# Patient Record
Sex: FEMALE | Race: WHITE | NOT HISPANIC OR LATINO
[De-identification: names, ages, dates, MRNs, and addresses within clinical notes are randomized per-mention and may not be internally consistent; named-entity substitution may affect disease eponyms.]

---

## 2017-01-17 ENCOUNTER — RX RENEWAL (OUTPATIENT)
Age: 67
End: 2017-01-17

## 2017-02-02 ENCOUNTER — APPOINTMENT (OUTPATIENT)
Dept: CARDIOLOGY | Facility: CLINIC | Age: 67
End: 2017-02-02

## 2017-02-02 VITALS — SYSTOLIC BLOOD PRESSURE: 120 MMHG | BODY MASS INDEX: 29.23 KG/M2 | DIASTOLIC BLOOD PRESSURE: 70 MMHG | WEIGHT: 165 LBS

## 2017-02-09 ENCOUNTER — APPOINTMENT (OUTPATIENT)
Dept: INTERNAL MEDICINE | Facility: CLINIC | Age: 67
End: 2017-02-09

## 2017-02-17 ENCOUNTER — APPOINTMENT (OUTPATIENT)
Dept: INTERNAL MEDICINE | Facility: CLINIC | Age: 67
End: 2017-02-17

## 2017-02-17 VITALS — WEIGHT: 164 LBS | BODY MASS INDEX: 29.06 KG/M2 | HEIGHT: 63 IN

## 2017-02-17 VITALS — SYSTOLIC BLOOD PRESSURE: 115 MMHG | HEART RATE: 59 BPM | DIASTOLIC BLOOD PRESSURE: 74 MMHG

## 2017-03-20 ENCOUNTER — APPOINTMENT (OUTPATIENT)
Dept: CARDIOLOGY | Facility: CLINIC | Age: 67
End: 2017-03-20

## 2017-03-20 VITALS
SYSTOLIC BLOOD PRESSURE: 119 MMHG | HEART RATE: 64 BPM | DIASTOLIC BLOOD PRESSURE: 82 MMHG | WEIGHT: 161 LBS | HEIGHT: 63 IN | BODY MASS INDEX: 28.53 KG/M2

## 2017-03-27 ENCOUNTER — RECORD ABSTRACTING (OUTPATIENT)
Age: 67
End: 2017-03-27

## 2017-03-27 DIAGNOSIS — I25.2 OLD MYOCARDIAL INFARCTION: ICD-10-CM

## 2017-03-27 DIAGNOSIS — Z12.4 ENCOUNTER FOR SCREENING FOR MALIGNANT NEOPLASM OF CERVIX: ICD-10-CM

## 2017-05-04 ENCOUNTER — APPOINTMENT (OUTPATIENT)
Dept: CARDIOLOGY | Facility: CLINIC | Age: 67
End: 2017-05-04

## 2017-06-19 ENCOUNTER — OUTPATIENT (OUTPATIENT)
Dept: OUTPATIENT SERVICES | Facility: HOSPITAL | Age: 67
LOS: 1 days | Discharge: HOME | End: 2017-06-19

## 2017-06-19 ENCOUNTER — APPOINTMENT (OUTPATIENT)
Dept: CARDIOLOGY | Facility: CLINIC | Age: 67
End: 2017-06-19

## 2017-06-19 VITALS
DIASTOLIC BLOOD PRESSURE: 82 MMHG | HEART RATE: 56 BPM | BODY MASS INDEX: 28.7 KG/M2 | SYSTOLIC BLOOD PRESSURE: 152 MMHG | WEIGHT: 162 LBS

## 2017-06-19 DIAGNOSIS — R07.9 CHEST PAIN, UNSPECIFIED: ICD-10-CM

## 2017-06-19 DIAGNOSIS — E78.5 HYPERLIPIDEMIA, UNSPECIFIED: ICD-10-CM

## 2017-06-19 DIAGNOSIS — I25.10 ATHEROSCLEROTIC HEART DISEASE OF NATIVE CORONARY ARTERY WITHOUT ANGINA PECTORIS: ICD-10-CM

## 2017-06-20 ENCOUNTER — APPOINTMENT (OUTPATIENT)
Dept: INTERNAL MEDICINE | Facility: CLINIC | Age: 67
End: 2017-06-20

## 2017-06-20 ENCOUNTER — OUTPATIENT (OUTPATIENT)
Dept: OUTPATIENT SERVICES | Facility: HOSPITAL | Age: 67
LOS: 1 days | Discharge: HOME | End: 2017-06-20

## 2017-06-20 VITALS
WEIGHT: 160 LBS | HEART RATE: 60 BPM | HEIGHT: 63 IN | DIASTOLIC BLOOD PRESSURE: 82 MMHG | BODY MASS INDEX: 28.35 KG/M2 | SYSTOLIC BLOOD PRESSURE: 129 MMHG

## 2017-06-20 DIAGNOSIS — R07.9 CHEST PAIN, UNSPECIFIED: ICD-10-CM

## 2017-06-20 DIAGNOSIS — I25.10 ATHEROSCLEROTIC HEART DISEASE OF NATIVE CORONARY ARTERY WITHOUT ANGINA PECTORIS: ICD-10-CM

## 2017-06-20 DIAGNOSIS — E78.5 HYPERLIPIDEMIA, UNSPECIFIED: ICD-10-CM

## 2017-06-28 DIAGNOSIS — I50.22 CHRONIC SYSTOLIC (CONGESTIVE) HEART FAILURE: ICD-10-CM

## 2017-06-28 DIAGNOSIS — R73.9 HYPERGLYCEMIA, UNSPECIFIED: ICD-10-CM

## 2017-06-28 DIAGNOSIS — I10 ESSENTIAL (PRIMARY) HYPERTENSION: ICD-10-CM

## 2017-06-28 DIAGNOSIS — I25.810 ATHEROSCLEROSIS OF CORONARY ARTERY BYPASS GRAFT(S) WITHOUT ANGINA PECTORIS: ICD-10-CM

## 2017-07-07 LAB
ALBUMIN SERPL-MCNC: 4.2 G/DL
ALBUMIN/GLOB SERPL: 1.83
ALP SERPL-CCNC: 72 IU/L
ALT SERPL-CCNC: 24 IU/L
ANION GAP SERPL CALC-SCNC: 8 MEQ/L
AST SERPL-CCNC: 20 IU/L
BASOPHILS # BLD: 0.02 TH/MM3
BASOPHILS NFR BLD: 0.4 %
BILIRUB SERPL-MCNC: 0.5 MG/DL
BUN SERPL-MCNC: 7 MG/DL
BUN/CREAT SERPL: 10.4 %
CALCIUM SERPL-MCNC: 9.3 MG/DL
CHLORIDE SERPL-SCNC: 106 MEQ/L
CHOLEST SERPL-MCNC: 140 MG/DL
CO2 SERPL-SCNC: 25 MEQ/L
CREAT SERPL-MCNC: 0.67 MG/DL
DIFFERENTIAL METHOD BLD: NORMAL
EOSINOPHIL # BLD: 0.07 TH/MM3
EOSINOPHIL NFR BLD: 1.3 %
ERYTHROCYTE [DISTWIDTH] IN BLOOD BY AUTOMATED COUNT: 13.6 %
ESTIMATED AVERGAGE GLUCOSE (NORTH): 120 MG/DL
GFR SERPL CREATININE-BSD FRML MDRD: 88
GLUCOSE SERPL-MCNC: 103 MG/DL
GRANULOCYTES # BLD: 2.43 TH/MM3
GRANULOCYTES NFR BLD: 45.4 %
HBA1C MFR BLD: 5.8 %
HCT VFR BLD AUTO: 38.4 %
HDLC SERPL-MCNC: 63 MG/DL
HDLC SERPL: 2.22
HGB BLD-MCNC: 12.7 G/DL
IMM GRANULOCYTES # BLD: 0 TH/MM3
IMM GRANULOCYTES NFR BLD: 0 %
LDLC SERPL DIRECT ASSAY-MCNC: 60 MG/DL
LYMPHOCYTES # BLD: 2.2 TH/MM3
LYMPHOCYTES NFR BLD: 41.1 %
MCH RBC QN AUTO: 32 PG
MCHC RBC AUTO-ENTMCNC: 33.1 G/DL
MCV RBC AUTO: 96.7 FL
MONOCYTES # BLD: 0.63 TH/MM3
MONOCYTES NFR BLD: 11.8 %
PLATELET # BLD: 241 TH/MM3
PMV BLD AUTO: 11.3 FL
POTASSIUM SERPL-SCNC: 4.4 MMOL/L
PROT SERPL-MCNC: 6.5 G/DL
RBC # BLD AUTO: 3.97 MIL/MM3
SODIUM SERPL-SCNC: 139 MEQ/L
TRIGL SERPL-MCNC: 104 MG/DL
VLDLC SERPL-MCNC: 20 MG/DL
WBC # BLD: 5.35 TH/MM3

## 2017-07-18 ENCOUNTER — EMERGENCY (EMERGENCY)
Facility: HOSPITAL | Age: 67
LOS: 0 days | Discharge: HOME | End: 2017-07-18
Admitting: INTERNAL MEDICINE

## 2017-07-18 DIAGNOSIS — Z95.1 PRESENCE OF AORTOCORONARY BYPASS GRAFT: ICD-10-CM

## 2017-07-18 DIAGNOSIS — R42 DIZZINESS AND GIDDINESS: ICD-10-CM

## 2017-07-18 DIAGNOSIS — I25.10 ATHEROSCLEROTIC HEART DISEASE OF NATIVE CORONARY ARTERY WITHOUT ANGINA PECTORIS: ICD-10-CM

## 2017-07-18 DIAGNOSIS — Z79.899 OTHER LONG TERM (CURRENT) DRUG THERAPY: ICD-10-CM

## 2017-07-18 DIAGNOSIS — R07.9 CHEST PAIN, UNSPECIFIED: ICD-10-CM

## 2017-07-18 DIAGNOSIS — Z95.810 PRESENCE OF AUTOMATIC (IMPLANTABLE) CARDIAC DEFIBRILLATOR: ICD-10-CM

## 2017-07-18 DIAGNOSIS — E78.00 PURE HYPERCHOLESTEROLEMIA, UNSPECIFIED: ICD-10-CM

## 2017-07-18 DIAGNOSIS — Z79.82 LONG TERM (CURRENT) USE OF ASPIRIN: ICD-10-CM

## 2017-07-18 DIAGNOSIS — I10 ESSENTIAL (PRIMARY) HYPERTENSION: ICD-10-CM

## 2017-07-18 DIAGNOSIS — K21.9 GASTRO-ESOPHAGEAL REFLUX DISEASE WITHOUT ESOPHAGITIS: ICD-10-CM

## 2017-07-18 DIAGNOSIS — E78.5 HYPERLIPIDEMIA, UNSPECIFIED: ICD-10-CM

## 2017-07-18 DIAGNOSIS — R11.11 VOMITING WITHOUT NAUSEA: ICD-10-CM

## 2017-07-24 ENCOUNTER — APPOINTMENT (OUTPATIENT)
Dept: INTERNAL MEDICINE | Facility: CLINIC | Age: 67
End: 2017-07-24

## 2017-07-24 ENCOUNTER — OUTPATIENT (OUTPATIENT)
Dept: OUTPATIENT SERVICES | Facility: HOSPITAL | Age: 67
LOS: 1 days | Discharge: HOME | End: 2017-07-24

## 2017-07-24 VITALS
HEIGHT: 63 IN | BODY MASS INDEX: 28.17 KG/M2 | DIASTOLIC BLOOD PRESSURE: 80 MMHG | HEART RATE: 64 BPM | WEIGHT: 159 LBS | SYSTOLIC BLOOD PRESSURE: 131 MMHG

## 2017-07-24 DIAGNOSIS — I25.10 ATHEROSCLEROTIC HEART DISEASE OF NATIVE CORONARY ARTERY WITHOUT ANGINA PECTORIS: ICD-10-CM

## 2017-07-24 DIAGNOSIS — R07.9 CHEST PAIN, UNSPECIFIED: ICD-10-CM

## 2017-07-24 DIAGNOSIS — E78.5 HYPERLIPIDEMIA, UNSPECIFIED: ICD-10-CM

## 2017-08-03 ENCOUNTER — APPOINTMENT (OUTPATIENT)
Dept: CARDIOLOGY | Facility: CLINIC | Age: 67
End: 2017-08-03

## 2017-08-03 VITALS — BODY MASS INDEX: 28.17 KG/M2 | WEIGHT: 159 LBS

## 2017-08-03 VITALS — SYSTOLIC BLOOD PRESSURE: 128 MMHG | DIASTOLIC BLOOD PRESSURE: 72 MMHG

## 2017-09-18 ENCOUNTER — APPOINTMENT (OUTPATIENT)
Dept: INTERNAL MEDICINE | Facility: CLINIC | Age: 67
End: 2017-09-18

## 2017-10-23 ENCOUNTER — APPOINTMENT (OUTPATIENT)
Dept: CARDIOLOGY | Facility: CLINIC | Age: 67
End: 2017-10-23

## 2017-10-23 ENCOUNTER — OUTPATIENT (OUTPATIENT)
Dept: OUTPATIENT SERVICES | Facility: HOSPITAL | Age: 67
LOS: 1 days | Discharge: HOME | End: 2017-10-23

## 2017-10-23 VITALS
WEIGHT: 163 LBS | SYSTOLIC BLOOD PRESSURE: 124 MMHG | HEART RATE: 65 BPM | BODY MASS INDEX: 28.88 KG/M2 | DIASTOLIC BLOOD PRESSURE: 70 MMHG | HEIGHT: 63 IN

## 2017-10-23 DIAGNOSIS — I25.10 ATHEROSCLEROTIC HEART DISEASE OF NATIVE CORONARY ARTERY WITHOUT ANGINA PECTORIS: ICD-10-CM

## 2017-10-23 DIAGNOSIS — R07.9 CHEST PAIN, UNSPECIFIED: ICD-10-CM

## 2017-10-23 DIAGNOSIS — E78.5 HYPERLIPIDEMIA, UNSPECIFIED: ICD-10-CM

## 2017-11-01 ENCOUNTER — OUTPATIENT (OUTPATIENT)
Dept: OUTPATIENT SERVICES | Facility: HOSPITAL | Age: 67
LOS: 1 days | Discharge: HOME | End: 2017-11-01

## 2017-11-01 ENCOUNTER — APPOINTMENT (OUTPATIENT)
Dept: INTERNAL MEDICINE | Facility: CLINIC | Age: 67
End: 2017-11-01

## 2017-11-01 VITALS
WEIGHT: 162 LBS | HEART RATE: 59 BPM | DIASTOLIC BLOOD PRESSURE: 79 MMHG | HEIGHT: 63 IN | BODY MASS INDEX: 28.7 KG/M2 | SYSTOLIC BLOOD PRESSURE: 122 MMHG

## 2017-11-01 DIAGNOSIS — Z23 ENCOUNTER FOR IMMUNIZATION: ICD-10-CM

## 2017-11-01 DIAGNOSIS — E78.5 HYPERLIPIDEMIA, UNSPECIFIED: ICD-10-CM

## 2017-11-01 DIAGNOSIS — I25.810 ATHEROSCLEROSIS OF CORONARY ARTERY BYPASS GRAFT(S) WITHOUT ANGINA PECTORIS: ICD-10-CM

## 2017-11-01 DIAGNOSIS — R07.9 CHEST PAIN, UNSPECIFIED: ICD-10-CM

## 2017-11-01 DIAGNOSIS — I10 ESSENTIAL (PRIMARY) HYPERTENSION: ICD-10-CM

## 2017-11-01 DIAGNOSIS — R73.03 PREDIABETES: ICD-10-CM

## 2017-11-01 DIAGNOSIS — I50.22 CHRONIC SYSTOLIC (CONGESTIVE) HEART FAILURE: ICD-10-CM

## 2017-11-01 DIAGNOSIS — I25.10 ATHEROSCLEROTIC HEART DISEASE OF NATIVE CORONARY ARTERY WITHOUT ANGINA PECTORIS: ICD-10-CM

## 2017-11-22 ENCOUNTER — OUTPATIENT (OUTPATIENT)
Dept: OUTPATIENT SERVICES | Facility: HOSPITAL | Age: 67
LOS: 1 days | Discharge: HOME | End: 2017-11-22

## 2017-11-22 DIAGNOSIS — R07.9 CHEST PAIN, UNSPECIFIED: ICD-10-CM

## 2017-11-22 DIAGNOSIS — E78.5 HYPERLIPIDEMIA, UNSPECIFIED: ICD-10-CM

## 2017-11-22 DIAGNOSIS — Z12.31 ENCOUNTER FOR SCREENING MAMMOGRAM FOR MALIGNANT NEOPLASM OF BREAST: ICD-10-CM

## 2017-11-22 DIAGNOSIS — I25.10 ATHEROSCLEROTIC HEART DISEASE OF NATIVE CORONARY ARTERY WITHOUT ANGINA PECTORIS: ICD-10-CM

## 2018-01-24 ENCOUNTER — RESULT REVIEW (OUTPATIENT)
Age: 68
End: 2018-01-24

## 2018-01-29 LAB
ANION GAP SERPL CALC-SCNC: 9 MEQ/L
BASOPHILS # BLD: 0.02 TH/MM3
BASOPHILS NFR BLD: 0.4 %
BUN SERPL-MCNC: 9 MG/DL
BUN/CREAT SERPL: 13.8 %
CALCIUM SERPL-MCNC: 9.9 MG/DL
CHLORIDE SERPL-SCNC: 104 MEQ/L
CHOLEST SERPL-MCNC: 163 MG/DL
CO2 SERPL-SCNC: 28 MEQ/L
CREAT SERPL-MCNC: 0.65 MG/DL
DIFFERENTIAL METHOD BLD: NORMAL
EOSINOPHIL # BLD: 0.07 TH/MM3
EOSINOPHIL NFR BLD: 1.4 %
ERYTHROCYTE [DISTWIDTH] IN BLOOD BY AUTOMATED COUNT: 13.4 %
ESTIMATED AVERGAGE GLUCOSE (NORTH): 117 MG/DL
GFR SERPL CREATININE-BSD FRML MDRD: 91
GLUCOSE SERPL-MCNC: 93 MG/DL
GRANULOCYTES # BLD: 2.49 TH/MM3
GRANULOCYTES NFR BLD: 50.5 %
HBA1C MFR BLD: 5.7 %
HCT VFR BLD AUTO: 38.8 %
HDLC SERPL-MCNC: 74 MG/DL
HDLC SERPL: 2.2
HGB BLD-MCNC: 12.9 G/DL
IMM GRANULOCYTES # BLD: 0 TH/MM3
IMM GRANULOCYTES NFR BLD: 0 %
LDLC SERPL DIRECT ASSAY-MCNC: 65 MG/DL
LYMPHOCYTES # BLD: 1.82 TH/MM3
LYMPHOCYTES NFR BLD: 36.8 %
MCH RBC QN AUTO: 31.5 PG
MCHC RBC AUTO-ENTMCNC: 33.2 G/DL
MCV RBC AUTO: 94.9 FL
MONOCYTES # BLD: 0.54 TH/MM3
MONOCYTES NFR BLD: 10.9 %
PLATELET # BLD: 265 TH/MM3
PMV BLD AUTO: 10.8 FL
POTASSIUM SERPL-SCNC: 5.4 MMOL/L
RBC # BLD AUTO: 4.09 MIL/MM3
SODIUM SERPL-SCNC: 141 MEQ/L
TRIGL SERPL-MCNC: 92 MG/DL
VLDLC SERPL-MCNC: 18 MG/DL
WBC # BLD: 4.94 TH/MM3

## 2018-02-01 ENCOUNTER — APPOINTMENT (OUTPATIENT)
Dept: CARDIOLOGY | Facility: CLINIC | Age: 68
End: 2018-02-01

## 2018-02-01 VITALS — SYSTOLIC BLOOD PRESSURE: 130 MMHG | DIASTOLIC BLOOD PRESSURE: 88 MMHG

## 2018-02-12 ENCOUNTER — OUTPATIENT (OUTPATIENT)
Dept: OUTPATIENT SERVICES | Facility: HOSPITAL | Age: 68
LOS: 1 days | Discharge: HOME | End: 2018-02-12

## 2018-02-12 ENCOUNTER — APPOINTMENT (OUTPATIENT)
Dept: CARDIOLOGY | Facility: CLINIC | Age: 68
End: 2018-02-12

## 2018-02-12 VITALS
WEIGHT: 168 LBS | SYSTOLIC BLOOD PRESSURE: 133 MMHG | BODY MASS INDEX: 29.77 KG/M2 | HEART RATE: 62 BPM | HEIGHT: 63 IN | DIASTOLIC BLOOD PRESSURE: 82 MMHG

## 2018-04-16 ENCOUNTER — LABORATORY RESULT (OUTPATIENT)
Age: 68
End: 2018-04-16

## 2018-04-16 ENCOUNTER — OUTPATIENT (OUTPATIENT)
Dept: OUTPATIENT SERVICES | Facility: HOSPITAL | Age: 68
LOS: 1 days | Discharge: HOME | End: 2018-04-16

## 2018-04-16 DIAGNOSIS — E87.5 HYPERKALEMIA: ICD-10-CM

## 2018-05-30 ENCOUNTER — OUTPATIENT (OUTPATIENT)
Dept: OUTPATIENT SERVICES | Facility: HOSPITAL | Age: 68
LOS: 1 days | Discharge: HOME | End: 2018-05-30

## 2018-05-30 ENCOUNTER — APPOINTMENT (OUTPATIENT)
Dept: INTERNAL MEDICINE | Facility: CLINIC | Age: 68
End: 2018-05-30

## 2018-05-30 VITALS
HEART RATE: 69 BPM | WEIGHT: 166 LBS | DIASTOLIC BLOOD PRESSURE: 91 MMHG | SYSTOLIC BLOOD PRESSURE: 141 MMHG | HEIGHT: 63 IN | BODY MASS INDEX: 29.41 KG/M2

## 2018-05-30 DIAGNOSIS — K64.9 UNSPECIFIED HEMORRHOIDS: ICD-10-CM

## 2018-05-30 DIAGNOSIS — R42 DIZZINESS AND GIDDINESS: ICD-10-CM

## 2018-05-30 DIAGNOSIS — I25.810 ATHEROSCLEROSIS OF CORONARY ARTERY BYPASS GRAFT(S) WITHOUT ANGINA PECTORIS: ICD-10-CM

## 2018-05-30 DIAGNOSIS — I50.22 CHRONIC SYSTOLIC (CONGESTIVE) HEART FAILURE: ICD-10-CM

## 2018-05-30 DIAGNOSIS — Z23 ENCOUNTER FOR IMMUNIZATION: ICD-10-CM

## 2018-05-30 DIAGNOSIS — I10 ESSENTIAL (PRIMARY) HYPERTENSION: ICD-10-CM

## 2018-05-30 NOTE — HISTORY OF PRESENT ILLNESS
[de-identified] : 67 yof, PMH of CAD s/p CABG, systolic CHF with AICD, HTN, HLD, BPPV, GERD, pre-DM presents for follow up and medication refills.  Patient complains of lower extremity edema (all day) and burning (in PM)  x 3-4 months.  Patient does take amlodipine and does not take diuretics.  Other than that, the patient feels well and has no complaints of chest pain, palpitations, or shortness of breath.  She follows regularly with cardiology and EP and is complaint with all her medications.

## 2018-05-30 NOTE — ASSESSMENT
[FreeTextEntry1] : 67 yof, PMH of CAD s/p CABG, systolic CHF with AICD, HTN, HLD, BPPV, GERD, pre-DM presents for follow up and medication refills.  Patient complains of lower extremity edema (all day) and burning (in PM)  x 3-4 months.  Patient does take amlodipine and does not take diuretics.\par 1. B/L LE edema-discontinue amlodipine.  Change to chlorthalidone 25 mg.  Patient should take blood pressure readings at home and return to the office in one month with readings to discuss next steps in plan of care.\par 2. CAD s/p CABG, systolic CHF with AICD-follow up with cardiology and electrophysiology.  Patient is on optimal medical therapy.\par 3. HTN-amlodipine discontinued.  Changed to chlorthalidone, secondary to lower extremity edema.  Continue ACE inhibitor, beta blocker.\par 4. HLD-LDL of 65 on atorvastatin 80 mg.\par 5. GERD-continue famotidine\par 6. Pre-DM-last A1c 5.7.   Continue diet and exercise.\par 7. BPPV-continue meclizine\par 8. HCM-patient received prevnar vaccine in clinic today.  Colonoscopy UTD.  Patient due for mammogram in November 2018.  Patient undecided regarding shingles vaccine.

## 2018-05-30 NOTE — PHYSICAL EXAM
[No Acute Distress] : no acute distress [Well Nourished] : well nourished [Well Developed] : well developed [Well-Appearing] : well-appearing [Normal Sclera/Conjunctiva] : normal sclera/conjunctiva [Normal Outer Ear/Nose] : the outer ears and nose were normal in appearance [No JVD] : no jugular venous distention [Supple] : supple [No Respiratory Distress] : no respiratory distress  [Clear to Auscultation] : lungs were clear to auscultation bilaterally [No Accessory Muscle Use] : no accessory muscle use [Normal Rate] : normal rate  [Regular Rhythm] : with a regular rhythm [Normal S1, S2] : normal S1 and S2 [No Murmur] : no murmur heard [No Rash] : no rash [No Skin Lesions] : no skin lesions [Coordination Grossly Intact] : coordination grossly intact [No Focal Deficits] : no focal deficits [Speech Grossly Normal] : speech grossly normal [Memory Grossly Normal] : memory grossly normal [Normal Affect] : the affect was normal [Alert and Oriented x3] : oriented to person, place, and time [Normal Mood] : the mood was normal [Normal Insight/Judgement] : insight and judgment were intact [de-identified] : 1+ B/L LE pitting edema

## 2018-05-30 NOTE — REVIEW OF SYSTEMS
[Lower Ext Edema] : lower extremity edema [Negative] : Neurological [Chest Pain] : no chest pain [Palpitations] : no palpitations [Leg Claudication] : no leg claudication [Orthopnea] : no orthopnea [Paroysmal Nocturnal Dyspnea] : no paroysmal nocturnal dyspnea

## 2018-06-04 ENCOUNTER — OUTPATIENT (OUTPATIENT)
Dept: OUTPATIENT SERVICES | Facility: HOSPITAL | Age: 68
LOS: 1 days | Discharge: HOME | End: 2018-06-04

## 2018-06-04 ENCOUNTER — APPOINTMENT (OUTPATIENT)
Dept: CARDIOLOGY | Facility: CLINIC | Age: 68
End: 2018-06-04

## 2018-06-04 VITALS
WEIGHT: 166 LBS | SYSTOLIC BLOOD PRESSURE: 133 MMHG | DIASTOLIC BLOOD PRESSURE: 86 MMHG | HEART RATE: 73 BPM | BODY MASS INDEX: 29.41 KG/M2 | HEIGHT: 63 IN

## 2018-06-04 RX ORDER — MECLIZINE HYDROCHLORIDE 25 MG/1
25 TABLET ORAL
Qty: 270 | Refills: 1 | Status: DISCONTINUED | COMMUNITY
Start: 2017-07-24 | End: 2018-06-04

## 2018-06-05 DIAGNOSIS — I50.22 CHRONIC SYSTOLIC (CONGESTIVE) HEART FAILURE: ICD-10-CM

## 2018-06-05 DIAGNOSIS — I25.10 ATHEROSCLEROTIC HEART DISEASE OF NATIVE CORONARY ARTERY WITHOUT ANGINA PECTORIS: ICD-10-CM

## 2018-06-05 DIAGNOSIS — I25.5 ISCHEMIC CARDIOMYOPATHY: ICD-10-CM

## 2018-06-05 DIAGNOSIS — I10 ESSENTIAL (PRIMARY) HYPERTENSION: ICD-10-CM

## 2018-06-28 ENCOUNTER — APPOINTMENT (OUTPATIENT)
Dept: INTERNAL MEDICINE | Facility: CLINIC | Age: 68
End: 2018-06-28

## 2018-06-28 ENCOUNTER — OUTPATIENT (OUTPATIENT)
Dept: OUTPATIENT SERVICES | Facility: HOSPITAL | Age: 68
LOS: 1 days | Discharge: HOME | End: 2018-06-28

## 2018-06-28 VITALS
HEIGHT: 62 IN | HEART RATE: 66 BPM | BODY MASS INDEX: 31.47 KG/M2 | DIASTOLIC BLOOD PRESSURE: 81 MMHG | WEIGHT: 171 LBS | SYSTOLIC BLOOD PRESSURE: 125 MMHG

## 2018-06-28 DIAGNOSIS — R60.0 LOCALIZED EDEMA: ICD-10-CM

## 2018-06-28 DIAGNOSIS — I25.810 ATHEROSCLEROSIS OF CORONARY ARTERY BYPASS GRAFT(S) WITHOUT ANGINA PECTORIS: ICD-10-CM

## 2018-06-28 DIAGNOSIS — B00.1 HERPESVIRAL VESICULAR DERMATITIS: ICD-10-CM

## 2018-06-28 DIAGNOSIS — B00.9 HERPESVIRAL INFECTION, UNSPECIFIED: ICD-10-CM

## 2018-06-28 DIAGNOSIS — I10 ESSENTIAL (PRIMARY) HYPERTENSION: ICD-10-CM

## 2018-06-28 DIAGNOSIS — I50.22 CHRONIC SYSTOLIC (CONGESTIVE) HEART FAILURE: ICD-10-CM

## 2018-06-28 NOTE — PHYSICAL EXAM
[No Acute Distress] : no acute distress [Well Developed] : well developed [Normal Sclera/Conjunctiva] : normal sclera/conjunctiva [Normal Outer Ear/Nose] : the outer ears and nose were normal in appearance [Supple] : supple [No Respiratory Distress] : no respiratory distress  [Clear to Auscultation] : lungs were clear to auscultation bilaterally [Regular Rhythm] : with a regular rhythm [Soft] : abdomen soft [Non Tender] : non-tender [No CVA Tenderness] : no CVA  tenderness [No Joint Swelling] : no joint swelling [No Rash] : no rash [No Focal Deficits] : no focal deficits [Normal Affect] : the affect was normal [de-identified] : herpatic lesion over lateral lip [de-identified] : 1+ pitting edema to RLE

## 2018-06-28 NOTE — ASSESSMENT
[FreeTextEntry1] : 66 yo F with CAD s/p CABG, systolic CHF with AICD, HTN, HLD, BPPV, GERD and pre-DM presents for follow up. \par \par  - herpetic lip lesion\par start acyclovir cream\par \par  - CAD s/p CABG with HFrEF s/p AICD\par c/w ASA, plavix, metoprolol, IMDUR and lipitor\par fu with cardiology and EP as scheduled\par \par  - HTN, well controlled,ankle swelling was resolved after was stopped amlodipine.\par c/w chlorthalidion \par \par  - DLD and pre diabetes\par c/w lipitor \par pt counselled about weight loss and dietary adjustments \par \par  - HCM\par fu in 6 months

## 2018-06-28 NOTE — HISTORY OF PRESENT ILLNESS
[FreeTextEntry1] : regular fu [de-identified] : 68 yo F with CAD s/p CABG, systolic CHF with AICD, HTN, HLD, BPPV, GERD and pre-DM presents for follow up. pt reports improvement in her LE edema since last visit. she thinks chlorthalidone is working well. pt is checking her BP at home prior to taking meds and readings range between 150/90 and 110/70s. pt has herpatic lesion on her lip. she reports some tiredness. pt denies any chest pain, palpitations, or shortness of breath. She follows regularly with cardiology and EP and is complaint with all her medications. \par

## 2018-08-02 ENCOUNTER — APPOINTMENT (OUTPATIENT)
Dept: CARDIOLOGY | Facility: CLINIC | Age: 68
End: 2018-08-02

## 2018-08-02 VITALS — HEART RATE: 57 BPM | DIASTOLIC BLOOD PRESSURE: 68 MMHG | SYSTOLIC BLOOD PRESSURE: 102 MMHG | HEIGHT: 62 IN

## 2018-09-03 PROBLEM — R60.0 BILATERAL EDEMA OF LOWER EXTREMITY: Status: RESOLVED | Noted: 2018-05-30 | Resolved: 2018-09-03

## 2018-09-13 ENCOUNTER — RX RENEWAL (OUTPATIENT)
Age: 68
End: 2018-09-13

## 2018-10-15 ENCOUNTER — APPOINTMENT (OUTPATIENT)
Dept: CARDIOLOGY | Facility: CLINIC | Age: 68
End: 2018-10-15

## 2018-10-15 ENCOUNTER — OUTPATIENT (OUTPATIENT)
Dept: OUTPATIENT SERVICES | Facility: HOSPITAL | Age: 68
LOS: 1 days | Discharge: HOME | End: 2018-10-15

## 2018-10-15 ENCOUNTER — RX RENEWAL (OUTPATIENT)
Age: 68
End: 2018-10-15

## 2018-10-15 VITALS
BODY MASS INDEX: 31.65 KG/M2 | WEIGHT: 172 LBS | HEIGHT: 62 IN | SYSTOLIC BLOOD PRESSURE: 119 MMHG | DIASTOLIC BLOOD PRESSURE: 72 MMHG | HEART RATE: 64 BPM

## 2018-10-15 DIAGNOSIS — I25.10 ATHEROSCLEROTIC HEART DISEASE OF NATIVE CORONARY ARTERY WITHOUT ANGINA PECTORIS: ICD-10-CM

## 2018-10-15 DIAGNOSIS — I10 ESSENTIAL (PRIMARY) HYPERTENSION: ICD-10-CM

## 2018-10-15 DIAGNOSIS — I25.5 ISCHEMIC CARDIOMYOPATHY: ICD-10-CM

## 2018-10-21 LAB
ANION GAP SERPL CALC-SCNC: 14 MMOL/L
BUN SERPL-MCNC: 9 MG/DL
CALCIUM SERPL-MCNC: 9.2 MG/DL
CHLORIDE SERPL-SCNC: 104 MMOL/L
CO2 SERPL-SCNC: 26 MMOL/L
CREAT SERPL-MCNC: 0.6 MG/DL
GLUCOSE SERPL-MCNC: 108 MG/DL
POTASSIUM SERPL-SCNC: 4.2 MMOL/L
SODIUM SERPL-SCNC: 144 MMOL/L

## 2018-10-24 ENCOUNTER — APPOINTMENT (OUTPATIENT)
Dept: INTERNAL MEDICINE | Facility: CLINIC | Age: 68
End: 2018-10-24

## 2018-10-24 ENCOUNTER — OUTPATIENT (OUTPATIENT)
Dept: OUTPATIENT SERVICES | Facility: HOSPITAL | Age: 68
LOS: 1 days | Discharge: HOME | End: 2018-10-24

## 2018-10-24 VITALS
RESPIRATION RATE: 16 BRPM | DIASTOLIC BLOOD PRESSURE: 67 MMHG | WEIGHT: 172.38 LBS | TEMPERATURE: 96.8 F | HEIGHT: 62 IN | SYSTOLIC BLOOD PRESSURE: 137 MMHG | HEART RATE: 61 BPM | BODY MASS INDEX: 31.72 KG/M2

## 2018-10-24 DIAGNOSIS — I25.729 ATHEROSCLEROSIS OF AUTOLOGOUS ARTERY CORONARY ARTERY BYPASS GRAFT(S) WITH UNSPECIFIED ANGINA PECTORIS: ICD-10-CM

## 2018-10-24 DIAGNOSIS — I25.5 ISCHEMIC CARDIOMYOPATHY: ICD-10-CM

## 2018-10-24 DIAGNOSIS — I10 ESSENTIAL (PRIMARY) HYPERTENSION: ICD-10-CM

## 2018-10-24 DIAGNOSIS — R07.9 CHEST PAIN, UNSPECIFIED: ICD-10-CM

## 2018-10-24 DIAGNOSIS — Z86.39 PERSONAL HISTORY OF OTHER ENDOCRINE, NUTRITIONAL AND METABOLIC DISEASE: ICD-10-CM

## 2018-10-24 DIAGNOSIS — Z23 ENCOUNTER FOR IMMUNIZATION: ICD-10-CM

## 2018-10-24 RX ORDER — NITROGLYCERIN 400 UG/1
0.4 SPRAY ORAL
Qty: 2 | Refills: 3 | Status: DISCONTINUED | COMMUNITY
Start: 2018-02-01 | End: 2018-10-24

## 2018-10-24 RX ORDER — HYDROCORTISONE ACETATE PRAMOXINE HCL 1; 1 G/100G; G/100G
1-1 CREAM TOPICAL
Qty: 1 | Refills: 5 | Status: DISCONTINUED | COMMUNITY
Start: 2018-05-30 | End: 2018-10-24

## 2018-10-24 RX ORDER — ACYCLOVIR 50 MG/G
5 OINTMENT TOPICAL 3 TIMES DAILY
Qty: 1 | Refills: 0 | Status: DISCONTINUED | COMMUNITY
Start: 2018-06-28 | End: 2018-10-24

## 2018-10-24 NOTE — PHYSICAL EXAM
[No Acute Distress] : no acute distress [Normal Sclera/Conjunctiva] : normal sclera/conjunctiva [Normal Outer Ear/Nose] : the outer ears and nose were normal in appearance [No JVD] : no jugular venous distention [Supple] : supple [No Respiratory Distress] : no respiratory distress  [Clear to Auscultation] : lungs were clear to auscultation bilaterally [No Accessory Muscle Use] : no accessory muscle use [Normal Rate] : normal rate  [Regular Rhythm] : with a regular rhythm [Normal S1, S2] : normal S1 and S2 [No Carotid Bruits] : no carotid bruits [No Edema] : there was no peripheral edema [Soft] : abdomen soft [Non Tender] : non-tender [Non-distended] : non-distended [No HSM] : no HSM [Normal Bowel Sounds] : normal bowel sounds [No CVA Tenderness] : no CVA  tenderness [Grossly Normal Strength/Tone] : grossly normal strength/tone [Normal Gait] : normal gait [No Focal Deficits] : no focal deficits

## 2018-10-24 NOTE — PLAN
[FreeTextEntry1] : \par \par 67 yo F with CAD s/p CABG, systolic CHF with AICD, HTN, HLD, BPPV, GERD and pre-DM presents for follow up. pt complains of episodes of chest pain not radiating, not related to physical exertion , last episode was 2 days ago,sometimes occur at night. pt denies fever , cough , palpitations , LL edema or any other symptoms.\par \par # CAD s/p CABG with HFrEF s/p AICD: \par - EKG done today: no new acute ischemic changes , consistent with same EKG from from August 2018. \par - c/w ASA, Plavix, metoprolol, IMDUR and Lipitor\par - f/u with Cardiology\par \par # HTN:\par - well controlled,\par - c/w chlorthalidone \par \par # DLD and pre diabetes: \par - c/w Lipitor \par - pt counselled about weight loss and dietary adjustments \par \par # HCM\par - flu shot today\par - mammogram referral\par - f/u in 3 months

## 2018-10-24 NOTE — REVIEW OF SYSTEMS
[Chest Pain] : chest pain [Fever] : no fever [Chills] : no chills [Fatigue] : no fatigue [Night Sweats] : no night sweats [Vision Problems] : no vision problems [Earache] : no earache [Hearing Loss] : no hearing loss [Nasal Discharge] : no nasal discharge [Palpitations] : no palpitations [Leg Claudication] : no leg claudication [Lower Ext Edema] : no lower extremity edema [Orthopnea] : no orthopnea [Paroysmal Nocturnal Dyspnea] : no paroysmal nocturnal dyspnea [Shortness Of Breath] : no shortness of breath [Wheezing] : no wheezing [Cough] : no cough [Dyspnea on Exertion] : no dyspnea on exertion [Abdominal Pain] : no abdominal pain [Nausea] : no nausea [Constipation] : no constipation [Diarrhea] : diarrhea [Vomiting] : no vomiting [Heartburn] : no heartburn [Dysuria] : no dysuria [Hematuria] : no hematuria [Joint Pain] : no joint pain [Muscle Pain] : no muscle pain [Itching] : no itching [Skin Rash] : no skin rash [Headache] : no headache [Anxiety] : no anxiety [Depression] : no depression

## 2018-10-24 NOTE — HISTORY OF PRESENT ILLNESS
[Family Member] : family member [FreeTextEntry1] : 67 yo F is here for routine visit and to fill up forms. [de-identified] : 67 yo F with CAD s/p CABG, systolic CHF with AICD, HTN, HLD, BPPV, GERD and pre-DM presents for follow up. pt complains of episodes of chest pain not radiating, not related to physical exertion , last episode was 2 days ago, usually occur at night. pt denies fever , cough , palpitations , LL edema or any other symptoms\par

## 2018-11-27 ENCOUNTER — OUTPATIENT (OUTPATIENT)
Dept: OUTPATIENT SERVICES | Facility: HOSPITAL | Age: 68
LOS: 1 days | Discharge: HOME | End: 2018-11-27

## 2018-11-27 DIAGNOSIS — Z12.31 ENCOUNTER FOR SCREENING MAMMOGRAM FOR MALIGNANT NEOPLASM OF BREAST: ICD-10-CM

## 2018-12-21 ENCOUNTER — APPOINTMENT (OUTPATIENT)
Dept: INTERNAL MEDICINE | Facility: CLINIC | Age: 68
End: 2018-12-21

## 2018-12-21 ENCOUNTER — OUTPATIENT (OUTPATIENT)
Dept: OUTPATIENT SERVICES | Facility: HOSPITAL | Age: 68
LOS: 1 days | Discharge: HOME | End: 2018-12-21

## 2018-12-21 NOTE — PHYSICAL EXAM
[No Acute Distress] : no acute distress [Well Nourished] : well nourished [Well Developed] : well developed [Well-Appearing] : well-appearing [Normal Sclera/Conjunctiva] : normal sclera/conjunctiva [EOMI] : extraocular movements intact [Normal Outer Ear/Nose] : the outer ears and nose were normal in appearance [Normal Oropharynx] : the oropharynx was normal [No JVD] : no jugular venous distention [Supple] : supple [No Respiratory Distress] : no respiratory distress  [Clear to Auscultation] : lungs were clear to auscultation bilaterally [No Accessory Muscle Use] : no accessory muscle use [Normal Rate] : normal rate  [Regular Rhythm] : with a regular rhythm [Normal S1, S2] : normal S1 and S2 [Pedal Pulses Present] : the pedal pulses are present [No Edema] : there was no peripheral edema [Soft] : abdomen soft [Non Tender] : non-tender [Non-distended] : non-distended [No Masses] : no abdominal mass palpated [No Spinal Tenderness] : no spinal tenderness [No Joint Swelling] : no joint swelling [Grossly Normal Strength/Tone] : grossly normal strength/tone [No Rash] : no rash [Normal Gait] : normal gait [Deep Tendon Reflexes (DTR)] : deep tendon reflexes were 2+ and symmetric [Normal Affect] : the affect was normal

## 2018-12-21 NOTE — HISTORY OF PRESENT ILLNESS
[Spouse] : spouse [FreeTextEntry1] : follow up visit for cad and DM [de-identified] : 69 yo F with CAD s/p CABG, systolic CHF with AICD, HTN, HLD, BPPV, GERD and pre-DM presents for follow up. patient needs refills in meds, flu vaccien and colonoscopy is UTD. her AICD device was remotely interrogated in nov and was normal as per the notes. she is asymptomatic and denies any CP. follows with dr pedro and dr vasquez for chf/cad/aicd. \par

## 2018-12-21 NOTE — ASSESSMENT
[FreeTextEntry1] : 69 yo F with CAD s/p CABG 7 years back in [facility], systolic CHF with AICD, HTN, HLD, BPPV, GERD and pre-DM presents for follow up. patient needs refills in meds, flu vaccien and colonoscopy is UTD. her AICD device was remotely interrogated in nov and was normal as per the notes. she is asymptomatic and denies any CP. follows with dr pedro and dr vasquez for chf/cad/aicd. \par \par 1- CAD s/p CABG 7 years ago \par HFREF s/p AICD\par \par stable\par c/w meds- ASA, lipitor, toprol xl, ACE-, plavix, imdur, ranexa\par \par 2- HTN- controlled on meds\par c/w meds\par \par 3- DLD- c/w statin\par \par 4- GERD- c/w famotidine q24\par \par 5- HCM- UTD\par mammogram normal\par flu vaccine received in last visit\par colonoscopy within last 10 yrs\par no more pap smear

## 2018-12-24 DIAGNOSIS — I25.810 ATHEROSCLEROSIS OF CORONARY ARTERY BYPASS GRAFT(S) WITHOUT ANGINA PECTORIS: ICD-10-CM

## 2018-12-24 DIAGNOSIS — I10 ESSENTIAL (PRIMARY) HYPERTENSION: ICD-10-CM

## 2018-12-24 DIAGNOSIS — I25.5 ISCHEMIC CARDIOMYOPATHY: ICD-10-CM

## 2018-12-24 DIAGNOSIS — I50.22 CHRONIC SYSTOLIC (CONGESTIVE) HEART FAILURE: ICD-10-CM

## 2019-01-31 ENCOUNTER — MEDICATION RENEWAL (OUTPATIENT)
Age: 69
End: 2019-01-31

## 2019-03-18 ENCOUNTER — RX RENEWAL (OUTPATIENT)
Age: 69
End: 2019-03-18

## 2019-04-15 ENCOUNTER — OUTPATIENT (OUTPATIENT)
Dept: OUTPATIENT SERVICES | Facility: HOSPITAL | Age: 69
LOS: 1 days | Discharge: HOME | End: 2019-04-15

## 2019-04-15 ENCOUNTER — APPOINTMENT (OUTPATIENT)
Dept: CARDIOLOGY | Facility: CLINIC | Age: 69
End: 2019-04-15
Payer: MEDICARE

## 2019-04-15 VITALS
HEART RATE: 73 BPM | SYSTOLIC BLOOD PRESSURE: 115 MMHG | DIASTOLIC BLOOD PRESSURE: 70 MMHG | BODY MASS INDEX: 32.39 KG/M2 | HEIGHT: 62 IN | WEIGHT: 176 LBS

## 2019-04-15 DIAGNOSIS — I25.10 ATHEROSCLEROTIC HEART DISEASE OF NATIVE CORONARY ARTERY WITHOUT ANGINA PECTORIS: ICD-10-CM

## 2019-04-15 DIAGNOSIS — I25.5 ISCHEMIC CARDIOMYOPATHY: ICD-10-CM

## 2019-04-15 PROCEDURE — 99213 OFFICE O/P EST LOW 20 MIN: CPT

## 2019-04-15 NOTE — PHYSICAL EXAM
[Normal Appearance] : normal appearance [General Appearance - Well Developed] : well developed [General Appearance - Well Nourished] : well nourished [Normal Jugular Venous V Waves Present] : normal jugular venous V waves present [] : no respiratory distress [Respiration, Rhythm And Depth] : normal respiratory rhythm and effort [Exaggerated Use Of Accessory Muscles For Inspiration] : no accessory muscle use [Heart Sounds] : normal S1 and S2 [Heart Rate And Rhythm] : heart rate and rhythm were normal [Murmurs] : no murmurs present [Nail Clubbing] : no clubbing of the fingernails [Cyanosis, Localized] : no localized cyanosis [Oriented To Time, Place, And Person] : oriented to person, place, and time

## 2019-04-22 ENCOUNTER — RX RENEWAL (OUTPATIENT)
Age: 69
End: 2019-04-22

## 2019-04-25 ENCOUNTER — FORM ENCOUNTER (OUTPATIENT)
Age: 69
End: 2019-04-25

## 2019-04-26 ENCOUNTER — OUTPATIENT (OUTPATIENT)
Dept: OUTPATIENT SERVICES | Facility: HOSPITAL | Age: 69
LOS: 1 days | Discharge: HOME | End: 2019-04-26

## 2019-04-26 DIAGNOSIS — I25.10 ATHEROSCLEROTIC HEART DISEASE OF NATIVE CORONARY ARTERY WITHOUT ANGINA PECTORIS: ICD-10-CM

## 2019-05-04 NOTE — ASSESSMENT
[FreeTextEntry1] : 69 yo F with PMH of HTN, DLD, ischemic cardiomyopathy, HFrEF s/p AICD, CAD s/p CABG + PCI, hyperkalemia here at clinic for routine follow up.\par \par # HFrEF\par -ECHO ( 2016) 40-45% and MUGA- 50%\par -rpt ECHO\par -last EKG 10/18- Sinus with 1 degree AV block\par -Recheck ECHO \par \par # CAD s/p PCI\par - On ASA, plavix, BB,Statin and ACE\par - No GIB\par \par \par # Chronic Stable Angina\par - On ranexa and Nitro \par \par # Hyperkalemia\par -Will recheck labs, no new labs since 2019\par -Check CMP for LFTs and K with PMD\par \par - f/u 4 months \par

## 2019-05-04 NOTE — END OF VISIT
[FreeTextEntry3] : Seen / examined and above reviewed.\par \par CAD s/p CABG, ICM s/p AICD, Chronic Systolic CHF.\par ECHO (2016): EF 40-45%. PN filling. Mild - Mod MR.\par MUGA (2016): EF 50%.\par \par Feels well.\par Relatively active. No interval symptoms.  Gained 4-lbs.\par \par BP controlled.\par Clinically compensated / euvolemic.\par \par - Cont ASA / Plavix.\par - Cont Lipitor.\par - Cont Toprol, Lisinopril, Chlorthalidone.\par - Cont Imdur, Ranexa.\par - ECHO\par - Regular PMD follow-up / labs.\par - Follow-up 4-months.  [] : Resident

## 2019-05-04 NOTE — HISTORY OF PRESENT ILLNESS
[FreeTextEntry1] : 69 yo F with PMH of HTN, DLD, ischemic cardiomyopathy, HFrEF s/p AICD, CAD s/p CABG + PCI, hyperkalemia here at clinic for routine follow up.\par \par As per pt, she has SOB and CP upon ambulation, although she is at her baseline per pt.  Her sxs worsen with climbing stairs. She denied LE swelling, orthopnea, PND. She did obtain her medications. \par \par ECHO (2016)\par EF 40-45%\par Inferolateral and basal mid inferior wall appears akinetic\par \par MUGA scan (2016)\par EF- 50%

## 2019-06-14 ENCOUNTER — OUTPATIENT (OUTPATIENT)
Dept: OUTPATIENT SERVICES | Facility: HOSPITAL | Age: 69
LOS: 1 days | Discharge: HOME | End: 2019-06-14

## 2019-06-14 ENCOUNTER — APPOINTMENT (OUTPATIENT)
Dept: INTERNAL MEDICINE | Facility: CLINIC | Age: 69
End: 2019-06-14

## 2019-06-14 VITALS
TEMPERATURE: 96.5 F | SYSTOLIC BLOOD PRESSURE: 138 MMHG | BODY MASS INDEX: 32.39 KG/M2 | WEIGHT: 176 LBS | HEIGHT: 62 IN | DIASTOLIC BLOOD PRESSURE: 73 MMHG | HEART RATE: 57 BPM

## 2019-06-14 DIAGNOSIS — I10 ESSENTIAL (PRIMARY) HYPERTENSION: ICD-10-CM

## 2019-06-14 DIAGNOSIS — F32.9 MAJOR DEPRESSIVE DISORDER, SINGLE EPISODE, UNSPECIFIED: ICD-10-CM

## 2019-06-18 NOTE — HISTORY OF PRESENT ILLNESS
[FreeTextEntry1] : refills [de-identified] : 68 F seen for refills. Denies SOB, CP or palipitations

## 2019-06-18 NOTE — ASSESSMENT
[FreeTextEntry1] : 67 yo F with CAD s/p CABG 7 years back in [facility], systolic CHF with AICD, HTN, HLD, BPPV, GERD and pre-DM presents for follow up. patient needs refills in meds, flu vaccien and colonoscopy is UTD. her AICD device was remotely interrogated in nov and was normal as per the notes. she is asymptomatic and denies any CP. follows with dr pedro and dr vasquez for chf/cad/aicd. \par \par 1- CAD s/p CABG 7 years ago \par HFREF s/p AICD\par \par stable\par c/w meds- ASA, lipitor, toprol xl, ACE-, plavix, imdur, ranexa\par \par 2- HTN- controlled on meds\par c/w meds\par \par 3- DLD- c/w statin\par \par 4- GERD- c/w famotidine q24\par \par 5- HCM- UTD\par mammogram normal\par flu vaccine received in last visit\par colonoscopy within last 10 yrs\par no more pap smear.

## 2019-06-18 NOTE — PHYSICAL EXAM
[No Acute Distress] : no acute distress [Normal Sclera/Conjunctiva] : normal sclera/conjunctiva [No JVD] : no jugular venous distention [Normal Outer Ear/Nose] : the outer ears and nose were normal in appearance [No Respiratory Distress] : no respiratory distress  [Normal Rate] : normal rate  [Regular Rhythm] : with a regular rhythm [Soft] : abdomen soft [No Carotid Bruits] : no carotid bruits [No HSM] : no HSM [No CVA Tenderness] : no CVA  tenderness [No Joint Swelling] : no joint swelling [Normal Gait] : normal gait [No Rash] : no rash [Speech Grossly Normal] : speech grossly normal

## 2019-06-27 ENCOUNTER — APPOINTMENT (OUTPATIENT)
Dept: CARDIOLOGY | Facility: CLINIC | Age: 69
End: 2019-06-27
Payer: MEDICAID

## 2019-06-27 VITALS — SYSTOLIC BLOOD PRESSURE: 120 MMHG | DIASTOLIC BLOOD PRESSURE: 70 MMHG

## 2019-06-27 PROCEDURE — 99213 OFFICE O/P EST LOW 20 MIN: CPT

## 2019-06-27 PROCEDURE — 93282 PRGRMG EVAL IMPLANTABLE DFB: CPT

## 2019-06-27 NOTE — PHYSICAL EXAM
[General Appearance - Well Developed] : well developed [Normal Appearance] : normal appearance [Well Groomed] : well groomed [General Appearance - Well Nourished] : well nourished [No Deformities] : no deformities [General Appearance - In No Acute Distress] : no acute distress [Heart Rate And Rhythm] : heart rate and rhythm were normal [Murmurs] : no murmurs present [Heart Sounds] : normal S1 and S2 [Respiration, Rhythm And Depth] : normal respiratory rhythm and effort [Exaggerated Use Of Accessory Muscles For Inspiration] : no accessory muscle use [Auscultation Breath Sounds / Voice Sounds] : lungs were clear to auscultation bilaterally [Clean] : clean [Dry] : dry [Well-Healed] : well-healed [Abdomen Soft] : soft [Abdomen Tenderness] : non-tender [Abdomen Mass (___ Cm)] : no abdominal mass palpated [Nail Clubbing] : no clubbing of the fingernails [Petechial Hemorrhages (___cm)] : no petechial hemorrhages [Cyanosis, Localized] : no localized cyanosis [] : no ischemic changes

## 2019-06-27 NOTE — PROCEDURE
[No] : not [NSR] : normal sinus rhythm [ICD] : Implantable cardioverter-defibrillator [VVI] : VVI [Impedance: ___ Ohms] : current cell impedance is [unfilled] Ohms [Charge Time: ___ sec] : charge time was [unfilled] seconds [Longevity: ___ months] : The estimated remaining battery life is [unfilled] months [Threshold Testing Performed] : Threshold testing was performed [Normal] : The battery status is normal. [Lead Imp:  ___ohms] : lead impedance was [unfilled] ohms [Sensing Amplitude ___mv] : sensing amplitude was [unfilled] mv [___V @] : [unfilled] V [___ ms] : [unfilled] ms [Programmed for Longevity] : output reprogrammed for improved battery longevity [Counters Reset] : the counters were reset [Sense ___ %] : Sense [unfilled]% [de-identified] : [facility] [de-identified] : [id number] [de-identified] : [id number] [de-identified] : 09/08/2016 [de-identified] : 40 [de-identified] : No new episodes. CorVue is stable.

## 2019-07-05 ENCOUNTER — APPOINTMENT (OUTPATIENT)
Dept: INTERNAL MEDICINE | Facility: CLINIC | Age: 69
End: 2019-07-05

## 2019-08-12 ENCOUNTER — APPOINTMENT (OUTPATIENT)
Dept: CARDIOLOGY | Facility: CLINIC | Age: 69
End: 2019-08-12

## 2019-11-05 ENCOUNTER — EMERGENCY (EMERGENCY)
Facility: HOSPITAL | Age: 69
LOS: 0 days | Discharge: HOME | End: 2019-11-05
Attending: EMERGENCY MEDICINE | Admitting: EMERGENCY MEDICINE
Payer: MEDICAID

## 2019-11-05 VITALS
HEART RATE: 72 BPM | RESPIRATION RATE: 20 BRPM | TEMPERATURE: 98 F | OXYGEN SATURATION: 98 % | SYSTOLIC BLOOD PRESSURE: 129 MMHG | DIASTOLIC BLOOD PRESSURE: 68 MMHG

## 2019-11-05 VITALS
TEMPERATURE: 100 F | RESPIRATION RATE: 18 BRPM | HEART RATE: 65 BPM | DIASTOLIC BLOOD PRESSURE: 56 MMHG | OXYGEN SATURATION: 95 % | SYSTOLIC BLOOD PRESSURE: 118 MMHG

## 2019-11-05 DIAGNOSIS — R55 SYNCOPE AND COLLAPSE: ICD-10-CM

## 2019-11-05 DIAGNOSIS — J06.9 ACUTE UPPER RESPIRATORY INFECTION, UNSPECIFIED: ICD-10-CM

## 2019-11-05 DIAGNOSIS — R05 COUGH: ICD-10-CM

## 2019-11-05 LAB
ANION GAP SERPL CALC-SCNC: 15 MMOL/L — HIGH (ref 7–14)
APPEARANCE UR: CLEAR — SIGNIFICANT CHANGE UP
BASOPHILS # BLD AUTO: 0.03 K/UL — SIGNIFICANT CHANGE UP (ref 0–0.2)
BASOPHILS NFR BLD AUTO: 0.3 % — SIGNIFICANT CHANGE UP (ref 0–1)
BILIRUB UR-MCNC: NEGATIVE — SIGNIFICANT CHANGE UP
BUN SERPL-MCNC: 9 MG/DL — LOW (ref 10–20)
CALCIUM SERPL-MCNC: 8.9 MG/DL — SIGNIFICANT CHANGE UP (ref 8.5–10.1)
CHLORIDE SERPL-SCNC: 90 MMOL/L — LOW (ref 98–110)
CO2 SERPL-SCNC: 27 MMOL/L — SIGNIFICANT CHANGE UP (ref 17–32)
COLOR SPEC: YELLOW — SIGNIFICANT CHANGE UP
CREAT SERPL-MCNC: 0.6 MG/DL — LOW (ref 0.7–1.5)
DIFF PNL FLD: SIGNIFICANT CHANGE UP
EOSINOPHIL # BLD AUTO: 0.05 K/UL — SIGNIFICANT CHANGE UP (ref 0–0.7)
EOSINOPHIL NFR BLD AUTO: 0.4 % — SIGNIFICANT CHANGE UP (ref 0–8)
FLU A RESULT: NEGATIVE — SIGNIFICANT CHANGE UP
FLU A RESULT: NEGATIVE — SIGNIFICANT CHANGE UP
FLUAV AG NPH QL: NEGATIVE — SIGNIFICANT CHANGE UP
FLUBV AG NPH QL: NEGATIVE — SIGNIFICANT CHANGE UP
GLUCOSE SERPL-MCNC: 132 MG/DL — HIGH (ref 70–99)
GLUCOSE UR QL: NEGATIVE — SIGNIFICANT CHANGE UP
HCT VFR BLD CALC: 35.4 % — LOW (ref 37–47)
HGB BLD-MCNC: 12.6 G/DL — SIGNIFICANT CHANGE UP (ref 12–16)
IMM GRANULOCYTES NFR BLD AUTO: 0.4 % — HIGH (ref 0.1–0.3)
KETONES UR-MCNC: NEGATIVE — SIGNIFICANT CHANGE UP
LEUKOCYTE ESTERASE UR-ACNC: NEGATIVE — SIGNIFICANT CHANGE UP
LYMPHOCYTES # BLD AUTO: 1.3 K/UL — SIGNIFICANT CHANGE UP (ref 1.2–3.4)
LYMPHOCYTES # BLD AUTO: 11.2 % — LOW (ref 20.5–51.1)
MAGNESIUM SERPL-MCNC: 1.7 MG/DL — LOW (ref 1.8–2.4)
MCHC RBC-ENTMCNC: 32.6 PG — HIGH (ref 27–31)
MCHC RBC-ENTMCNC: 35.6 G/DL — SIGNIFICANT CHANGE UP (ref 32–37)
MCV RBC AUTO: 91.5 FL — SIGNIFICANT CHANGE UP (ref 81–99)
MONOCYTES # BLD AUTO: 0.91 K/UL — HIGH (ref 0.1–0.6)
MONOCYTES NFR BLD AUTO: 7.8 % — SIGNIFICANT CHANGE UP (ref 1.7–9.3)
NEUTROPHILS # BLD AUTO: 9.3 K/UL — HIGH (ref 1.4–6.5)
NEUTROPHILS NFR BLD AUTO: 79.9 % — HIGH (ref 42.2–75.2)
NITRITE UR-MCNC: NEGATIVE — SIGNIFICANT CHANGE UP
NRBC # BLD: 0 /100 WBCS — SIGNIFICANT CHANGE UP (ref 0–0)
NT-PROBNP SERPL-SCNC: 979 PG/ML — HIGH (ref 0–300)
PH UR: 7.5 — SIGNIFICANT CHANGE UP (ref 5–8)
PLATELET # BLD AUTO: 266 K/UL — SIGNIFICANT CHANGE UP (ref 130–400)
POTASSIUM SERPL-MCNC: 3.2 MMOL/L — LOW (ref 3.5–5)
POTASSIUM SERPL-SCNC: 3.2 MMOL/L — LOW (ref 3.5–5)
PROT UR-MCNC: SIGNIFICANT CHANGE UP
RBC # BLD: 3.87 M/UL — LOW (ref 4.2–5.4)
RBC # FLD: 12.8 % — SIGNIFICANT CHANGE UP (ref 11.5–14.5)
RSV RESULT: NEGATIVE — SIGNIFICANT CHANGE UP
RSV RNA RESP QL NAA+PROBE: NEGATIVE — SIGNIFICANT CHANGE UP
SODIUM SERPL-SCNC: 132 MMOL/L — LOW (ref 135–146)
SP GR SPEC: 1.02 — SIGNIFICANT CHANGE UP (ref 1.01–1.02)
TROPONIN T SERPL-MCNC: <0.01 NG/ML — SIGNIFICANT CHANGE UP
UROBILINOGEN FLD QL: SIGNIFICANT CHANGE UP
WBC # BLD: 11.64 K/UL — HIGH (ref 4.8–10.8)
WBC # FLD AUTO: 11.64 K/UL — HIGH (ref 4.8–10.8)

## 2019-11-05 PROCEDURE — 93010 ELECTROCARDIOGRAM REPORT: CPT | Mod: 76

## 2019-11-05 PROCEDURE — 99285 EMERGENCY DEPT VISIT HI MDM: CPT

## 2019-11-05 PROCEDURE — 70450 CT HEAD/BRAIN W/O DYE: CPT | Mod: 26

## 2019-11-05 PROCEDURE — 71045 X-RAY EXAM CHEST 1 VIEW: CPT | Mod: 26

## 2019-11-05 RX ORDER — POTASSIUM CHLORIDE 20 MEQ
40 PACKET (EA) ORAL ONCE
Refills: 0 | Status: COMPLETED | OUTPATIENT
Start: 2019-11-05 | End: 2019-11-05

## 2019-11-05 RX ORDER — SODIUM CHLORIDE 9 MG/ML
1000 INJECTION, SOLUTION INTRAVENOUS ONCE
Refills: 0 | Status: COMPLETED | OUTPATIENT
Start: 2019-11-05 | End: 2019-11-05

## 2019-11-05 RX ADMIN — Medication 40 MILLIEQUIVALENT(S): at 15:56

## 2019-11-05 RX ADMIN — SODIUM CHLORIDE 1000 MILLILITER(S): 9 INJECTION, SOLUTION INTRAVENOUS at 12:54

## 2019-11-05 NOTE — ED ADULT NURSE NOTE - CHIEF COMPLAINT QUOTE
syncopal episode today, denies any n/v/d, c/o feeling "weak". on plavix, no LOC. denies any cp, sob or dizziness at present time

## 2019-11-05 NOTE — ED PROVIDER NOTE - NS ED ROS FT
Constitutional:  See HPI  Eyes:  No visual changes  ENMT: No neck pain or stiffness  Cardiac:  No chest pain  Respiratory:  +cough. no respiratory distress.   GI:  No nausea, vomiting, diarrhea or abdominal pain.  :  No dysuria, frequency or burning.  MS:  No back pain.  Neuro:  No headache   Skin:  No skin rash  Except as documented in the HPI,  all other systems are negative

## 2019-11-05 NOTE — ED PROVIDER NOTE - NSFOLLOWUPINSTRUCTIONS_ED_ALL_ED_FT
Viral Respiratory Infection    A viral respiratory infection is an illness that affects parts of the body used for breathing, like the lungs, nose, and throat. It is caused by a germ called a virus. Symptoms can include runny nose, coughing, sneezing, fatigue, body aches, sore throat, fever, or headache. Over the counter medicine can be used to manage the symptoms but the infection typically goes away on its own in 5 to 10 days.     SEEK IMMEDIATE MEDICAL CARE IF YOU HAVE ANY OF THE FOLLOWING SYMPTOMS: shortness of breath, chest pain, fever over 10 days, or lightheadedness/dizziness.    Cough    Coughing is a reflex that clears your throat and your airways. Coughing helps to heal and protect your lungs. It is normal to cough occasionally, but a cough that happens with other symptoms or lasts a long time may be a sign of a condition that needs treatment. Coughing may be caused by infections, asthma or COPD, smoking, postnasal drip, gastroesophageal reflux, as well as other medical conditions. Take medicines only as instructed by your health care provider. Avoid environments or triggers that causes you to cough at work or at home.    SEEK IMMEDIATE MEDICAL CARE IF YOU HAVE ANY OF THE FOLLOWING SYMPTOMS: coughing up blood, shortness of breath, rapid heart rate, chest pain, unexplained weight loss or night sweats.

## 2019-11-05 NOTE — ED PROVIDER NOTE - PROGRESS NOTE DETAILS
ATTENDING NOTE: 68 y/o female S/P CABG, ICD. Sat down on the floor today to get something out of a low drawer. Then felt too weak to get up. Son tried to pick her up but she was too weka. She lost continence ofurine. no seizure activity. No chest pain, no SOB, no palpitations, no syncope. Has had fever to 39 degrees C x 2 days. No H/A, no neck pain, non-productive cough, no chest pain, no SOB, no ABD pain, no N/V/D, no dysuria/frequency/urgency.  O/E: Constitutional: Non-toxic in appearance. Eyes: PERRL. EOMI. No pallor, no jaundice. Neck: Neck supple, no meningeal signs. Respiratory: Lungs CTA and equal b/l. Cardio: S1 S2 regular, no murmur. ABD: ABD soft, no tenderness. Extremities: No pedal edema, no calf tenderness. Skin: No skin rash. Neuro: A&O x 3, CN II-XII intact, strength 5/5 b/l, sensation intact and equal, finger-to-nose intact.  EKG: sinus, 1 mm ST elevation with Q waves in leads III, aVF, V4-V6, with 1 mm ST depressions I and aVL. Not much different than EKG in 2017.  Imp: Febrile illness. ?source. No syncope. No sign of MI.  A/P: IV fluids, check labs, flu swab, CXR, U/A, will check Troponin, interrogate ICD. S/w EP, device interrogated, no events, functioning well s/o Dr. Laurent.

## 2019-11-05 NOTE — ED PROVIDER NOTE - PHYSICAL EXAMINATION
CONSTITUTIONAL: Well-developed; well-nourished; in no acute distress.   SKIN: warm, dry  HEAD: Normocephalic; atraumatic.  EYES: PERRL, EOMI, normal sclera and conjunctiva   ENT: No nasal discharge; airway clear. no tongue biting  NECK: Supple; non tender.  CARD: S1, S2 normal; no murmurs, gallops, or rubs. Regular rate and rhythm.   RESP: No wheezes, rales or rhonchi.  ABD: soft ntnd  EXT: Normal ROM.  No clubbing, cyanosis or edema.   LYMPH: No acute cervical adenopathy.  NEURO: Alert, oriented, grossly unremarkable. no focal deficits, CN 2 - 12 grossly intact, no cerebellar signs, normal gait  PSYCH: Cooperative, appropriate.

## 2019-11-05 NOTE — ED PROVIDER NOTE - PATIENT PORTAL LINK FT
You can access the FollowMyHealth Patient Portal offered by [facility] by registering at the following website: [email or web address]. By joining [facility] FollowMyHealth portal, you will also be able to view your health information using other applications (apps) compatible with our system.

## 2019-11-05 NOTE — ED PROVIDER NOTE - CLINICAL SUMMARY MEDICAL DECISION MAKING FREE TEXT BOX
Labs, CXR, EKG ok. PPM interrogated, no events. Pt c/o cough and nasal congestion which persists, but weakness resolved. No focal deficits. Pt feeling better and would like to go home, will d/c with prompt f/u, pt to return to the ED for worsening sx.

## 2019-11-05 NOTE — ED ADULT TRIAGE NOTE - CHIEF COMPLAINT QUOTE
syncopal episode today, denies any n/v/d, c/o feeling "weak". on plavix, no LOC syncopal episode today, denies any n/v/d, c/o feeling "weak". on plavix, no LOC. denies any cp, sob or dizziness at present time

## 2019-11-05 NOTE — ED PROVIDER NOTE - OBJECTIVE STATEMENT
pt is a 69 yof s/p CABG, ICD here for sudden weakness experienced this morning while bending down to access a drawer. Pt son witnessed event, and saw pt with urinary incontinence, so he brought pt in to ED. pt has been having a cough, chills and subjective fevers for the past few days. denies n/v/d, abd pain, dysuria, seizure activity

## 2019-11-05 NOTE — ED ADULT NURSE NOTE - OBJECTIVE STATEMENT
Pt presented from home. As per son, pt was bending down to get something from a bottom drawer and was too weak to get herself up. Son tried to help pt up, but was unable to due to weakness. While son was trying to lift pt up she had an episode of incontinence, which has not happened before. As per son, pt has had fevers for past week. No fever at this time. Alert and oriented x3. No obvious deformities/trauma noted.

## 2019-11-05 NOTE — ED PROVIDER NOTE - CARE PROVIDER_API CALL
Gregory Stein (DO)  Medicine  [facility]  [address] [place] [address]  Phone: [phone]  Fax: [phone]  Follow Up Time:

## 2019-11-25 ENCOUNTER — LABORATORY RESULT (OUTPATIENT)
Age: 69
End: 2019-11-25

## 2019-11-25 ENCOUNTER — OUTPATIENT (OUTPATIENT)
Dept: OUTPATIENT SERVICES | Facility: HOSPITAL | Age: 69
LOS: 1 days | Discharge: HOME | End: 2019-11-25

## 2019-11-25 ENCOUNTER — APPOINTMENT (OUTPATIENT)
Dept: CARDIOLOGY | Facility: CLINIC | Age: 69
End: 2019-11-25
Payer: MEDICAID

## 2019-11-25 VITALS
DIASTOLIC BLOOD PRESSURE: 64 MMHG | HEIGHT: 62 IN | BODY MASS INDEX: 31.65 KG/M2 | WEIGHT: 172 LBS | TEMPERATURE: 98.5 F | SYSTOLIC BLOOD PRESSURE: 103 MMHG | HEART RATE: 66 BPM

## 2019-11-25 LAB
BASOPHILS # BLD AUTO: 0.03 K/UL
BASOPHILS NFR BLD AUTO: 0.6 %
EOSINOPHIL # BLD AUTO: 0.05 K/UL
EOSINOPHIL NFR BLD AUTO: 0.9 %
ESTIMATED AVERAGE GLUCOSE: 123 MG/DL
HBA1C MFR BLD HPLC: 5.9 %
HCT VFR BLD CALC: 36.5 %
HGB BLD-MCNC: 12.5 G/DL
IMM GRANULOCYTES NFR BLD AUTO: 0.2 %
LYMPHOCYTES # BLD AUTO: 1.9 K/UL
LYMPHOCYTES NFR BLD AUTO: 35.3 %
MAN DIFF?: NORMAL
MCHC RBC-ENTMCNC: 31.9 PG
MCHC RBC-ENTMCNC: 34.2 G/DL
MCV RBC AUTO: 93.1 FL
MONOCYTES # BLD AUTO: 0.59 K/UL
MONOCYTES NFR BLD AUTO: 11 %
NEUTROPHILS # BLD AUTO: 2.8 K/UL
NEUTROPHILS NFR BLD AUTO: 52 %
PLATELET # BLD AUTO: 344 K/UL
RBC # BLD: 3.92 M/UL
RBC # FLD: 12.7 %
WBC # FLD AUTO: 5.38 K/UL

## 2019-11-25 PROCEDURE — 99213 OFFICE O/P EST LOW 20 MIN: CPT

## 2019-11-25 NOTE — HISTORY OF PRESENT ILLNESS
[FreeTextEntry1] : 69 years old female with history of CAD s/p CABG x 4 (LIMA to LAD, SVG to D2, SVG to RCA, SVG to ramous/M2 in 2011), ischemic cardiomyopathy s/p AICD, HFimEF, HTN, DLD, presented for follow up of her heart failure. Patient denies chest pain or shortness of breath at rest, but when the symptoms start when she climbs stairs or walking fast. She also reports orthopnea, so she needs a big pillow to keep her head up. Compared to last visit she has been not feel well with her breathing status. Chest pain is substernal, relieved by rest. Patient has been compliant with her medication. Denies fever/chills, abdominal pain, n/v/d/c. \par \par \par ECHO 4/26/19: EF 40-45%, basal inferolateral akinesis, mid/apical inferior wall and mid inferolateral hypokinesis\par MUGA 8/31/16: 50%\par EKG 11/5/19: normal sinus rhythm with pathologic Q wave at II, III, aVF, 1st degree AV block, LVH\par Device interrogation 6/27/19: normal device function

## 2019-11-25 NOTE — ASSESSMENT
[FreeTextEntry1] : 68 yo F with PMH of HTN, DLD, ischemic cardiomyopathy, HFrEF s/p AICD, CAD s/p CABG + PCI, hyperkalemia here at clinic for routine follow up.\par \par # HFrEF\par - ECHO 4/26/19: EF 40-45%, basal inferolateral akinesis, mid/apical inferior wall and mid inferolateral hypokinesisand\par - MUGA- 50%\par - EKG 11/5/19: normal sinus rhythm with pathologic Q wave at II, III, aVF, 1st degree AV block, LVH\par - LEXISCAN stress test\par \par # CAD s/p PCI\par - Reports intermittent chest pain and shortness of breath, and worsening breathing status\par - Continue ASA, plavix, BB,Statin and ACE\par \par # Chronic Stable Angina\par - On Ranexa and Nitro\par - Increase Ranexa to 1000mg q12hrs\par \par # Hyperkalemia\par -Will recheck labs, no new labs since 2019\par - Pending CMP for LFTs and K with PMD\par \par - Follow up in 6 weeks\par

## 2019-11-25 NOTE — END OF VISIT
[] : Resident [FreeTextEntry3] : Seen / examined and above reviewed.\par \par CAD s/p CABG, ICM s/p AICD, chronic systolic CHF.\par ECHO (2018): EF 40-45%. PN filling. Mild - Mod MR.\par MUGA (2016): EF 50%.\par \par Interval worsening of angina.  CP / dyspnea with moderate exertion, especially steps.  Symptoms seem considerable /  and son notice a difference.\par \par BP controlled.\par Clinically compensated / euvolemic.\par \par - Cont ASA, Plavix, Lipitor.\par - Cont Toprol, Losartan, Chlorthalidone.\par - Cont Imdur.\par - Increase Ranexa 100mg q12.\par - Lexiscan NST.  Will consider cath pending findings (multiple prior angiograms).\par - Follow-up 6-weeks.\par - Regular PMD follow-up.\par - Follow-up 4-months.

## 2019-11-25 NOTE — REVIEW OF SYSTEMS
[Eyeglasses] : currently wearing eyeglasses [Shortness Of Breath] : shortness of breath [Dyspnea on exertion] : dyspnea during exertion [Chest Pain] : chest pain [Headache] : no headache [Fever] : no fever [Chills] : no chills [Blurry Vision] : no blurred vision [Seeing Double (Diplopia)] : no diplopia [Earache] : no earache [Loss Of Hearing] : no hearing loss [Palpitations] : no palpitations [Lower Ext Edema] : no extremity edema [Cough] : no cough [Wheezing] : no wheezing [Nausea] : no nausea [Abdominal Pain] : no abdominal pain [Joint Pain] : no joint pain [Vomiting] : no vomiting [Joint Swelling] : no joint swelling [Skin: A Rash] : no rash: [Dizziness] : no dizziness [Confusion] : no confusion was observed

## 2019-11-25 NOTE — PHYSICAL EXAM
[General Appearance - Well Developed] : well developed [General Appearance - Well Nourished] : well nourished [Normal Appearance] : normal appearance [Normal Conjunctiva] : the conjunctiva exhibited no abnormalities [Normal Oral Mucosa] : normal oral mucosa [] : no respiratory distress [Respiration, Rhythm And Depth] : normal respiratory rhythm and effort [Chest Palpation] : palpation of the chest revealed no abnormalities [Auscultation Breath Sounds / Voice Sounds] : lungs were clear to auscultation bilaterally [Heart Rate And Rhythm] : heart rate and rhythm were normal [Heart Sounds] : normal S1 and S2 [Murmurs] : no murmurs present [Arterial Pulses Normal] : the arterial pulses were normal [Edema] : no peripheral edema present [Bowel Sounds] : normal bowel sounds [Abdomen Soft] : soft [Abdomen Tenderness] : non-tender [Abdomen Mass (___ Cm)] : no abdominal mass palpated [Abnormal Walk] : normal gait [Nail Clubbing] : no clubbing of the fingernails [Cyanosis, Localized] : no localized cyanosis [Skin Turgor] : normal skin turgor [Impaired Insight] : insight and judgment were intact [Oriented To Time, Place, And Person] : oriented to person, place, and time [Memory Recent] : recent memory was not impaired [Affect] : the affect was normal

## 2019-11-26 DIAGNOSIS — I25.10 ATHEROSCLEROTIC HEART DISEASE OF NATIVE CORONARY ARTERY WITHOUT ANGINA PECTORIS: ICD-10-CM

## 2019-11-26 DIAGNOSIS — I10 ESSENTIAL (PRIMARY) HYPERTENSION: ICD-10-CM

## 2019-11-26 DIAGNOSIS — E78.5 HYPERLIPIDEMIA, UNSPECIFIED: ICD-10-CM

## 2019-11-26 DIAGNOSIS — I25.5 ISCHEMIC CARDIOMYOPATHY: ICD-10-CM

## 2019-11-26 DIAGNOSIS — I50.22 CHRONIC SYSTOLIC (CONGESTIVE) HEART FAILURE: ICD-10-CM

## 2019-11-27 LAB
ALBUMIN SERPL ELPH-MCNC: 4.5 G/DL
ALP BLD-CCNC: 51 U/L
ALT SERPL-CCNC: 21 U/L
ANION GAP SERPL CALC-SCNC: 16 MMOL/L
AST SERPL-CCNC: 27 U/L
BILIRUB SERPL-MCNC: 0.6 MG/DL
BUN SERPL-MCNC: 11 MG/DL
CALCIUM SERPL-MCNC: 9.5 MG/DL
CHLORIDE SERPL-SCNC: 96 MMOL/L
CHOLEST SERPL-MCNC: 146 MG/DL
CHOLEST/HDLC SERPL: 2.1 RATIO
CO2 SERPL-SCNC: 25 MMOL/L
CREAT SERPL-MCNC: 0.8 MG/DL
GLUCOSE SERPL-MCNC: 114 MG/DL
HDLC SERPL-MCNC: 71 MG/DL
LDLC SERPL CALC-MCNC: 65 MG/DL
POTASSIUM SERPL-SCNC: 4.1 MMOL/L
PROT SERPL-MCNC: 7.1 G/DL
SODIUM SERPL-SCNC: 137 MMOL/L
TRIGL SERPL-MCNC: 108 MG/DL
TSH SERPL-ACNC: 4.34 UIU/ML

## 2019-12-03 ENCOUNTER — MEDICATION RENEWAL (OUTPATIENT)
Age: 69
End: 2019-12-03

## 2019-12-06 ENCOUNTER — APPOINTMENT (OUTPATIENT)
Dept: INTERNAL MEDICINE | Facility: CLINIC | Age: 69
End: 2019-12-06

## 2019-12-16 ENCOUNTER — FORM ENCOUNTER (OUTPATIENT)
Age: 69
End: 2019-12-16

## 2019-12-17 ENCOUNTER — OUTPATIENT (OUTPATIENT)
Dept: OUTPATIENT SERVICES | Facility: HOSPITAL | Age: 69
LOS: 1 days | Discharge: HOME | End: 2019-12-17
Payer: MEDICAID

## 2019-12-17 DIAGNOSIS — I25.10 ATHEROSCLEROTIC HEART DISEASE OF NATIVE CORONARY ARTERY WITHOUT ANGINA PECTORIS: ICD-10-CM

## 2019-12-17 PROCEDURE — 93016 CV STRESS TEST SUPVJ ONLY: CPT

## 2019-12-17 PROCEDURE — 78452 HT MUSCLE IMAGE SPECT MULT: CPT | Mod: 26

## 2019-12-17 PROCEDURE — 93018 CV STRESS TEST I&R ONLY: CPT

## 2019-12-17 RX ORDER — REGADENOSON 0.08 MG/ML
0.4 INJECTION, SOLUTION INTRAVENOUS ONCE
Refills: 0 | Status: DISCONTINUED | OUTPATIENT
Start: 2019-12-17 | End: 2020-01-03

## 2020-01-02 ENCOUNTER — APPOINTMENT (OUTPATIENT)
Dept: CARDIOLOGY | Facility: CLINIC | Age: 70
End: 2020-01-02
Payer: MEDICAID

## 2020-01-02 VITALS
WEIGHT: 170 LBS | HEART RATE: 66 BPM | HEIGHT: 62 IN | BODY MASS INDEX: 31.28 KG/M2 | DIASTOLIC BLOOD PRESSURE: 71 MMHG | SYSTOLIC BLOOD PRESSURE: 101 MMHG

## 2020-01-02 PROCEDURE — 93282 PRGRMG EVAL IMPLANTABLE DFB: CPT

## 2020-01-02 PROCEDURE — 99213 OFFICE O/P EST LOW 20 MIN: CPT

## 2020-01-02 PROCEDURE — 93290 INTERROG DEV EVAL ICPMS IP: CPT

## 2020-01-02 NOTE — PHYSICAL EXAM
[General Appearance - Well Developed] : well developed [Normal Appearance] : normal appearance [Well Groomed] : well groomed [General Appearance - In No Acute Distress] : no acute distress [No Deformities] : no deformities [General Appearance - Well Nourished] : well nourished [Heart Sounds] : normal S1 and S2 [Heart Rate And Rhythm] : heart rate and rhythm were normal [Respiration, Rhythm And Depth] : normal respiratory rhythm and effort [Murmurs] : no murmurs present [Exaggerated Use Of Accessory Muscles For Inspiration] : no accessory muscle use [Auscultation Breath Sounds / Voice Sounds] : lungs were clear to auscultation bilaterally [Dry] : dry [Well-Healed] : well-healed [Clean] : clean [Abdomen Soft] : soft [Abdomen Tenderness] : non-tender [Abdomen Mass (___ Cm)] : no abdominal mass palpated [Nail Clubbing] : no clubbing of the fingernails [Cyanosis, Localized] : no localized cyanosis [Petechial Hemorrhages (___cm)] : no petechial hemorrhages [] : no ischemic changes

## 2020-01-02 NOTE — PROCEDURE
[No] : not [NSR] : normal sinus rhythm [ICD] : Implantable cardioverter-defibrillator [VVI] : VVI [Charge Time: ___ sec] : charge time was [unfilled] seconds [Impedance: ___ Ohms] : current cell impedance is [unfilled] Ohms [Longevity: ___ months] : The estimated remaining battery life is [unfilled] months [Lead Imp:  ___ohms] : lead impedance was [unfilled] ohms [Threshold Testing Performed] : Threshold testing was performed [Normal] : The battery status is normal. [Sensing Amplitude ___mv] : sensing amplitude was [unfilled] mv [___V @] : [unfilled] V [___ ms] : [unfilled] ms [Programmed for Longevity] : output reprogrammed for improved battery longevity [Counters Reset] : the counters were reset [Sense ___ %] : Sense [unfilled]% [Pace ___ %] : Pace [unfilled]% [de-identified] : [facility] [de-identified] : [id number] [de-identified] : 09/08/2016 [de-identified] : [id number] [de-identified] : No new episodes. CorVue is stable.  [de-identified] : 40

## 2020-01-02 NOTE — HISTORY OF PRESENT ILLNESS
[Palpitations] : no palpitations [None] : The patient complains of no symptoms [SOB] : no dyspnea [Syncope] : no syncope [Dizziness] : no dizziness [Swelling at Site] : no swelling at device site [Chest Pain] : no chest pain or discomfort [Erythema at Site] : no erythema at device site

## 2020-01-06 ENCOUNTER — OUTPATIENT (OUTPATIENT)
Dept: OUTPATIENT SERVICES | Facility: HOSPITAL | Age: 70
LOS: 1 days | Discharge: HOME | End: 2020-01-06

## 2020-01-06 ENCOUNTER — APPOINTMENT (OUTPATIENT)
Dept: CARDIOLOGY | Facility: CLINIC | Age: 70
End: 2020-01-06
Payer: MEDICAID

## 2020-01-06 VITALS
WEIGHT: 172 LBS | BODY MASS INDEX: 31.65 KG/M2 | HEART RATE: 65 BPM | SYSTOLIC BLOOD PRESSURE: 126 MMHG | TEMPERATURE: 97.5 F | HEIGHT: 62 IN | DIASTOLIC BLOOD PRESSURE: 78 MMHG

## 2020-01-06 PROCEDURE — 99213 OFFICE O/P EST LOW 20 MIN: CPT

## 2020-01-06 RX ORDER — SERTRALINE HYDROCHLORIDE 50 MG/1
50 TABLET, FILM COATED ORAL
Qty: 30 | Refills: 11 | Status: DISCONTINUED | COMMUNITY
Start: 2019-06-14 | End: 2020-01-06

## 2020-01-06 NOTE — PHYSICAL EXAM
[General Appearance - Well Developed] : well developed [Normal Appearance] : normal appearance [Normal Conjunctiva] : the conjunctiva exhibited no abnormalities [General Appearance - Well Nourished] : well nourished [Normal Oral Mucosa] : normal oral mucosa [Respiration, Rhythm And Depth] : normal respiratory rhythm and effort [] : no respiratory distress [Auscultation Breath Sounds / Voice Sounds] : lungs were clear to auscultation bilaterally [Chest Palpation] : palpation of the chest revealed no abnormalities [Heart Rate And Rhythm] : heart rate and rhythm were normal [Heart Sounds] : normal S1 and S2 [Murmurs] : no murmurs present [Arterial Pulses Normal] : the arterial pulses were normal [Edema] : no peripheral edema present [Bowel Sounds] : normal bowel sounds [Abdomen Soft] : soft [Abdomen Tenderness] : non-tender [Abdomen Mass (___ Cm)] : no abdominal mass palpated [Abnormal Walk] : normal gait [Nail Clubbing] : no clubbing of the fingernails [Cyanosis, Localized] : no localized cyanosis [Skin Turgor] : normal skin turgor [Affect] : the affect was normal [Oriented To Time, Place, And Person] : oriented to person, place, and time [Impaired Insight] : insight and judgment were intact [Memory Recent] : recent memory was not impaired

## 2020-01-08 ENCOUNTER — FORM ENCOUNTER (OUTPATIENT)
Age: 70
End: 2020-01-08

## 2020-01-09 ENCOUNTER — OUTPATIENT (OUTPATIENT)
Dept: OUTPATIENT SERVICES | Facility: HOSPITAL | Age: 70
LOS: 1 days | Discharge: HOME | End: 2020-01-09
Payer: MEDICAID

## 2020-01-09 DIAGNOSIS — I25.10 ATHEROSCLEROTIC HEART DISEASE OF NATIVE CORONARY ARTERY WITHOUT ANGINA PECTORIS: ICD-10-CM

## 2020-01-09 PROCEDURE — 93306 TTE W/DOPPLER COMPLETE: CPT | Mod: 26

## 2020-01-10 ENCOUNTER — OUTPATIENT (OUTPATIENT)
Dept: OUTPATIENT SERVICES | Facility: HOSPITAL | Age: 70
LOS: 1 days | Discharge: HOME | End: 2020-01-10
Payer: MEDICAID

## 2020-01-10 VITALS
RESPIRATION RATE: 16 BRPM | DIASTOLIC BLOOD PRESSURE: 60 MMHG | TEMPERATURE: 97 F | SYSTOLIC BLOOD PRESSURE: 134 MMHG | HEIGHT: 62 IN | HEART RATE: 62 BPM | WEIGHT: 171.96 LBS | OXYGEN SATURATION: 97 %

## 2020-01-10 DIAGNOSIS — Z95.810 PRESENCE OF AUTOMATIC (IMPLANTABLE) CARDIAC DEFIBRILLATOR: Chronic | ICD-10-CM

## 2020-01-10 DIAGNOSIS — Z01.818 ENCOUNTER FOR OTHER PREPROCEDURAL EXAMINATION: ICD-10-CM

## 2020-01-10 DIAGNOSIS — Z95.1 PRESENCE OF AORTOCORONARY BYPASS GRAFT: Chronic | ICD-10-CM

## 2020-01-10 DIAGNOSIS — I25.10 ATHEROSCLEROTIC HEART DISEASE OF NATIVE CORONARY ARTERY WITHOUT ANGINA PECTORIS: ICD-10-CM

## 2020-01-10 DIAGNOSIS — Z98.890 OTHER SPECIFIED POSTPROCEDURAL STATES: Chronic | ICD-10-CM

## 2020-01-10 LAB
ALBUMIN SERPL ELPH-MCNC: 4.3 G/DL — SIGNIFICANT CHANGE UP (ref 3.5–5.2)
ALP SERPL-CCNC: 63 U/L — SIGNIFICANT CHANGE UP (ref 30–115)
ALT FLD-CCNC: 22 U/L — SIGNIFICANT CHANGE UP (ref 0–41)
ANION GAP SERPL CALC-SCNC: 10 MMOL/L — SIGNIFICANT CHANGE UP (ref 7–14)
APTT BLD: 32.7 SEC — SIGNIFICANT CHANGE UP (ref 27–39.2)
AST SERPL-CCNC: 24 U/L — SIGNIFICANT CHANGE UP (ref 0–41)
BASOPHILS # BLD AUTO: 0.03 K/UL — SIGNIFICANT CHANGE UP (ref 0–0.2)
BASOPHILS NFR BLD AUTO: 0.4 % — SIGNIFICANT CHANGE UP (ref 0–1)
BILIRUB SERPL-MCNC: 0.2 MG/DL — SIGNIFICANT CHANGE UP (ref 0.2–1.2)
BUN SERPL-MCNC: 10 MG/DL — SIGNIFICANT CHANGE UP (ref 10–20)
CALCIUM SERPL-MCNC: 9.6 MG/DL — SIGNIFICANT CHANGE UP (ref 8.5–10.1)
CHLORIDE SERPL-SCNC: 100 MMOL/L — SIGNIFICANT CHANGE UP (ref 98–110)
CO2 SERPL-SCNC: 29 MMOL/L — SIGNIFICANT CHANGE UP (ref 17–32)
CREAT SERPL-MCNC: 0.7 MG/DL — SIGNIFICANT CHANGE UP (ref 0.7–1.5)
EOSINOPHIL # BLD AUTO: 0.06 K/UL — SIGNIFICANT CHANGE UP (ref 0–0.7)
EOSINOPHIL NFR BLD AUTO: 0.9 % — SIGNIFICANT CHANGE UP (ref 0–8)
GLUCOSE SERPL-MCNC: 98 MG/DL — SIGNIFICANT CHANGE UP (ref 70–99)
HCT VFR BLD CALC: 35.8 % — LOW (ref 37–47)
HGB BLD-MCNC: 12.2 G/DL — SIGNIFICANT CHANGE UP (ref 12–16)
IMM GRANULOCYTES NFR BLD AUTO: 0.3 % — SIGNIFICANT CHANGE UP (ref 0.1–0.3)
INR BLD: 1 RATIO — SIGNIFICANT CHANGE UP (ref 0.65–1.3)
LYMPHOCYTES # BLD AUTO: 2.69 K/UL — SIGNIFICANT CHANGE UP (ref 1.2–3.4)
LYMPHOCYTES # BLD AUTO: 39.3 % — SIGNIFICANT CHANGE UP (ref 20.5–51.1)
MCHC RBC-ENTMCNC: 31.7 PG — HIGH (ref 27–31)
MCHC RBC-ENTMCNC: 34.1 G/DL — SIGNIFICANT CHANGE UP (ref 32–37)
MCV RBC AUTO: 93 FL — SIGNIFICANT CHANGE UP (ref 81–99)
MONOCYTES # BLD AUTO: 0.85 K/UL — HIGH (ref 0.1–0.6)
MONOCYTES NFR BLD AUTO: 12.4 % — HIGH (ref 1.7–9.3)
NEUTROPHILS # BLD AUTO: 3.2 K/UL — SIGNIFICANT CHANGE UP (ref 1.4–6.5)
NEUTROPHILS NFR BLD AUTO: 46.7 % — SIGNIFICANT CHANGE UP (ref 42.2–75.2)
NRBC # BLD: 0 /100 WBCS — SIGNIFICANT CHANGE UP (ref 0–0)
PLATELET # BLD AUTO: 262 K/UL — SIGNIFICANT CHANGE UP (ref 130–400)
POTASSIUM SERPL-MCNC: 3.4 MMOL/L — LOW (ref 3.5–5)
POTASSIUM SERPL-SCNC: 3.4 MMOL/L — LOW (ref 3.5–5)
PROT SERPL-MCNC: 6.8 G/DL — SIGNIFICANT CHANGE UP (ref 6–8)
PROTHROM AB SERPL-ACNC: 11.5 SEC — SIGNIFICANT CHANGE UP (ref 9.95–12.87)
RBC # BLD: 3.85 M/UL — LOW (ref 4.2–5.4)
RBC # FLD: 12.8 % — SIGNIFICANT CHANGE UP (ref 11.5–14.5)
SODIUM SERPL-SCNC: 139 MMOL/L — SIGNIFICANT CHANGE UP (ref 135–146)
WBC # BLD: 6.85 K/UL — SIGNIFICANT CHANGE UP (ref 4.8–10.8)
WBC # FLD AUTO: 6.85 K/UL — SIGNIFICANT CHANGE UP (ref 4.8–10.8)

## 2020-01-10 PROCEDURE — 93010 ELECTROCARDIOGRAM REPORT: CPT

## 2020-01-10 NOTE — H&P PST ADULT - BIRTH SEX
Encounter Date: 10/14/2019    SCRIBE #1 NOTE: I, Lance Cobian, am scribing for, and in the presence of,  Dr. Caraballo. I have scribed the following portions of the note - Other sections scribed: HPI, ROS, PE.       History     Chief Complaint   Patient presents with    Emesis     Pt reports nausea and vomiting that began after waking up from a nap today.      This is a 89 y.o. Female with history of cirrhosis, GERD, HTN who presents to the ED complaining of nausea and vomiting that started today in the morning. The patient received her flu shot yesterday, ate, went home, slept, and woke up with her symptoms. She reports malaise and intermittent leg cramps for 1.5 months. Patient denies fever, chills, diaphoresis, HA, ear pain, sore throat, CP, cough, SOB, abdominal pain, and dysuria. She took tramadol 1 hour PTA.       The history is provided by the patient and a relative. No  was used.     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 lj- fosamax    PSVT (paroxysmal supraventricular tachycardia)     dr. marx    PVC (premature ventricular contraction) 11/10/2012    Rectocele     Status post partial lobectomy of lung 2/2010    Thyroid disease     hypothyroidism    Trochanteric bursitis 4/18/2013    Urine incontinence 2/24/2015    Vitamin  D deficiency disease 9/4/2014     Past Surgical History:   Procedure Laterality Date    ADENOIDECTOMY      APPENDECTOMY      CHOLECYSTECTOMY      COLONOSCOPY      CYSTOSCOPY      EYE SURGERY Bilateral     HYSTERECTOMY      LUNG LOBECTOMY Right     OOPHORECTOMY      TONSILLECTOMY      UPPER GASTROINTESTINAL ENDOSCOPY       Family History   Problem Relation Age of Onset    Cancer Mother         COLON    Hypertension Mother     Aneurysm Father     Alcohol abuse Brother     Kidney failure Sister     Cancer Brother         throat-smoker     Alcohol abuse Brother     Pacemaker/defibrilator Brother     Birth defects Paternal Grandfather         prostate     Asthma Neg Hx     Emphysema Neg Hx     Amblyopia Neg Hx     Blindness Neg Hx     Cataracts Neg Hx     Diabetes Neg Hx     Glaucoma Neg Hx     Macular degeneration Neg Hx     Retinal detachment Neg Hx     Strabismus Neg Hx     Stroke Neg Hx     Thyroid disease Neg Hx     Cirrhosis Neg Hx      Social History     Tobacco Use    Smoking status: Never Smoker    Smokeless tobacco: Never Used   Substance Use Topics    Alcohol use: No    Drug use: No     Review of Systems   Constitutional: Negative for chills and fever.        Positive for malaise   HENT: Negative for ear pain and sore throat.    Eyes: Negative for pain.   Respiratory: Negative for cough and shortness of breath.    Cardiovascular: Negative for chest pain.   Gastrointestinal: Positive for nausea and vomiting. Negative for abdominal pain and diarrhea.   Genitourinary: Negative for dysuria.   Musculoskeletal: Positive for myalgias (leg cramps).   Skin: Negative for rash.   Neurological: Negative for headaches.       Physical Exam     Initial Vitals [10/14/19 1600]   BP Pulse Resp Temp SpO2   (!) 141/75 (!) 124 18 99.5 °F (37.5 °C) (!) 94 %      MAP       --         Physical Exam  The patient was examined specifically for the following:   General:No significant distress, Good  color, Warm and dry. Head and neck:Scalp atraumatic, Neck supple. Neurological:Appropriate conversation, Gross motor deficits. Eyes:Conjugate gaze, Clear corneas. ENT: No epistaxis. Cardiac: Regular rate and rhythm, Grossly normal heart tones. Pulmonary: Wheezing, Rales. Gastrointestinal: Abdominal tenderness, Abdominal distention. Musculoskeletal: Extremity deformity, Apparent pain with range of motion of the joints. Skin: Rash.   The findings on examination were normal except for the following:  The patient is having chills. She is tremulous.  The lungs are clear.  The heart tones are normal.  The abdomen is soft.  ED Course   Critical Care  Date/Time: 10/17/2019 7:43 AM  Performed by: Tony Caraballo MD  Authorized by: Cheryl Park MD   Direct patient critical care time: 28 minutes  Additional history critical care time: 11 minutes  Ordering / reviewing critical care time: 11 minutes  Documentation critical care time: 11 minutes  Consulting other physicians critical care time: 5 minutes  Total critical care time (exclusive of procedural time) : 66 minutes  Critical care time was exclusive of separately billable procedures and treating other patients and teaching time.  Critical care was necessary to treat or prevent imminent or life-threatening deterioration of the following conditions: circulatory failure and sepsis.  Critical care was time spent personally by me on the following activities: development of treatment plan with patient or surrogate, discussions with primary provider, evaluation of patient's response to treatment, examination of patient, obtaining history from patient or surrogate, ordering and performing treatments and interventions, ordering and review of laboratory studies, ordering and review of radiographic studies, pulse oximetry, re-evaluation of patient's condition and review of old charts.        Labs Reviewed   CBC W/ AUTO DIFFERENTIAL - Abnormal; Notable for the following components:        Result Value    Hemoglobin 16.3 (*)     Hematocrit 49.6 (*)     Platelets 116 (*)     Lymph # 0.1 (*)     Mono # 0.0 (*)     Gran% 96.0 (*)     Lymph% 2.8 (*)     Mono% 0.5 (*)     All other components within normal limits   COMPREHENSIVE METABOLIC PANEL - Abnormal; Notable for the following components:    Glucose 146 (*)     All other components within normal limits   LACTIC ACID, PLASMA - Abnormal; Notable for the following components:    Lactate (Lactic Acid) 5.3 (*)     All other components within normal limits    Narrative:       Lactic Acid critical result(s) called and verbal readback obtained   from Lalitha Patel., 10/14/2019 17:18   URINALYSIS, REFLEX TO URINE CULTURE - Abnormal; Notable for the following components:    Glucose, UA 1+ (*)     Occult Blood UA 1+ (*)     All other components within normal limits    Narrative:     Preferred Collection Type->Urine, Clean Catch   CULTURE, BLOOD   CULTURE, BLOOD   TROPONIN I   B-TYPE NATRIURETIC PEPTIDE   URINALYSIS MICROSCOPIC    Narrative:     Preferred Collection Type->Urine, Clean Catch   LACTIC ACID, PLASMA     EKG Readings: (Independently Interpreted)   This patient is in a sinus tachycardia with a heart rate of 123.  The p.r. QRS and QT intervals are normal.  There is poor R-wave progression across precordium there is ST segment depression in V2.  There are nonspecific T-wave changes.  There is a borderline intraventricular conduction delay.  There is a first-degree AV block.  There is left axis deviation.  This patient may have left ventricular hypertrophy.       Imaging Results          X-Ray Chest AP Portable (Final result)  Result time 10/14/19 17:26:24    Final result by Deepak Estrella MD (10/14/19 17:26:24)                 Impression:      No detrimental change or radiographic acute intrathoracic process seen on this single view.  Specifically, no focal consolidation.      Electronically signed by: Deepak Estrella  MD  Date:    10/14/2019  Time:    17:26             Narrative:    EXAMINATION:  XR CHEST AP PORTABLE    CLINICAL HISTORY:  Sepsis;    TECHNIQUE:  Single frontal view of the chest was performed.    COMPARISON:  Chest radiograph 11/18/2015    FINDINGS:  Monitoring leads overlie the chest.  Chronic mild nonspecific elevation of the right hemidiaphragm.  Cardiomediastinal silhouette is midline and similar to prior without evidence of failure.  Hilar regions are unchanged noting surgical clips on the right.  Few scattered linear opacities consistent with subsegmental scarring versus atelectasis.  No large consolidation or new focal opacity.  No large pleural effusion or pneumothorax.  No acute osseous process seen.  PA and lateral views can be obtained.                                 Medical Decision Making:   ED Management:  Time: 1639. Nursing staff informed me that the patient is also having CP.    Given the above, this patient presents to the emergency room with a tremor sinus tachycardia she is warm to touch.  She was found to be febrile.  The patient was treated with IV fluids and antibiotics.  Her lactate was found to be 5.3.  I presume that this is sepsis.  Patient was treated with the flu shot today.  I believe this is likely not the cause of the patient's presentation.  I will admit for IV antibiotics fluids and it trending cardiac markers.  The patient had an episode of chest pain. Initial evaluation is not remarkable for urinary tract infection pneumonia myocardial infarction.          Scribe Attestation:   Scribe #1: I performed the above scribed service and the documentation accurately describes the services I performed. I attest to the accuracy of the note.               Clinical Impression:       ICD-10-CM ICD-9-CM   1. Lactic acidosis E87.2 276.2   2. Tachycardia R00.0 785.0   3. Fever, unspecified fever cause R50.9 780.60   4. Sepsis, due to unspecified organism, unspecified whether acute organ  dysfunction present A41.9 038.9     995.91   5. Chest pain, unspecified type R07.9 786.50                           I personally performed the services described in this documentation.  All medical record  entries made by the scribe are at my direction and in my presence.  Signed, Dr. Rashmi Caraballo MD  10/17/19 0744     Female

## 2020-01-10 NOTE — H&P PST ADULT - NSICDXPASTMEDICALHX_GEN_ALL_CORE_FT
PAST MEDICAL HISTORY:  CAD (coronary artery disease)     HTN (hypertension)     Hypercholesterolemia

## 2020-01-10 NOTE — H&P PST ADULT - NSANTHOSAYNRD_GEN_A_CORE
Patient:   KIANA KWAN            MRN: [id number]            FIN: [id number]               Age:   80 years     Sex:  FEMALE     :  39   Associated Diagnoses:   None   Author:   TAYE, DINORA        Discharge Summary - Internal Medicine    Discharge Diagnosis: [Dehydration  Urinary tract infection  Sepsis  Hypertension  Hypokalemia  Hypomagnesemia  Status post fall  Left carotid artery stenosis]     Chief Complaint: [80-year-old white lady who fell approximately 1 month prior to admission has been essentially bedridden since then with very poor oral intake and finally presented to the emergency room complaining of severe weakness.]    Results Review   General Results        Labs between:  26-OCT-2018 12:29 to 27-OCT-2018 12:29    CBC:                 WBC  HgB  Hct  Plt  MCV  RDW   27-OCT-2018 5.8  (L) 10.3  (L) 32.3  231  92.3  13.0     BMP:                 Na  Cl  BUN  Glu   27-OCT-2018 136  105  8  (H) 102                              K  CO2  Cr  Ca                              3.7  22  0.62  (L) 8.2                         Physical Examination   VS/Measurements     Vitals between:   26-OCT-2018 12:29:50   TO   27-OCT-2018 12:29:50                   LAST RESULT MINIMUM MAXIMUM  Temperature 36.7 36.4 37  Heart Rate 67 67 76  Respiratory Rate 18 16 18  NISBP           165 125 169  NIDBP           65 58 66  SpO2                    96 96 98   ,    Dosing Weight:   42.8 kg    10/24/2018 18:21  Most Recent Clinical Weight:   42.8  kg    10/24/2018 18:21        Hospital Course: [Patient was found to be volume depleted hypokalemic and hypomagnesemic septic with a UTI growing Pseudomonas.  She was treated with IV fluids electrolyte replacement and intravenous Zosyn.  Her condition improved.  She will be discharged to [facility] to continue Zosyn and receive physical therapy]    Condition on Discharge: [Stable]    Recommendations: [Follow-up at [facility]]    Diet Recommended: [Cardiac]    Activity: [Up with  assist]    Discharge Medications:      DISCHARGE MEDICATION LIST   Allergies:      There are unreconciled medications on this patient. Discharge medication reconciliation must be completed   before the Discharge medication list will print for this patient. Unreconciled medications listed below:     Order Id  ,  Order Name  ,  Medication Type   [id number]  Sodium Chloride 0.9% 1,000 mL  Inpatient Order   [id number]  Acetaminophen 325 mg tab  Inpatient Order   [id number]  HYDROcodone-acetaminophen 5-325 mg tab  Inpatient Order   [id number]  AmLODIPine 5 mg tab  Inpatient Order   [id number]  Aspirin 81 mg DR tab  Inpatient Order   [id number]  Gabapentin 100 mg cap  Inpatient Order   [id number]  HydrALAZINE 20 mg/1 mL inj SDV  Inpatient Order   [id number]  Lisinopril 10 mg tab  Inpatient Order   [id number]  Ondansetron 4 mg/2 mL inj SDV  Inpatient Order   [id number]  piperacillin-tazobactam  Inpatient Order   [id number]  Zolpidem 5 mg tab  Inpatient Order         Additional Notations: [ ]            Electronically Signed On 10/27/2018 12:33  __________________________________________________   DINORA KOTHARI     No. PATRICIA screening performed.  STOP BANG Legend: 0-2 = LOW Risk; 3-4 = INTERMEDIATE Risk; 5-8 = HIGH Risk

## 2020-01-10 NOTE — H&P PST ADULT - HISTORY OF PRESENT ILLNESS
69YR OLD FEMALE STATES HAD ABNORMAL STRESS TEST -H/O CAD -NOW PRESENTS FOR [facility]. Denies c/o CP, PALP, SOB, URI, FEVER, RASH OR UTI SYMPTOMS. Exercise cassie 1-2 flat blocks LTD BY FATIGUE.

## 2020-01-10 NOTE — H&P PST ADULT - NSICDXPASTSURGICALHX_GEN_ALL_CORE_FT
PAST SURGICAL HISTORY:  AICD (automatic cardioverter/defibrillator) present     History of left heart catheterization (LHC) MULTIPLE    S/P CABG (coronary artery bypass graft) 2011 [facility]

## 2020-01-11 ENCOUNTER — OUTPATIENT (OUTPATIENT)
Dept: OUTPATIENT SERVICES | Facility: HOSPITAL | Age: 70
LOS: 1 days | Discharge: HOME | End: 2020-01-11

## 2020-01-11 ENCOUNTER — APPOINTMENT (OUTPATIENT)
Dept: INTERNAL MEDICINE | Facility: CLINIC | Age: 70
End: 2020-01-11
Payer: MEDICAID

## 2020-01-11 VITALS
HEART RATE: 60 BPM | SYSTOLIC BLOOD PRESSURE: 116 MMHG | WEIGHT: 172 LBS | DIASTOLIC BLOOD PRESSURE: 78 MMHG | BODY MASS INDEX: 31.65 KG/M2 | HEIGHT: 62 IN

## 2020-01-11 DIAGNOSIS — Z95.810 PRESENCE OF AUTOMATIC (IMPLANTABLE) CARDIAC DEFIBRILLATOR: Chronic | ICD-10-CM

## 2020-01-11 DIAGNOSIS — Z95.1 PRESENCE OF AORTOCORONARY BYPASS GRAFT: Chronic | ICD-10-CM

## 2020-01-11 DIAGNOSIS — Z98.890 OTHER SPECIFIED POSTPROCEDURAL STATES: Chronic | ICD-10-CM

## 2020-01-11 PROCEDURE — 99213 OFFICE O/P EST LOW 20 MIN: CPT | Mod: GC

## 2020-01-11 NOTE — HISTORY OF PRESENT ILLNESS
[de-identified] : 69 yo F with CAD s/p CABG, systolic CHF with AICD, HTN, HLD, BPPV, GERD and pre-DM presents for routine follow up appointment. Today she presents for medication refills.

## 2020-01-11 NOTE — PLAN
[FreeTextEntry1] : 1- CAD s/p CABG 7 years ago \par HFREF s/p AICD\par \par stable\par c/w meds- ASA, lipitor, toprol xl, ACE-, plavix, imdur, ranexa\par - sent refills\par \par 2- HTN- controlled on meds\par c/w meds\par \par 3- DLD- c/w statin\par \par 4- GERD- c/w famotidine q24\par \par 5-mild hyperthyroidism\par - TSH is 4.34 - will send for repeat and follow up in 3 months\par \par 6- HCM- UTD\par mammogram normal\par f/u with Dr. Stein in 3 months

## 2020-01-12 NOTE — HISTORY OF PRESENT ILLNESS
[FreeTextEntry1] : 69 years old female with history of CAD s/p CABG x 4 (LIMA to LAD, SVG to D2, SVG to RCA, SVG to ramous/M2 in 2011), ischemic cardiomyopathy s/p AICD, HFrEF, HTN, DLD, presented for follow up of her heart failure after getting Lexiscan. Patient was last seen 6 weeks ago with worsening SOB with exertion and Ranolazine dose was increased and a Lexiscan was ordered. Lexiscan shows the EF to be 25% and small anteroseptal reversible ischemia in addition to non reversible scar changes and global hypokinesia. Patient has been compliant with her medication. Denies fever/chills, abdominal pain, n/v/d/c. \par \par Lexiscan : EF of 25%, Global hypokinesia, small reversible anteroseptal hypokinesia and scar changes from past events\par ECHO 4/26/19: EF 40-45%, basal inferolateral akinesis, mid/apical inferior wall and mid inferolateral hypokinesis\par MUGA 8/31/16: 50%\par EKG 01/02/19: normal sinus rhythm with pathologic Q wave at II, III, aVF, 1st degree AV block, LVH\par Device interrogation 01/02/20: normal device function

## 2020-01-12 NOTE — REVIEW OF SYSTEMS
[Eyeglasses] : currently wearing eyeglasses [Shortness Of Breath] : shortness of breath [Dyspnea on exertion] : dyspnea during exertion [Chest Pain] : chest pain [Fever] : no fever [Headache] : no headache [Chills] : no chills [Blurry Vision] : no blurred vision [Seeing Double (Diplopia)] : no diplopia [Earache] : no earache [Loss Of Hearing] : no hearing loss [Mouth Sores] : no mouth sores [Sinus Pressure] : no sinus pressure [Chest  Pressure] : no chest pressure [Lower Ext Edema] : no extremity edema [Palpitations] : no palpitations [Cough] : no cough [Wheezing] : no wheezing [Abdominal Pain] : no abdominal pain [Nausea] : no nausea [Vomiting] : no vomiting [Joint Pain] : no joint pain [Joint Swelling] : no joint swelling [Skin: A Rash] : no rash: [Dizziness] : no dizziness [Confusion] : no confusion was observed

## 2020-01-12 NOTE — END OF VISIT
[] : Resident [FreeTextEntry3] : Seen / examined and above reviewed.\par \par CAD s/p CABG, ICM s/p AICD, chronic systolic CHF.\par ECHO (2018): EF 40-45%. PN filling. Mild - Mod MR.\par MUGA (2016): EF 50%.\par \par Angina stable.  No improvement on increased Ranexa.\par Dyspnea is more dominate symptom and worse since last visit.  Significant decrease in functional capacity.\par \par NST (12/17/19):  Large inferior / lateral / inferolateral infarct.  Small inferoseptal reversible defect.  EF 25%.\par \par Decreased LVSF.  Possible silent MI.\par \par BP controlled.\par Clinically compensated / euvolemic.\par \par - Cont ASA, Plavix, Lipitor.\par - Cont Toprol, Losartan, Chlorthalidone.\par - Cont Imdur and Ranexa.\par - Cath.\par - Follow-up 2-weeks post cath.

## 2020-01-12 NOTE — ASSESSMENT
[FreeTextEntry1] : 69 years old female with history of CAD s/p CABG x 4 (LIMA to LAD, SVG to D2, SVG to RCA, SVG to ramous/M2 in 2011), ischemic cardiomyopathy s/p AICD, HFrEF, HTN, DLD, presented for follow up of her heart failure after getting Lexiscan.\par \par Patient stats that she has reduced her activity in the past weeks due to chest pains and is unable to do any small household tasks without getting chest pains and SOB. Patient wakes up several days in week SOB at night as well and is unable to walk briskly or climb any stairs at all.\par \par Patient likely had a cardiac event in between April and December between the 2 scans leading to fall in EF. Will repeat Echo to compare to April one and plan for catheterization.\par \par # HFrEF\par - LEXISCAN stress test shows EF of 25%\par - ECHO 4/26/19: EF 40-45%, basal inferolateral akinesis, mid/apical inferior wall and mid inferolateral hypokinesia \par - MUGA- 50% in 2016\par - EKG 11/5/19: normal sinus rhythm with pathologic Q wave at II, III, aVF, 1st degree AV block, LVH\par \par # CAD s/p PCI\par - Reports intermittent chest pain and shortness of breath, and worsening breathing status\par - Continue ASA, plavix, BB,Statin and ACE\par \par # Unstable Angina\par - On Ranexa and Nitro\par - Increased Ranexa to 1000mg q12 last visit\par - Repeat Echo\par - Plan for Catheterization\par \par # Hyperkalemia -resolved\par \par - Follow up post catheterization\par

## 2020-01-13 PROBLEM — I25.10 ATHEROSCLEROTIC HEART DISEASE OF NATIVE CORONARY ARTERY WITHOUT ANGINA PECTORIS: Chronic | Status: ACTIVE | Noted: 2020-01-10

## 2020-01-13 PROBLEM — I10 ESSENTIAL (PRIMARY) HYPERTENSION: Chronic | Status: ACTIVE | Noted: 2020-01-10

## 2020-01-13 PROBLEM — E78.00 PURE HYPERCHOLESTEROLEMIA, UNSPECIFIED: Chronic | Status: ACTIVE | Noted: 2020-01-10

## 2020-01-15 DIAGNOSIS — R79.89 OTHER SPECIFIED ABNORMAL FINDINGS OF BLOOD CHEMISTRY: ICD-10-CM

## 2020-01-15 DIAGNOSIS — I25.10 ATHEROSCLEROTIC HEART DISEASE OF NATIVE CORONARY ARTERY WITHOUT ANGINA PECTORIS: ICD-10-CM

## 2020-01-15 DIAGNOSIS — I50.22 CHRONIC SYSTOLIC (CONGESTIVE) HEART FAILURE: ICD-10-CM

## 2020-01-15 DIAGNOSIS — I10 ESSENTIAL (PRIMARY) HYPERTENSION: ICD-10-CM

## 2020-01-15 DIAGNOSIS — E78.5 HYPERLIPIDEMIA, UNSPECIFIED: ICD-10-CM

## 2020-01-16 ENCOUNTER — OUTPATIENT (OUTPATIENT)
Dept: OUTPATIENT SERVICES | Facility: HOSPITAL | Age: 70
LOS: 1 days | Discharge: HOME | End: 2020-01-16
Payer: MEDICAID

## 2020-01-16 DIAGNOSIS — Z95.810 PRESENCE OF AUTOMATIC (IMPLANTABLE) CARDIAC DEFIBRILLATOR: Chronic | ICD-10-CM

## 2020-01-16 DIAGNOSIS — R94.39 ABNORMAL RESULT OF OTHER CARDIOVASCULAR FUNCTION STUDY: ICD-10-CM

## 2020-01-16 DIAGNOSIS — E78.00 PURE HYPERCHOLESTEROLEMIA, UNSPECIFIED: ICD-10-CM

## 2020-01-16 DIAGNOSIS — I10 ESSENTIAL (PRIMARY) HYPERTENSION: ICD-10-CM

## 2020-01-16 DIAGNOSIS — Z95.1 PRESENCE OF AORTOCORONARY BYPASS GRAFT: Chronic | ICD-10-CM

## 2020-01-16 DIAGNOSIS — Z95.810 PRESENCE OF AUTOMATIC (IMPLANTABLE) CARDIAC DEFIBRILLATOR: ICD-10-CM

## 2020-01-16 DIAGNOSIS — Z98.890 OTHER SPECIFIED POSTPROCEDURAL STATES: Chronic | ICD-10-CM

## 2020-01-16 DIAGNOSIS — I25.10 ATHEROSCLEROTIC HEART DISEASE OF NATIVE CORONARY ARTERY WITHOUT ANGINA PECTORIS: ICD-10-CM

## 2020-01-16 DIAGNOSIS — Z79.02 LONG TERM (CURRENT) USE OF ANTITHROMBOTICS/ANTIPLATELETS: ICD-10-CM

## 2020-01-16 DIAGNOSIS — Z95.1 PRESENCE OF AORTOCORONARY BYPASS GRAFT: ICD-10-CM

## 2020-01-16 DIAGNOSIS — I25.82 CHRONIC TOTAL OCCLUSION OF CORONARY ARTERY: ICD-10-CM

## 2020-01-16 LAB
ANION GAP SERPL CALC-SCNC: 12 MMOL/L — SIGNIFICANT CHANGE UP (ref 7–14)
BUN SERPL-MCNC: 10 MG/DL — SIGNIFICANT CHANGE UP (ref 10–20)
CALCIUM SERPL-MCNC: 10 MG/DL — SIGNIFICANT CHANGE UP (ref 8.5–10.1)
CHLORIDE SERPL-SCNC: 98 MMOL/L — SIGNIFICANT CHANGE UP (ref 98–110)
CO2 SERPL-SCNC: 30 MMOL/L — SIGNIFICANT CHANGE UP (ref 17–32)
CREAT SERPL-MCNC: 0.6 MG/DL — LOW (ref 0.7–1.5)
GLUCOSE SERPL-MCNC: 102 MG/DL — HIGH (ref 70–99)
HCT VFR BLD CALC: 38.1 % — SIGNIFICANT CHANGE UP (ref 37–47)
HGB BLD-MCNC: 13.3 G/DL — SIGNIFICANT CHANGE UP (ref 12–16)
MCHC RBC-ENTMCNC: 32.5 PG — HIGH (ref 27–31)
MCHC RBC-ENTMCNC: 34.9 G/DL — SIGNIFICANT CHANGE UP (ref 32–37)
MCV RBC AUTO: 93.2 FL — SIGNIFICANT CHANGE UP (ref 81–99)
NRBC # BLD: 0 /100 WBCS — SIGNIFICANT CHANGE UP (ref 0–0)
PLATELET # BLD AUTO: 263 K/UL — SIGNIFICANT CHANGE UP (ref 130–400)
POTASSIUM SERPL-MCNC: 4.3 MMOL/L — SIGNIFICANT CHANGE UP (ref 3.5–5)
POTASSIUM SERPL-SCNC: 4.3 MMOL/L — SIGNIFICANT CHANGE UP (ref 3.5–5)
RBC # BLD: 4.09 M/UL — LOW (ref 4.2–5.4)
RBC # FLD: 12.8 % — SIGNIFICANT CHANGE UP (ref 11.5–14.5)
SODIUM SERPL-SCNC: 140 MMOL/L — SIGNIFICANT CHANGE UP (ref 135–146)
WBC # BLD: 7.26 K/UL — SIGNIFICANT CHANGE UP (ref 4.8–10.8)
WBC # FLD AUTO: 7.26 K/UL — SIGNIFICANT CHANGE UP (ref 4.8–10.8)

## 2020-01-16 PROCEDURE — 93455 CORONARY ART/GRFT ANGIO S&I: CPT | Mod: 26

## 2020-01-16 NOTE — ASU PATIENT PROFILE, ADULT - PSH
AICD (automatic cardioverter/defibrillator) present    History of left heart catheterization (LHC)  MULTIPLE  S/P CABG (coronary artery bypass graft)  2011 [facility]

## 2020-01-27 ENCOUNTER — APPOINTMENT (OUTPATIENT)
Dept: CARDIOLOGY | Facility: CLINIC | Age: 70
End: 2020-01-27
Payer: MEDICAID

## 2020-01-27 ENCOUNTER — OUTPATIENT (OUTPATIENT)
Dept: OUTPATIENT SERVICES | Facility: HOSPITAL | Age: 70
LOS: 1 days | Discharge: HOME | End: 2020-01-27

## 2020-01-27 VITALS
HEART RATE: 63 BPM | DIASTOLIC BLOOD PRESSURE: 76 MMHG | WEIGHT: 172 LBS | HEIGHT: 62 IN | TEMPERATURE: 96.8 F | BODY MASS INDEX: 31.65 KG/M2 | SYSTOLIC BLOOD PRESSURE: 120 MMHG

## 2020-01-27 DIAGNOSIS — Z95.1 PRESENCE OF AORTOCORONARY BYPASS GRAFT: Chronic | ICD-10-CM

## 2020-01-27 DIAGNOSIS — Z98.890 OTHER SPECIFIED POSTPROCEDURAL STATES: Chronic | ICD-10-CM

## 2020-01-27 DIAGNOSIS — Z95.810 PRESENCE OF AUTOMATIC (IMPLANTABLE) CARDIAC DEFIBRILLATOR: Chronic | ICD-10-CM

## 2020-01-27 PROCEDURE — 99213 OFFICE O/P EST LOW 20 MIN: CPT

## 2020-01-27 RX ORDER — LOSARTAN POTASSIUM 100 MG/1
100 TABLET, FILM COATED ORAL DAILY
Qty: 90 | Refills: 0 | Status: DISCONTINUED | COMMUNITY
Start: 2019-11-25 | End: 2020-01-27

## 2020-01-27 NOTE — PHYSICAL EXAM
[General Appearance - Well Developed] : well developed [Normal Appearance] : normal appearance [Well Groomed] : well groomed [General Appearance - Well Nourished] : well nourished [No Deformities] : no deformities [General Appearance - In No Acute Distress] : no acute distress [Normal Oral Mucosa] : normal oral mucosa [No Oral Pallor] : no oral pallor [No Oral Cyanosis] : no oral cyanosis [Respiration, Rhythm And Depth] : normal respiratory rhythm and effort [Exaggerated Use Of Accessory Muscles For Inspiration] : no accessory muscle use [Auscultation Breath Sounds / Voice Sounds] : lungs were clear to auscultation bilaterally [Heart Rate And Rhythm] : heart rate and rhythm were normal [Heart Sounds] : normal S1 and S2 [Murmurs] : no murmurs present [Abdomen Soft] : soft [Abdomen Tenderness] : non-tender [Abdomen Mass (___ Cm)] : no abdominal mass palpated [Nail Clubbing] : no clubbing of the fingernails [Cyanosis, Localized] : no localized cyanosis [Petechial Hemorrhages (___cm)] : no petechial hemorrhages [Skin Color & Pigmentation] : normal skin color and pigmentation [] : no rash [No Venous Stasis] : no venous stasis [Skin Lesions] : no skin lesions [No Skin Ulcers] : no skin ulcer [No Xanthoma] : no  xanthoma was observed [Oriented To Time, Place, And Person] : oriented to person, place, and time [Affect] : the affect was normal [Mood] : the mood was normal [No Anxiety] : not feeling anxious

## 2020-01-27 NOTE — ASSESSMENT
[FreeTextEntry1] : Angina, CAD s/p CABG, ICM s/p AICD, chronic systolic CHF.\par -stable, clinically euvolemic\par -no improvement with increased Ranexa.\par -NST (12/17/19): Large inferior / lateral / inferolateral infarct. Small inferoseptal reversible defect. EF 25%.\par -ECHO (2018): EF 40-45%. Mild - Mod MR.\par -MUGA (2016): EF 50%.\par -cath showed patent grafts and diffuse CAD\par - c/w ASA, Plavix, Lipitor.\par - c/w Toprol, Chlorthalidone\par - c/w Imdur and Ranexa\par -stop losartan and start entresto 24/26 and spironolactone 12.5mg qd\par -BMP in 1 week and BNP\par -refer to cardiac rehab\par \par RTC 3 months and PRN

## 2020-01-27 NOTE — END OF VISIT
[] : Resident [FreeTextEntry3] : Seen / examined and above reviewed.\par \par CAD s/p CABG, ICM s/p AICD, chronic systolic CHF.\par \par CATH (1/2020): Diffuse disease.  Patent grafts.  No good revascularization targets.\par ECHO (1/9/20): EF 31%.  Mild MR.\par NST (12/17/19): Large inferior / lateral / inferolateral infarct. Small inferoseptal reversible defect. EF 25%.\par ECHO (2018): EF 40-45%. PN filling. Mild - Mod MR.\par MUGA (2016): EF 50%.\par \par Stable angina / exertional dyspnea.\par Weight stable.  No LE edema.\par \par BP controlled.\par Clinically compensated / euvolemic.\par Right groin without hematoma.\par - Cont ASA, Plavix, Lipitor.\par - Cont Toprol.\par - Cont Chlorthalidone.\par - Cont Imdur and Ranexa.\par - Stop Losartan.\par - Start Entresto and Spironolactone.\par - Labs 1-week.\par - Cardiac rehab.\par - Follow-up 3-months.

## 2020-01-27 NOTE — HISTORY OF PRESENT ILLNESS
[FreeTextEntry1] : 69 years old female with history of CAD s/p CABG x 4 (LIMA to LAD, SVG to D2, SVG to RCA, SVG to ramus/M2 in 2011), ischemic cardiomyopathy s/p AICD, HFrEF, HTN, DLD, presented for follow up after a coronary angiogram 3 weeks ago. Patient was last  seen for worsening SOB with exertion and Ranolazine dose was increased and a Lexiscan showed EF to be 25% and small anteroseptal reversible ischemia in addition to non reversible scar changes and global hypokinesia. Patient has been compliant with her medication. Denies fever/chills, abdominal pain, n/v/d/c. \par \par Cath(1/2020): patent grafts and diffuse CAD\par Lexiscan : EF of 25%, Global hypokinesia, small reversible anteroseptal hypokinesia and scar changes from past events\par ECHO 4/26/19: EF 40-45%, basal inferolateral akinesis, mid/apical inferior wall and mid inferolateral hypokinesis\par MUGA 8/31/16: 50%\par EKG 01/02/19: normal sinus rhythm with pathologic Q wave at II, III, aVF, 1st degree AV block, LVH\par Device interrogation 01/02/20: normal device function \par

## 2020-02-03 DIAGNOSIS — I25.10 ATHEROSCLEROTIC HEART DISEASE OF NATIVE CORONARY ARTERY WITHOUT ANGINA PECTORIS: ICD-10-CM

## 2020-02-03 DIAGNOSIS — I25.5 ISCHEMIC CARDIOMYOPATHY: ICD-10-CM

## 2020-02-03 DIAGNOSIS — I50.22 CHRONIC SYSTOLIC (CONGESTIVE) HEART FAILURE: ICD-10-CM

## 2020-03-01 ENCOUNTER — OUTPATIENT (OUTPATIENT)
Dept: OUTPATIENT SERVICES | Facility: HOSPITAL | Age: 70
LOS: 1 days | End: 2020-03-01
Payer: MEDICAID

## 2020-03-01 DIAGNOSIS — Z95.1 PRESENCE OF AORTOCORONARY BYPASS GRAFT: Chronic | ICD-10-CM

## 2020-03-01 DIAGNOSIS — Z98.890 OTHER SPECIFIED POSTPROCEDURAL STATES: Chronic | ICD-10-CM

## 2020-03-01 DIAGNOSIS — Z95.810 PRESENCE OF AUTOMATIC (IMPLANTABLE) CARDIAC DEFIBRILLATOR: Chronic | ICD-10-CM

## 2020-03-01 PROCEDURE — G9001: CPT

## 2020-03-06 ENCOUNTER — RX RENEWAL (OUTPATIENT)
Age: 70
End: 2020-03-06

## 2020-03-20 ENCOUNTER — EMERGENCY (EMERGENCY)
Facility: HOSPITAL | Age: 70
LOS: 0 days | Discharge: HOME | End: 2020-03-20
Attending: EMERGENCY MEDICINE | Admitting: EMERGENCY MEDICINE
Payer: MEDICAID

## 2020-03-20 VITALS
OXYGEN SATURATION: 100 % | TEMPERATURE: 99 F | DIASTOLIC BLOOD PRESSURE: 87 MMHG | HEART RATE: 65 BPM | RESPIRATION RATE: 22 BRPM | SYSTOLIC BLOOD PRESSURE: 138 MMHG

## 2020-03-20 DIAGNOSIS — Z98.890 OTHER SPECIFIED POSTPROCEDURAL STATES: Chronic | ICD-10-CM

## 2020-03-20 DIAGNOSIS — I10 ESSENTIAL (PRIMARY) HYPERTENSION: ICD-10-CM

## 2020-03-20 DIAGNOSIS — R61 GENERALIZED HYPERHIDROSIS: ICD-10-CM

## 2020-03-20 DIAGNOSIS — E78.5 HYPERLIPIDEMIA, UNSPECIFIED: ICD-10-CM

## 2020-03-20 DIAGNOSIS — I25.119 ATHEROSCLEROTIC HEART DISEASE OF NATIVE CORONARY ARTERY WITH UNSPECIFIED ANGINA PECTORIS: ICD-10-CM

## 2020-03-20 DIAGNOSIS — Z95.1 PRESENCE OF AORTOCORONARY BYPASS GRAFT: Chronic | ICD-10-CM

## 2020-03-20 DIAGNOSIS — Z95.1 PRESENCE OF AORTOCORONARY BYPASS GRAFT: ICD-10-CM

## 2020-03-20 DIAGNOSIS — Z95.810 PRESENCE OF AUTOMATIC (IMPLANTABLE) CARDIAC DEFIBRILLATOR: Chronic | ICD-10-CM

## 2020-03-20 DIAGNOSIS — R07.89 OTHER CHEST PAIN: ICD-10-CM

## 2020-03-20 LAB
ALBUMIN SERPL ELPH-MCNC: 4.7 G/DL — SIGNIFICANT CHANGE UP (ref 3.5–5.2)
ALP SERPL-CCNC: 67 U/L — SIGNIFICANT CHANGE UP (ref 30–115)
ALT FLD-CCNC: 19 U/L — SIGNIFICANT CHANGE UP (ref 0–41)
ANION GAP SERPL CALC-SCNC: 13 MMOL/L — SIGNIFICANT CHANGE UP (ref 7–14)
AST SERPL-CCNC: 21 U/L — SIGNIFICANT CHANGE UP (ref 0–41)
BILIRUB SERPL-MCNC: 0.3 MG/DL — SIGNIFICANT CHANGE UP (ref 0.2–1.2)
BUN SERPL-MCNC: 14 MG/DL — SIGNIFICANT CHANGE UP (ref 10–20)
CALCIUM SERPL-MCNC: 9.8 MG/DL — SIGNIFICANT CHANGE UP (ref 8.5–10.1)
CHLORIDE SERPL-SCNC: 95 MMOL/L — LOW (ref 98–110)
CO2 SERPL-SCNC: 26 MMOL/L — SIGNIFICANT CHANGE UP (ref 17–32)
CREAT SERPL-MCNC: 0.8 MG/DL — SIGNIFICANT CHANGE UP (ref 0.7–1.5)
GLUCOSE SERPL-MCNC: 113 MG/DL — HIGH (ref 70–99)
HCT VFR BLD CALC: 38.3 % — SIGNIFICANT CHANGE UP (ref 37–47)
HGB BLD-MCNC: 13.5 G/DL — SIGNIFICANT CHANGE UP (ref 12–16)
MCHC RBC-ENTMCNC: 32.3 PG — HIGH (ref 27–31)
MCHC RBC-ENTMCNC: 35.2 G/DL — SIGNIFICANT CHANGE UP (ref 32–37)
MCV RBC AUTO: 91.6 FL — SIGNIFICANT CHANGE UP (ref 81–99)
NRBC # BLD: 0 /100 WBCS — SIGNIFICANT CHANGE UP (ref 0–0)
PLATELET # BLD AUTO: 285 K/UL — SIGNIFICANT CHANGE UP (ref 130–400)
POTASSIUM SERPL-MCNC: 4.4 MMOL/L — SIGNIFICANT CHANGE UP (ref 3.5–5)
POTASSIUM SERPL-SCNC: 4.4 MMOL/L — SIGNIFICANT CHANGE UP (ref 3.5–5)
PROT SERPL-MCNC: 7.2 G/DL — SIGNIFICANT CHANGE UP (ref 6–8)
RBC # BLD: 4.18 M/UL — LOW (ref 4.2–5.4)
RBC # FLD: 12.9 % — SIGNIFICANT CHANGE UP (ref 11.5–14.5)
SODIUM SERPL-SCNC: 134 MMOL/L — LOW (ref 135–146)
TROPONIN T SERPL-MCNC: <0.01 NG/ML — SIGNIFICANT CHANGE UP
TROPONIN T SERPL-MCNC: <0.01 NG/ML — SIGNIFICANT CHANGE UP
WBC # BLD: 7.38 K/UL — SIGNIFICANT CHANGE UP (ref 4.8–10.8)
WBC # FLD AUTO: 7.38 K/UL — SIGNIFICANT CHANGE UP (ref 4.8–10.8)

## 2020-03-20 PROCEDURE — 93010 ELECTROCARDIOGRAM REPORT: CPT

## 2020-03-20 PROCEDURE — 71045 X-RAY EXAM CHEST 1 VIEW: CPT | Mod: 26

## 2020-03-20 PROCEDURE — 99218: CPT

## 2020-03-20 PROCEDURE — 99222 1ST HOSP IP/OBS MODERATE 55: CPT

## 2020-03-20 RX ORDER — ASPIRIN/CALCIUM CARB/MAGNESIUM 324 MG
162 TABLET ORAL ONCE
Refills: 0 | Status: COMPLETED | OUTPATIENT
Start: 2020-03-20 | End: 2020-03-20

## 2020-03-20 RX ORDER — ISOSORBIDE MONONITRATE 60 MG/1
3 TABLET, EXTENDED RELEASE ORAL
Qty: 90 | Refills: 0
Start: 2020-03-20 | End: 2020-04-18

## 2020-03-20 RX ORDER — ISOSORBIDE MONONITRATE 60 MG/1
90 TABLET, EXTENDED RELEASE ORAL DAILY
Refills: 0 | Status: DISCONTINUED | OUTPATIENT
Start: 2020-03-20 | End: 2020-03-20

## 2020-03-20 RX ORDER — CLOPIDOGREL BISULFATE 75 MG/1
75 TABLET, FILM COATED ORAL DAILY
Refills: 0 | Status: DISCONTINUED | OUTPATIENT
Start: 2020-03-20 | End: 2020-03-20

## 2020-03-20 RX ADMIN — Medication 162 MILLIGRAM(S): at 12:41

## 2020-03-20 NOTE — ED CDU PROVIDER DISPOSITION NOTE - PATIENT PORTAL LINK FT
You can access the FollowMyHealth Patient Portal offered by [facility] by registering at the following website: [email or web address]. By joining [employer]’s FollowMyHealth portal, you will also be able to view your health information using other applications (apps) compatible with our system.

## 2020-03-20 NOTE — ED CDU PROVIDER DISPOSITION NOTE - CARE PROVIDER_API CALL
Villa Bolton)  Cardiovascular Disease; Internal Medicine  [address] [place] [address]  Phone: [phone]  Fax: [phone]  Follow Up Time:

## 2020-03-20 NOTE — ED CDU PROVIDER INITIAL DAY NOTE - ATTENDING CONTRIBUTION TO CARE
68yo woman h/o HLD, HTN, CAD s/p CABG in 1/2020 was placed in CDU for chest pain after an initial EKG and trop were unremarkable. Pt was seen by the cardiology fellow and by Dr Bolton; they adjusted her medications (increased Imdur), plan is for serial EKG and enzymes and likely discharge.

## 2020-03-20 NOTE — ED CDU PROVIDER INITIAL DAY NOTE - PROGRESS NOTE DETAILS
pt seen bedside, NAD, no chest pain. Negative cardiac enzymes x2 and nl ekg. spoke with patients cardiologist Dr. Bolton pt was ssen bedside by cardio team and agree to dispo patient home and follow up out patient with Dr. Bolton and increase imudur to 90 daily. will dispo pt home.

## 2020-03-20 NOTE — ED PROVIDER NOTE - PHYSICAL EXAMINATION
VITAL SIGNS: I have reviewed nursing notes and confirm.  CONSTITUTIONAL: Pt non-toxic, well appearing.  SKIN: No rash.  EYES: Pink conjunctiva, anicteric.   ENT: MMM, no exudates.  NECK: Supple, no crepitus.  CARD: RRR, no murmur.  RESP: CTAB.  ABD: Soft, NTND.  EXT: No calf tenderness or edema. 2+ pulses b/l.   NEURO: No focal neuro deficits.

## 2020-03-20 NOTE — ED CDU PROVIDER INITIAL DAY NOTE - OBJECTIVE STATEMENT
pt is a 69y female with pmhx of HTN, HLD< CAD s/p CABG done by Dr. Bolton on 1/16/20  comes in for CP that started this AM left sided radiating to her back. pt states she was also diaphoretic and it fel similar in nature to her previous heart problems. pt took nitroglycerin and had relief of FP. pt placed in obs to obtain another set of cardiac enzymes. pt denies any nausea, vomiting, cough, sob, calf pain, abdominal pain, head ache, visual changes, loc.

## 2020-03-20 NOTE — ED PROVIDER NOTE - PROGRESS NOTE DETAILS
BK: Discussed with cardiac fellow, will eval; pending labs. Authored by Dr. Arias: seen by card fellow. agree with mgt. obs for 2nd trop and f/u card recs

## 2020-03-20 NOTE — ED CDU PROVIDER DISPOSITION NOTE - NSFOLLOWUPINSTRUCTIONS_ED_ALL_ED_FT
follow up pmd and Dr. Bolton out patient       increase imdur to (90  )     Chest Pain    Chest pain can be caused by many different conditions which may or may not be dangerous. Causes include heartburn, lung infections, heart attack, blood clot in lungs, skin infections, strain or damage to muscle, cartilage, or bones, etc. In addition to a history and physical examination, an electrocardiogram (ECG) or other lab tests may have been performed to determine the cause of your chest pain. Follow up with your primary care provider or with a cardiologist as instructed.     SEEK IMMEDIATE MEDICAL CARE IF YOU HAVE ANY OF THE FOLLOWING SYMPTOMS: worsening chest pain, coughing up blood, unexplained back/neck/jaw pain, severe abdominal pain, dizziness or lightheadedness, fainting, shortness of breath, sweaty or clammy skin, vomiting, or racing heart beat. These symptoms may represent a serious problem that is an emergency. Do not wait to see if the symptoms will go away. Get medical help right away. Call 911 and do not drive yourself to the hospital.

## 2020-03-20 NOTE — CONSULT NOTE ADULT - ASSESSMENT
Assessment:  Anginal chest pain r/o acute ACS  CAD s/p CABG, PCI+stent to RCA   HFrEF s/p AICD  HTN, stable    Plan:  maximize medical therapy, increase imdur to 90 mg daily  c/w aspirin, plavix, toprol xl, statin  c/w medical therapy for heart failure  trend cardiac enzymes  monitor for 24 hours in observation unit Assessment:  Anginal chest pain r/o acute ACS  CAD s/p CABG, PCI+stent to RCA   HFrEF s/p AICD  HTN, stable    Plan:  maximize medical therapy, increase imdur to 90 mg daily  c/w aspirin, plavix, toprol xl, statin  c/w medical therapy for heart failure  trend 2nd set of cardiac enzyme  if negative 2nd set of cardiac enzymes, ambulate patient  if no recurrence of symptoms with ambulation, discharge home for outpatient follow up with increase in imdur medical therapy

## 2020-03-20 NOTE — ED CDU PROVIDER DISPOSITION NOTE - CLINICAL COURSE
70yo woman h/o HLD, HTN, CAD s/p CABG in 1/2020 was placed in CDU for chest pain after an initial EKG and trop were unremarkable. Pt was seen by the cardiology fellow and by Dr Bolton; they adjusted her medications (increased Imdur) and cleared her for d/c after second set of enzymes. Pt was monitored without incident, repeat EKG and enzymes unchanged; pt d/c home to f/u with Dr Bolton.

## 2020-03-20 NOTE — ED PROVIDER NOTE - OBJECTIVE STATEMENT
70 y/o F with PMH of HTN, HLD, and CAD s/p CABG, last cath done by Dr. Bolton on 1/16/20 with significant disease now presents for evaluation of CP since this AM. CP had gradual onset lasting for 10 minutes, radiating to back, and relieved with Nitroglycerin. No f/c, cough, SOB, n/v, diaphoresis or recent travel.

## 2020-03-20 NOTE — CONSULT NOTE ADULT - ATTENDING COMMENTS
Seen / examined prior to discharge.  Above reviewed.    CAD s/p CABG s/p PCI.  ICM s/p AICD  Chronic Systolic CHF    Recent cath.  No good targets for revascularization.    Presented with anginal episode.  No recurrent symptoms.  Hemodynamics stable.  Euvolemic.  EKG: NSR, LVH  Initial troponin negative.    RECOMMEND:  - Cont home Rx.  - Increase Imdur 90mg q24.  - Follow troponin.  - If remains stable, rules-out MI, ambulates without angina, plan for discharge with outpatient follow-up.    Discussed with Dr. Laurent.

## 2020-03-20 NOTE — ED PROVIDER NOTE - NS ED ROS FT
Constitutional: See HPI. No fever/chills.  Eyes: No visual changes, eye pain or discharge.  ENT: No hearing changes, pain, discharge or infections.  Neck: No neck pain or stiffness.  Cardiac: (+) CP. No SOB or edema. No chest pain with exertion.  Respiratory: No cough or respiratory distress. No hemoptysis.   GI: No nausea, vomiting, diarrhea or abdominal pain.  : No dysuria, frequency or burning.   MS: No myalgia, muscle weakness, joint pain or back pain.  Neuro: No headache or weakness. No LOC.  Skin: No rash.  Endocrine: No history of thyroid disease or diabetes.  Except as documented in the HPI, all other systems are negative.

## 2020-03-20 NOTE — CONSULT NOTE ADULT - SUBJECTIVE AND OBJECTIVE BOX
Date of Admission: 03-20-20    CHIEF COMPLAINT: Patient is a 69y old  Female who presents with a chief complaint of chest pain    HPI: 70 y/o F with PMH of CAD s/p CABG 2011 in [facility], PCI+stentx1 to RCA in 2012 in [facility], HFrEF s/p AICD [facility] in 2016, recently has worsening symptoms and EF on echo found to have positive NM stress test, recent cath in Jan showed 2VD, patent LIMA to LAD, patent SVG to D1 with known occluded SVG to RCA and sequential SVG to Ramus/OM1, opted for medical therapy presented with episode of chest pain. Pt states that she did her morning exercise and went to sit on couch to have coffee. Afterwards, pt started developing substernal chest pressure 9/10 radiating to left shoulder then right shoulder lasted 15 mins associated with diaphoresis until EMS arrival. Pt took nitroglycerin which relieved the symptoms. Pt has similar episode past week and has been taking more nitroglycerin when symptoms arise.      PAST MEDICAL & SURGICAL HISTORY:  HTN (hypertension)  Hypercholesterolemia  CAD (coronary artery disease)  AICD (automatic cardioverter/defibrillator) present  History of left heart catheterization (LHC): MULTIPLE  S/P CABG (coronary artery bypass graft): 2011 [facility]      FAMILY HISTORY:  [x] MI and CVA in father     SOCIAL HISTORY:    [x] Non-smoker  [x] No alcohol use  [x] No illicit drug use    Allergies: No Known Allergies      REVIEW OF SYSTEMS:  CONSTITUTIONAL: No fever, weight loss, or fatigue  CARDIOLOGY: (+) Chest pain, dyspnea of exertion. No palpitations or syncopal episodes.   RESPIRATORY: No shortness of breath. (+) Chronic non-productive cough. No wheezing.   NEUROLOGICAL: No weakness, no focal deficits to report.  GI: No BRBPR, no N,V,diarrhea.    PSYCHIATRY: Normal mood and affect.  HEENT: No nasal discharge, no ecchymosis  SKIN: No ecchymosis, no breakdown  MUSCULOSKELETAL: Full range of motion x4.   EXTREM: No leg swelling or erythema.    PHYSICAL EXAM:  T(C): 37 (03-20-20 @ 10:29), Max: 37 (03-20-20 @ 10:29)  HR: 65 (03-20-20 @ 10:29) (65 - 65)  BP: 138/87 (03-20-20 @ 10:29) (138/87 - 138/87)  RR: 22 (03-20-20 @ 10:29) (22 - 22)  SpO2: 100% (03-20-20 @ 10:29) (100% - 100%)  Wt(kg): --  I&O's Summary      General Appearance: Well appearing, obese, NAD, normal for age and gender.  Neck: Normal JVP, no bruit.   Eyes: No xanthomalasia, Extra Ocular muscles intact.   Cardiovascular: Regular rate and rhythm S1 S2, No JVD, No murmurs.  Respiratory: Lungs clear to auscultation. No wheezes, rales or rhonchi.  Psychiatry: Alert and oriented x 3, Mood & affect appropriate  Gastrointestinal:  Soft, Non-tender  Skin/Integumen: No rashes, No ecchymoses, No cyanosis	  Neurologic: Non-focal deficits.  Musculoskeletal/ extremities: Normal range of motion, No clubbing, cyanosis or edema  Vascular: Peripheral pulses palpable 2+ bilaterally    LABS:	 	                        13.5   7.38  )-----------( 285      ( 20 Mar 2020 11:20 )             38.3     03-20    134<L>  |  95<L>  |  14  ----------------------------<  113<H>  4.4   |  26  |  0.8    Ca    9.8      20 Mar 2020 11:20    TPro  7.2  /  Alb  4.7  /  TBili  0.3  /  DBili  x   /  AST  21  /  ALT  19  /  AlkPhos  67  03-20    CARDIAC MARKERS ( 20 Mar 2020 11:20 )  x     / <0.01 ng/mL / x     / x     / x        TELEMETRY EVENTS: 	    ECG:  < from: 12 Lead ECG (01.10.20 @ 17:33) >  Sinus rhythm with 1st degree A-V block  Voltage criteria for left ventricular hypertrophy  Inferior infarct , age undetermined  Abnormal ECG  	  RADIOLOGY:  OTHER: 	    PREVIOUS DIAGNOSTIC TESTING:    [x] Echocardiogram: < from: Transthoracic Echocardiogram (01.09.20 @ 08:59) >   1. LV Ejection Fraction by Kong's Method with a biplane EF of 31 %.   2. Spectral Doppler shows impaired relaxation pattern of left ventricular myocardial filling (Grade I diastolic dysfunction).   3. Mild mitral valve regurgitation.    PHYSICIAN INTERPRETATION:  Left Ventricle: Spectral Doppler shows impaired relaxation pattern of left ventricular myocardial filling (Grade I diastolic dysfunction).  Right Ventricle: Normal right ventricular size and function.  Left Atrium: Mildly enlarged left atrium.  Right Atrium: Normal right atrial size.  Pericardium: There is no evidence of pericardial effusion.  Mitral Valve: Structurally normal mitral valve, with normal leaflet excursion. Mild mitral valve regurgitation is seen.  Tricuspid Valve: Structurally normal tricuspid valve, with normal leaflet excursion. Mild tricuspid regurgitation is visualized.  Aortic Valve: The aortic valve is trileaflet. No evidence of aortic stenosis. Trivial aortic valve regurgitation is seen.  Pulmonic Valve: Structurally normal pulmonic valve, with normal leaflet excursion.  Aorta: The aortic root is normal in size and structure.  Venous: The inferior vena cava was normal sized, with respiratory size variation less than 50%.  Additional Comments: A pacer wire is visualized in the right ventricle.    [x] Catheterization: < from: Cardiac Cath Lab - Adult (01.16.20 @ 17:17) >  CORONARY CIRCULATION: The coronary circulation is right dominant.   Left main: Mild disease.     LAD: 100% proximal to mid occlusion. Sequential 95% proximal and mid 1st Diagonal stenoses.     Patent LIMA-to-LAD with flow proximal and distal to anastomosis.     Patent SVG-to-2nd Diagonal with retrograde filling of LAD.     Severe 2nd Diagonal disease distal to anastomosis with faint retrograde filling via LAD collaterals.     Circumflex: Small circumflex vessel. 100% proximal high OM1 occlusion.   Ramus intermedius: Severe mid disease.   RCA: Patent mid stent. Diffuse mild to moderate disease. Tapering distal vessel.    IMPRESSIONS:  1. 3-vessel coronary artery disease.  2. Proximal LAD occlusion with patent LIMA-to-LAD and SVG-to-2nd Diagonal.  3. Subtotal 2nd Diagonal occlusion distal to SVG anastomosis with faint collateral filling.  4. High OM1 occlusion.  5. Known SVG-to-RCA and SVG Cilyvnsjyc-ej-Zugbg / OM2 occlusions.  6. Patent RCA stent.    < end of copied text >    [x] Stress Test:< from: NM Nuclear Stress Pharmacologic Multiple (12.17.19 @ 11:53) >  1. SMALL REVERSIBLE DEFECT IN THE ANTEROSEPTAL WALL OF THE LEFT VENTRICLE   CONSISTENT WITH ISCHEMIA.    2. LARGE FIXED DEFECT IN THE LATERAL, INFEROLATERAL AND INFERIOR WALL OF   THE LEFT VENTRICLE WHICH DOES NOT COMPLETELY THICKEN ON WALL MOTION STUDY   CONSISTENT WITH PRIOR INJURY (SCAR).    3. DILATED LEFT VENTRICLE WITH SEVERE GLOBAL HYPOKINESIS.    4. LEFT VENTRICULAR EJECTION FRACTION OF  25 % WHICH IS LOW.    ECHOCARDIOGRAPHIC ESTIMATED EJECTION FRACTION WAS 40-45% ON 04/26/2019.    < end of copied text >    	  	  Home Medications:  Aspir 81 oral delayed release tablet: 1 tab(s) orally once a day (16 Jan 2020 14:29)  atorvastatin 80 mg oral tablet: 1 tab(s) orally once a day (16 Jan 2020 14:29)  chlorthalidone 25 mg oral tablet: 1 tab(s) orally once a day (16 Jan 2020 14:29)  clopidogrel 75 mg oral tablet: 1 tab(s) orally once a day (16 Jan 2020 14:29)  famotidine 20 mg oral tablet: orally once a day (16 Jan 2020 14:29)  isosorbide mononitrate 60 mg oral tablet, extended release: 1 tab(s) orally once a day (in the morning) (16 Jan 2020 14:29)  entresto 24/26 mg po BID  spironolactone 12.5 mg po q24h  metoprolol succinate 100 mg oral tablet, extended release: 1 tab(s) orally once a day (16 Jan 2020 14:29)  Ranexa 500 mg oral tablet, extended release: 1 tab(s) orally 2 times a day (16 Jan 2020 14:29)    MEDICATIONS  (STANDING):    MEDICATIONS  (PRN): Date of Admission: 03-20-20    CHIEF COMPLAINT: Patient is a 69y old  Female who presents with a chief complaint of chest pain    HPI: 68 y/o F with PMH of CAD s/p CABG 2011 in [facility], PCI+stentx1 to RCA in 2012 in [facility], HFrEF s/p AICD [facility] in 2016, recently has worsening symptoms and EF on echo found to have positive NM stress test, recent cath in Jan showed 2VD, patent LIMA to LAD, patent SVG to D1 with known occluded SVG to RCA and sequential SVG to Ramus/OM1, opted for medical therapy presented with episode of chest pain. Pt states that she did her morning activity and went to sit on couch to have coffee. Afterwards, pt started developing substernal chest pressure 9/10 radiating to left shoulder then right shoulder lasted 15 mins associated with diaphoresis until EMS arrival. Pt took nitroglycerin which relieved the symptoms. Pt has similar episode past week and has been taking more nitroglycerin when symptoms arise.      PAST MEDICAL & SURGICAL HISTORY:  HTN (hypertension)  Hypercholesterolemia  CAD (coronary artery disease)  AICD (automatic cardioverter/defibrillator) present  History of left heart catheterization (LHC): MULTIPLE  S/P CABG (coronary artery bypass graft): 2011 [facility]      FAMILY HISTORY:  [x] MI and CVA in father     SOCIAL HISTORY:    [x] Non-smoker  [x] No alcohol use  [x] No illicit drug use    Allergies: No Known Allergies      REVIEW OF SYSTEMS:  CONSTITUTIONAL: No fever, weight loss, or fatigue  CARDIOLOGY: (+) Chest pain, dyspnea of exertion. No palpitations or syncopal episodes.   RESPIRATORY: No shortness of breath. (+) Chronic non-productive cough. No wheezing.   NEUROLOGICAL: No weakness, no focal deficits to report.  GI: No BRBPR, no N,V,diarrhea.    PSYCHIATRY: Normal mood and affect.  HEENT: No nasal discharge, no ecchymosis  SKIN: No ecchymosis, no breakdown  MUSCULOSKELETAL: Full range of motion x4.   EXTREM: No leg swelling or erythema.    PHYSICAL EXAM:  T(C): 37 (03-20-20 @ 10:29), Max: 37 (03-20-20 @ 10:29)  HR: 65 (03-20-20 @ 10:29) (65 - 65)  BP: 138/87 (03-20-20 @ 10:29) (138/87 - 138/87)  RR: 22 (03-20-20 @ 10:29) (22 - 22)  SpO2: 100% (03-20-20 @ 10:29) (100% - 100%)  Wt(kg): --  I&O's Summary      General Appearance: Well appearing, obese, NAD, normal for age and gender.  Neck: Normal JVP, no bruit.   Eyes: No xanthomalasia, Extra Ocular muscles intact.   Cardiovascular: Regular rate and rhythm S1 S2, No JVD, No murmurs.  Respiratory: Lungs clear to auscultation. No wheezes, rales or rhonchi.  Psychiatry: Alert and oriented x 3, Mood & affect appropriate  Gastrointestinal:  Soft, Non-tender  Skin/Integumen: No rashes, No ecchymoses, No cyanosis	  Neurologic: Non-focal deficits.  Musculoskeletal/ extremities: Normal range of motion, No clubbing, cyanosis or edema  Vascular: Peripheral pulses palpable 2+ bilaterally    LABS:	 	                        13.5   7.38  )-----------( 285      ( 20 Mar 2020 11:20 )             38.3     03-20    134<L>  |  95<L>  |  14  ----------------------------<  113<H>  4.4   |  26  |  0.8    Ca    9.8      20 Mar 2020 11:20    TPro  7.2  /  Alb  4.7  /  TBili  0.3  /  DBili  x   /  AST  21  /  ALT  19  /  AlkPhos  67  03-20    CARDIAC MARKERS ( 20 Mar 2020 11:20 )  x     / <0.01 ng/mL / x     / x     / x        TELEMETRY EVENTS: 	    ECG:  < from: 12 Lead ECG (01.10.20 @ 17:33) >  Sinus rhythm with 1st degree A-V block  Voltage criteria for left ventricular hypertrophy  Inferior infarct , age undetermined  Abnormal ECG  	  RADIOLOGY:  OTHER: 	    PREVIOUS DIAGNOSTIC TESTING:    [x] Echocardiogram: < from: Transthoracic Echocardiogram (01.09.20 @ 08:59) >   1. LV Ejection Fraction by Kong's Method with a biplane EF of 31 %.   2. Spectral Doppler shows impaired relaxation pattern of left ventricular myocardial filling (Grade I diastolic dysfunction).   3. Mild mitral valve regurgitation.    PHYSICIAN INTERPRETATION:  Left Ventricle: Spectral Doppler shows impaired relaxation pattern of left ventricular myocardial filling (Grade I diastolic dysfunction).  Right Ventricle: Normal right ventricular size and function.  Left Atrium: Mildly enlarged left atrium.  Right Atrium: Normal right atrial size.  Pericardium: There is no evidence of pericardial effusion.  Mitral Valve: Structurally normal mitral valve, with normal leaflet excursion. Mild mitral valve regurgitation is seen.  Tricuspid Valve: Structurally normal tricuspid valve, with normal leaflet excursion. Mild tricuspid regurgitation is visualized.  Aortic Valve: The aortic valve is trileaflet. No evidence of aortic stenosis. Trivial aortic valve regurgitation is seen.  Pulmonic Valve: Structurally normal pulmonic valve, with normal leaflet excursion.  Aorta: The aortic root is normal in size and structure.  Venous: The inferior vena cava was normal sized, with respiratory size variation less than 50%.  Additional Comments: A pacer wire is visualized in the right ventricle.    [x] Catheterization: < from: Cardiac Cath Lab - Adult (01.16.20 @ 17:17) >  CORONARY CIRCULATION: The coronary circulation is right dominant.   Left main: Mild disease.     LAD: 100% proximal to mid occlusion. Sequential 95% proximal and mid 1st Diagonal stenoses.     Patent LIMA-to-LAD with flow proximal and distal to anastomosis.     Patent SVG-to-2nd Diagonal with retrograde filling of LAD.     Severe 2nd Diagonal disease distal to anastomosis with faint retrograde filling via LAD collaterals.     Circumflex: Small circumflex vessel. 100% proximal high OM1 occlusion.   Ramus intermedius: Severe mid disease.   RCA: Patent mid stent. Diffuse mild to moderate disease. Tapering distal vessel.    IMPRESSIONS:  1. 3-vessel coronary artery disease.  2. Proximal LAD occlusion with patent LIMA-to-LAD and SVG-to-2nd Diagonal.  3. Subtotal 2nd Diagonal occlusion distal to SVG anastomosis with faint collateral filling.  4. High OM1 occlusion.  5. Known SVG-to-RCA and SVG Bklavkwtre-fk-Tdjxo / OM2 occlusions.  6. Patent RCA stent.    < end of copied text >    [x] Stress Test:< from: NM Nuclear Stress Pharmacologic Multiple (12.17.19 @ 11:53) >  1. SMALL REVERSIBLE DEFECT IN THE ANTEROSEPTAL WALL OF THE LEFT VENTRICLE   CONSISTENT WITH ISCHEMIA.    2. LARGE FIXED DEFECT IN THE LATERAL, INFEROLATERAL AND INFERIOR WALL OF   THE LEFT VENTRICLE WHICH DOES NOT COMPLETELY THICKEN ON WALL MOTION STUDY   CONSISTENT WITH PRIOR INJURY (SCAR).    3. DILATED LEFT VENTRICLE WITH SEVERE GLOBAL HYPOKINESIS.    4. LEFT VENTRICULAR EJECTION FRACTION OF  25 % WHICH IS LOW.    ECHOCARDIOGRAPHIC ESTIMATED EJECTION FRACTION WAS 40-45% ON 04/26/2019.    < end of copied text >    	  	  Home Medications:  Aspir 81 oral delayed release tablet: 1 tab(s) orally once a day (16 Jan 2020 14:29)  atorvastatin 80 mg oral tablet: 1 tab(s) orally once a day (16 Jan 2020 14:29)  chlorthalidone 25 mg oral tablet: 1 tab(s) orally once a day (16 Jan 2020 14:29)  clopidogrel 75 mg oral tablet: 1 tab(s) orally once a day (16 Jan 2020 14:29)  famotidine 20 mg oral tablet: orally once a day (16 Jan 2020 14:29)  isosorbide mononitrate 60 mg oral tablet, extended release: 1 tab(s) orally once a day (in the morning) (16 Jan 2020 14:29)  entresto 24/26 mg po BID  spironolactone 12.5 mg po q24h  metoprolol succinate 100 mg oral tablet, extended release: 1 tab(s) orally once a day (16 Jan 2020 14:29)  Ranexa 500 mg oral tablet, extended release: 1 tab(s) orally 2 times a day (16 Jan 2020 14:29)    MEDICATIONS  (STANDING):    MEDICATIONS  (PRN):

## 2020-03-24 DIAGNOSIS — Z71.89 OTHER SPECIFIED COUNSELING: ICD-10-CM

## 2020-03-31 ENCOUNTER — RX RENEWAL (OUTPATIENT)
Age: 70
End: 2020-03-31

## 2020-04-01 ENCOUNTER — RX RENEWAL (OUTPATIENT)
Age: 70
End: 2020-04-01

## 2020-04-02 ENCOUNTER — APPOINTMENT (OUTPATIENT)
Dept: CARDIOLOGY | Facility: CLINIC | Age: 70
End: 2020-04-02
Payer: MEDICAID

## 2020-04-02 PROCEDURE — 93296 REM INTERROG EVL PM/IDS: CPT

## 2020-04-02 PROCEDURE — 93295 DEV INTERROG REMOTE 1/2/MLT: CPT

## 2020-06-26 ENCOUNTER — OUTPATIENT (OUTPATIENT)
Dept: OUTPATIENT SERVICES | Facility: HOSPITAL | Age: 70
LOS: 1 days | Discharge: HOME | End: 2020-06-26

## 2020-06-26 ENCOUNTER — LABORATORY RESULT (OUTPATIENT)
Age: 70
End: 2020-06-26

## 2020-06-26 ENCOUNTER — APPOINTMENT (OUTPATIENT)
Dept: INTERNAL MEDICINE | Facility: CLINIC | Age: 70
End: 2020-06-26
Payer: MEDICAID

## 2020-06-26 VITALS
WEIGHT: 177 LBS | HEART RATE: 69 BPM | DIASTOLIC BLOOD PRESSURE: 75 MMHG | BODY MASS INDEX: 32.57 KG/M2 | HEIGHT: 62 IN | TEMPERATURE: 96.9 F | SYSTOLIC BLOOD PRESSURE: 132 MMHG

## 2020-06-26 DIAGNOSIS — Z23 ENCOUNTER FOR IMMUNIZATION: ICD-10-CM

## 2020-06-26 DIAGNOSIS — Z95.1 PRESENCE OF AORTOCORONARY BYPASS GRAFT: Chronic | ICD-10-CM

## 2020-06-26 DIAGNOSIS — Z95.810 PRESENCE OF AUTOMATIC (IMPLANTABLE) CARDIAC DEFIBRILLATOR: Chronic | ICD-10-CM

## 2020-06-26 DIAGNOSIS — Z98.890 OTHER SPECIFIED POSTPROCEDURAL STATES: Chronic | ICD-10-CM

## 2020-06-26 PROCEDURE — 99214 OFFICE O/P EST MOD 30 MIN: CPT | Mod: GC

## 2020-06-26 NOTE — PHYSICAL EXAM
[No Acute Distress] : no acute distress [Well Nourished] : well nourished [Well Developed] : well developed [Well-Appearing] : well-appearing [Normal Sclera/Conjunctiva] : normal sclera/conjunctiva [PERRL] : pupils equal round and reactive to light [EOMI] : extraocular movements intact [Normal Outer Ear/Nose] : the outer ears and nose were normal in appearance [Normal Oropharynx] : the oropharynx was normal [No JVD] : no jugular venous distention [No Lymphadenopathy] : no lymphadenopathy [Supple] : supple [Thyroid Normal, No Nodules] : the thyroid was normal and there were no nodules present [No Respiratory Distress] : no respiratory distress  [No Accessory Muscle Use] : no accessory muscle use [Normal Rate] : normal rate  [Clear to Auscultation] : lungs were clear to auscultation bilaterally [Regular Rhythm] : with a regular rhythm [Normal S1, S2] : normal S1 and S2 [No Murmur] : no murmur heard [No Carotid Bruits] : no carotid bruits [No Abdominal Bruit] : a ~M bruit was not heard ~T in the abdomen [No Varicosities] : no varicosities [Pedal Pulses Present] : the pedal pulses are present [No Edema] : there was no peripheral edema [No Palpable Aorta] : no palpable aorta [No Extremity Clubbing/Cyanosis] : no extremity clubbing/cyanosis [Soft] : abdomen soft [Non Tender] : non-tender [Non-distended] : non-distended [No Masses] : no abdominal mass palpated [No HSM] : no HSM [Normal Bowel Sounds] : normal bowel sounds [Normal Posterior Cervical Nodes] : no posterior cervical lymphadenopathy [Normal Anterior Cervical Nodes] : no anterior cervical lymphadenopathy [No CVA Tenderness] : no CVA  tenderness [No Spinal Tenderness] : no spinal tenderness [No Joint Swelling] : no joint swelling [Grossly Normal Strength/Tone] : grossly normal strength/tone [No Rash] : no rash [Coordination Grossly Intact] : coordination grossly intact [No Focal Deficits] : no focal deficits [Normal Gait] : normal gait [Deep Tendon Reflexes (DTR)] : deep tendon reflexes were 2+ and symmetric [Normal Affect] : the affect was normal [Normal Insight/Judgement] : insight and judgment were intact

## 2020-06-29 ENCOUNTER — OUTPATIENT (OUTPATIENT)
Dept: OUTPATIENT SERVICES | Facility: HOSPITAL | Age: 70
LOS: 1 days | Discharge: HOME | End: 2020-06-29
Payer: MEDICAID

## 2020-06-29 ENCOUNTER — APPOINTMENT (OUTPATIENT)
Dept: CARDIOLOGY | Facility: CLINIC | Age: 70
End: 2020-06-29
Payer: MEDICAID

## 2020-06-29 VITALS
BODY MASS INDEX: 32.39 KG/M2 | DIASTOLIC BLOOD PRESSURE: 78 MMHG | WEIGHT: 176 LBS | HEIGHT: 62 IN | SYSTOLIC BLOOD PRESSURE: 114 MMHG | HEART RATE: 72 BPM

## 2020-06-29 DIAGNOSIS — Z95.1 PRESENCE OF AORTOCORONARY BYPASS GRAFT: Chronic | ICD-10-CM

## 2020-06-29 DIAGNOSIS — Z95.810 PRESENCE OF AUTOMATIC (IMPLANTABLE) CARDIAC DEFIBRILLATOR: Chronic | ICD-10-CM

## 2020-06-29 DIAGNOSIS — Z98.890 OTHER SPECIFIED POSTPROCEDURAL STATES: Chronic | ICD-10-CM

## 2020-06-29 LAB
ANION GAP SERPL CALC-SCNC: 11 MMOL/L
BUN SERPL-MCNC: 15 MG/DL
CALCIUM SERPL-MCNC: 9.1 MG/DL
CHLORIDE SERPL-SCNC: 94 MMOL/L
CO2 SERPL-SCNC: 31 MMOL/L
CREAT SERPL-MCNC: 0.8 MG/DL
GLUCOSE SERPL-MCNC: 116 MG/DL
NT-PROBNP SERPL-MCNC: 402 PG/ML
POTASSIUM SERPL-SCNC: 3.8 MMOL/L
SODIUM SERPL-SCNC: 136 MMOL/L

## 2020-06-29 PROCEDURE — 93010 ELECTROCARDIOGRAM REPORT: CPT

## 2020-06-29 PROCEDURE — 99214 OFFICE O/P EST MOD 30 MIN: CPT

## 2020-06-29 NOTE — PHYSICAL EXAM
[Normal Appearance] : normal appearance [General Appearance - In No Acute Distress] : no acute distress [Normal Oral Mucosa] : normal oral mucosa [Normal Conjunctiva] : the conjunctiva exhibited no abnormalities [Exaggerated Use Of Accessory Muscles For Inspiration] : no accessory muscle use [Respiration, Rhythm And Depth] : normal respiratory rhythm and effort [Chest Palpation] : palpation of the chest revealed no abnormalities [Heart Rate And Rhythm] : heart rate and rhythm were normal [Edema] : no peripheral edema present [Murmurs] : no murmurs present [Abdomen Tenderness] : non-tender [Abdomen Soft] : soft [Bowel Sounds] : normal bowel sounds [Abnormal Walk] : normal gait [] : no hepato-splenomegaly [Cyanosis, Localized] : no localized cyanosis

## 2020-06-29 NOTE — REASON FOR VISIT
[Follow-Up - Clinic] : a clinic follow-up of [Coronary Artery Disease] : coronary artery disease [FreeTextEntry2] : post medical optimization

## 2020-06-29 NOTE — END OF VISIT
[] : Resident [FreeTextEntry3] : Seen / examined and above reviewed.\par \par CAD s/p CABG, ICM s/p AICD, chronic systolic CHF.\par \par CATH (1/2020): Diffuse disease. Patent grafts. No good revascularization targets.\par ECHO (1/9/20): EF 31%. Mild MR.\par NST (12/17/19): Large inferior / lateral / inferolateral infarct. Small inferoseptal reversible defect. EF 25%.\par ECHO (2018): EF 40-45%. PN filling. Mild - Mod MR.\par MUGA (2016): EF 50%.\par \par Feels well.  Symptomatic improvement with Rx changes.\par Stable exertional dyspnea.  No chest pain.\par Weight stable. No LE edema.\par No cardiac rehab secondary to Pandemic.\par \par BP controlled.\par Clinically compensated / euvolemic.\par \par EKG (6/29/20): SB 59, LVH, Inf Infarct, PRWP\par \par Labs reviewed (PMD - 6/26/20):\par Cr 0.9  K 4.6\par \par - Cont ASA, Plavix, Lipitor.\par - Cont Toprol.\par - Cont Chlorthalidone.\par - Cont Imdur and Ranexa.\par - Increase Entresto.\par - Cont Spironolactone.\par - Labs 2-weeks.\par - Cardiac rehab.\par - Follow-up 3-months.

## 2020-06-29 NOTE — REVIEW OF SYSTEMS
[Negative] : ENT [Chills] : no chills [Fever] : no fever [Shortness Of Breath] : no shortness of breath [Chest  Pressure] : no chest pressure [Dyspnea on exertion] : not dyspnea during exertion [Chest Pain] : no chest pain [Lower Ext Edema] : no extremity edema [Palpitations] : no palpitations [Cough] : no cough [Wheezing] : no wheezing [Abdominal Pain] : no abdominal pain [Nausea] : no nausea [Heartburn] : no heartburn

## 2020-06-29 NOTE — HISTORY OF PRESENT ILLNESS
[FreeTextEntry1] : 69 years old female with history of CAD s/p CABG x 4 (LIMA to LAD, SVG to D2, SVG to RCA, SVG to ramus/M2 in 2011), ischemic cardiomyopathy s/p AICD, HFrEF, HTN, DLD, presented for routine follow up.\par Pt last visit was in Jan 2020- when she was started on entresto and spironolactone. \par \par Pt had NST done in dec 2019 that showed EF 25%, small anteroseptal reversible ischemia in addition to non reversible scar changes and global hypokinesia.subsequently, pt underwent cardiac cath in Jan 2020 revealed patent grafts, diffuse CAD. \par Pt states she has been compliant with her meds. Denies any chest pain, SOB on exertion. \par Requests refill for entresto and spironolactone. \par \par \par \par Cath(1/2020): patent grafts and diffuse CAD\par Lexiscan : EF of 25%, Global hypokinesia, small reversible anteroseptal hypokinesia and scar changes from past events\par ECHO 4/26/19: EF 40-45%, basal inferolateral akinesis, mid/apical inferior wall and mid inferolateral hypokinesis\par MUGA 8/31/16: 50%\par EKG 01/02/19: normal sinus rhythm with pathologic Q wave at II, III, aVF, 1st degree AV block, LVH\par Device interrogation 01/02/20: normal device function

## 2020-06-29 NOTE — ASSESSMENT
[FreeTextEntry1] : 69 years old female with history of CAD s/p CABG x 4 (LIMA to LAD, SVG to D2, SVG to RCA, SVG to ramus/M2 in 2011), ischemic cardiomyopathy s/p AICD, HFrEF, HTN, DLD, presented for routine follow up.\par Pt last visit was in Jan 2020- when she was started on entresto and spironolactone. \par \par Pt had NST done in dec 2019 that showed EF 25%, small anteroseptal reversible ischemia in addition to non reversible scar changes and global hypokinesia.subsequently, pt underwent cardiac cath in Jan 2020 revealed patent grafts, diffuse CAD. \par Pt states she has been compliant with her meds. Denies any chest pain, SOB on exertion. \par Requests refill for entresto and spironolactone. \par \par Angina, CAD s/p CABG, ICM s/p AICD, chronic systolic CHF.\par -stable, clinically euvolemic.\par -NST (12/17/19): Large inferior / lateral / inferolateral infarct. Small inferoseptal reversible defect. EF 25%.\par -ECHO (2018): EF 40-45%. Mild - Mod MR.\par -MUGA (2016): EF 50%.\par -cath showed patent grafts and diffuse CAD Jan 2020\par - c/w ASA, Plavix, Lipitor.\par - c/w Toprol, Chlorthalidone\par - c/w Imdur and Ranexa\par -c/w entresto- increase dose to 49/51 and c/w spironolactone 12.5mg qd\par -BMP noted for K 4.9, \par - Repeat Blood in 2 weeks\par - f/u in 3 months\par \par

## 2020-06-30 DIAGNOSIS — R79.89 OTHER SPECIFIED ABNORMAL FINDINGS OF BLOOD CHEMISTRY: ICD-10-CM

## 2020-06-30 DIAGNOSIS — I25.10 ATHEROSCLEROTIC HEART DISEASE OF NATIVE CORONARY ARTERY WITHOUT ANGINA PECTORIS: ICD-10-CM

## 2020-06-30 DIAGNOSIS — I10 ESSENTIAL (PRIMARY) HYPERTENSION: ICD-10-CM

## 2020-06-30 DIAGNOSIS — I50.22 CHRONIC SYSTOLIC (CONGESTIVE) HEART FAILURE: ICD-10-CM

## 2020-06-30 DIAGNOSIS — Z23 ENCOUNTER FOR IMMUNIZATION: ICD-10-CM

## 2020-06-30 DIAGNOSIS — Z00.00 ENCOUNTER FOR GENERAL ADULT MEDICAL EXAMINATION WITHOUT ABNORMAL FINDINGS: ICD-10-CM

## 2020-06-30 DIAGNOSIS — E78.5 HYPERLIPIDEMIA, UNSPECIFIED: ICD-10-CM

## 2020-06-30 DIAGNOSIS — I25.5 ISCHEMIC CARDIOMYOPATHY: ICD-10-CM

## 2020-06-30 DIAGNOSIS — R73.9 HYPERGLYCEMIA, UNSPECIFIED: ICD-10-CM

## 2020-07-01 ENCOUNTER — RESULT REVIEW (OUTPATIENT)
Age: 70
End: 2020-07-01

## 2020-07-01 ENCOUNTER — OUTPATIENT (OUTPATIENT)
Dept: OUTPATIENT SERVICES | Facility: HOSPITAL | Age: 70
LOS: 1 days | Discharge: HOME | End: 2020-07-01
Payer: MEDICAID

## 2020-07-01 DIAGNOSIS — Z12.31 ENCOUNTER FOR SCREENING MAMMOGRAM FOR MALIGNANT NEOPLASM OF BREAST: ICD-10-CM

## 2020-07-01 DIAGNOSIS — Z95.1 PRESENCE OF AORTOCORONARY BYPASS GRAFT: Chronic | ICD-10-CM

## 2020-07-01 DIAGNOSIS — Z98.890 OTHER SPECIFIED POSTPROCEDURAL STATES: Chronic | ICD-10-CM

## 2020-07-01 DIAGNOSIS — Z95.810 PRESENCE OF AUTOMATIC (IMPLANTABLE) CARDIAC DEFIBRILLATOR: Chronic | ICD-10-CM

## 2020-07-01 PROCEDURE — 77063 BREAST TOMOSYNTHESIS BI: CPT | Mod: 26

## 2020-07-01 PROCEDURE — 77067 SCR MAMMO BI INCL CAD: CPT | Mod: 26

## 2020-07-02 ENCOUNTER — APPOINTMENT (OUTPATIENT)
Dept: CARDIOLOGY | Facility: CLINIC | Age: 70
End: 2020-07-02
Payer: MEDICAID

## 2020-07-02 DIAGNOSIS — M89.9 DISORDER OF BONE, UNSPECIFIED: ICD-10-CM

## 2020-07-02 DIAGNOSIS — Z78.0 ASYMPTOMATIC MENOPAUSAL STATE: ICD-10-CM

## 2020-07-02 DIAGNOSIS — Z13.820 ENCOUNTER FOR SCREENING FOR OSTEOPOROSIS: ICD-10-CM

## 2020-07-02 PROCEDURE — 93295 DEV INTERROG REMOTE 1/2/MLT: CPT

## 2020-07-02 PROCEDURE — 93296 REM INTERROG EVL PM/IDS: CPT

## 2020-07-06 LAB
ANION GAP SERPL CALC-SCNC: 15 MMOL/L
BUN SERPL-MCNC: 16 MG/DL
CALCIUM SERPL-MCNC: 10 MG/DL
CHLORIDE SERPL-SCNC: 96 MMOL/L
CO2 SERPL-SCNC: 26 MMOL/L
CREAT SERPL-MCNC: 0.9 MG/DL
ESTIMATED AVERAGE GLUCOSE: 117 MG/DL
GLUCOSE SERPL-MCNC: 113 MG/DL
HBA1C MFR BLD HPLC: 5.7 %
POTASSIUM SERPL-SCNC: 4.9 MMOL/L
SODIUM SERPL-SCNC: 137 MMOL/L

## 2020-07-22 NOTE — HISTORY OF PRESENT ILLNESS
[de-identified] : 69 yo F with CAD s/p CABG, systolic CHF with AICD, HTN, HLD, BPPV, GERD and pre-DM presents for routine follow up appointment. Patient has been compliant with her medication. Denies fever/chills, abdominal pain, n/v/d/c. She lives home with family, self ambulates, social negx3

## 2020-07-22 NOTE — ASSESSMENT
[FreeTextEntry1] : 67 yo F with CAD s/p CABG, systolic CHF with AICD, HTN, HLD, BPPV, GERD and pre-DM presents for routine follow up appointment. Today she presents for medication refills. \par \par # CAD s/p CABG 7 years ago / HFREF s/p AICD\par - stable, denies any cp or sob\par - nuclear study in 2019: showed reversible defect --> Cath done in Jan 2020 --> patent vessels---> optimize medical therapy\par - c/w meds- ASA, lipitor, toprol xl, plavix, imdur, ranexa\par - recently started on entresto and spironolactone --> will check bmp\par - f/u with Cardiology (Dr. Bolton)\par \par # HTN\par - controlled\par - c/w meds\par \par # DLD\par - c/w statin\par \par # GERD\par - c/w famotidine q24\par \par # mild hyperthyroidism\par - repeat TSH and T4 next visit\par \par # HCM\par - mammogram ordered\par - colonoscopy: done 3 yrs ago \par - pap: deferred\par - Dexa ordered\par - PNA vaccine 23 given\par \par # f/u in 3 months.

## 2020-09-02 LAB
ALBUMIN SERPL ELPH-MCNC: 4.5 G/DL
ALP BLD-CCNC: 47 U/L
ALT SERPL-CCNC: 15 U/L
ANION GAP SERPL CALC-SCNC: 14 MMOL/L
AST SERPL-CCNC: 18 U/L
BILIRUB SERPL-MCNC: 0.5 MG/DL
BUN SERPL-MCNC: 13 MG/DL
CALCIUM SERPL-MCNC: 9.5 MG/DL
CHLORIDE SERPL-SCNC: 93 MMOL/L
CO2 SERPL-SCNC: 27 MMOL/L
CREAT SERPL-MCNC: 0.7 MG/DL
GLUCOSE SERPL-MCNC: 95 MG/DL
POTASSIUM SERPL-SCNC: 3.9 MMOL/L
PROT SERPL-MCNC: 6.6 G/DL
SODIUM SERPL-SCNC: 134 MMOL/L

## 2020-09-15 ENCOUNTER — OUTPATIENT (OUTPATIENT)
Dept: OUTPATIENT SERVICES | Facility: HOSPITAL | Age: 70
LOS: 1 days | Discharge: HOME | End: 2020-09-15

## 2020-09-15 ENCOUNTER — APPOINTMENT (OUTPATIENT)
Dept: CARDIOLOGY | Facility: CLINIC | Age: 70
End: 2020-09-15
Payer: MEDICAID

## 2020-09-15 VITALS
WEIGHT: 176 LBS | HEART RATE: 58 BPM | HEIGHT: 62 IN | BODY MASS INDEX: 32.39 KG/M2 | TEMPERATURE: 97.8 F | SYSTOLIC BLOOD PRESSURE: 103 MMHG | DIASTOLIC BLOOD PRESSURE: 67 MMHG

## 2020-09-15 DIAGNOSIS — E78.5 HYPERLIPIDEMIA, UNSPECIFIED: ICD-10-CM

## 2020-09-15 DIAGNOSIS — Z95.810 PRESENCE OF AUTOMATIC (IMPLANTABLE) CARDIAC DEFIBRILLATOR: Chronic | ICD-10-CM

## 2020-09-15 DIAGNOSIS — Z95.1 PRESENCE OF AORTOCORONARY BYPASS GRAFT: Chronic | ICD-10-CM

## 2020-09-15 DIAGNOSIS — I25.5 ISCHEMIC CARDIOMYOPATHY: ICD-10-CM

## 2020-09-15 DIAGNOSIS — Z02.9 ENCOUNTER FOR ADMINISTRATIVE EXAMINATIONS, UNSPECIFIED: ICD-10-CM

## 2020-09-15 DIAGNOSIS — Z98.890 OTHER SPECIFIED POSTPROCEDURAL STATES: Chronic | ICD-10-CM

## 2020-09-15 DIAGNOSIS — I50.22 CHRONIC SYSTOLIC (CONGESTIVE) HEART FAILURE: ICD-10-CM

## 2020-09-15 DIAGNOSIS — Z95.810 PRESENCE OF AUTOMATIC (IMPLANTABLE) CARDIAC DEFIBRILLATOR: ICD-10-CM

## 2020-09-15 DIAGNOSIS — I25.10 ATHEROSCLEROTIC HEART DISEASE OF NATIVE CORONARY ARTERY WITHOUT ANGINA PECTORIS: ICD-10-CM

## 2020-09-15 PROCEDURE — 99213 OFFICE O/P EST LOW 20 MIN: CPT

## 2020-09-15 RX ORDER — ASPIRIN 81 MG/1
81 TABLET, COATED ORAL
Qty: 30 | Refills: 2 | Status: COMPLETED | COMMUNITY
Start: 2020-04-01 | End: 2020-09-15

## 2020-09-15 RX ORDER — SACUBITRIL AND VALSARTAN 49; 51 MG/1; MG/1
49-51 TABLET, FILM COATED ORAL TWICE DAILY
Qty: 60 | Refills: 1 | Status: DISCONTINUED | COMMUNITY
Start: 2020-06-29 | End: 2020-09-15

## 2020-09-15 NOTE — PHYSICAL EXAM
[Normal Appearance] : normal appearance [General Appearance - In No Acute Distress] : no acute distress [Normal Conjunctiva] : the conjunctiva exhibited no abnormalities [Normal Oral Mucosa] : normal oral mucosa [Respiration, Rhythm And Depth] : normal respiratory rhythm and effort [Exaggerated Use Of Accessory Muscles For Inspiration] : no accessory muscle use [Heart Rate And Rhythm] : heart rate and rhythm were normal [Chest Palpation] : palpation of the chest revealed no abnormalities [Murmurs] : no murmurs present [Edema] : no peripheral edema present [Bowel Sounds] : normal bowel sounds [Abdomen Soft] : soft [Abdomen Tenderness] : non-tender [] : no hepato-splenomegaly [Cyanosis, Localized] : no localized cyanosis [Abnormal Walk] : normal gait

## 2020-09-15 NOTE — ASSESSMENT
[FreeTextEntry1] : 69 years old female with history of CAD s/p CABG x 4 (LIMA to LAD, SVG to D2, SVG to RCA, SVG to ramus/M2 in 2011), ischemic cardiomyopathy s/p VVI- ICD LRL 40 [facility],  HFrEF, HTN, DLD, presented for routine follow up.\par Pt complaining of calves and thighs pain and weakness \par \par Pt states she has been compliant with her meds. She denies any chest pain, SOB on exertion. \par -stable, clinically euvolemic.\par -Cath (01/2020) diffuse disease, patent grafts and diffuse CAD Jan 2020\par -Echo (1/9/20) EF 31% mild MR\par -NST (12/17/19): Large inferior / lateral / inferolateral infarct. Small inferoseptal reversible defect. EF 25%.\par -MUGA (2016): EF 50%.\par -last AICD interrogation in july no events \par - c/w ASA, Plavix, Lipitor.\par - c/w Toprol, spironolactone Chlorthalidone\par - c/w Imdur and Ranexa\par -c/w entresto 24-26 mg  \par \par will decrease atorvastatin to 40 mg to r/o statin induced myopathy ( recent LDL 59) \par ordered CK will follow up result \par renewed meds; instructed not to take higher dose of entresto sent in last visit \par follow up in clinic with results \par \par \par \par

## 2020-09-15 NOTE — REVIEW OF SYSTEMS
[Negative] : Eyes [Fever] : no fever [Chills] : no chills [Shortness Of Breath] : no shortness of breath [Dyspnea on exertion] : not dyspnea during exertion [Chest  Pressure] : no chest pressure [Chest Pain] : no chest pain [Lower Ext Edema] : no extremity edema [Palpitations] : no palpitations [Cough] : no cough [Wheezing] : no wheezing [Abdominal Pain] : no abdominal pain [Nausea] : no nausea [Heartburn] : no heartburn

## 2020-09-16 ENCOUNTER — RX RENEWAL (OUTPATIENT)
Age: 70
End: 2020-09-16

## 2020-09-22 DIAGNOSIS — E78.5 HYPERLIPIDEMIA, UNSPECIFIED: ICD-10-CM

## 2020-09-22 DIAGNOSIS — I25.5 ISCHEMIC CARDIOMYOPATHY: ICD-10-CM

## 2020-09-22 DIAGNOSIS — I25.10 ATHEROSCLEROTIC HEART DISEASE OF NATIVE CORONARY ARTERY WITHOUT ANGINA PECTORIS: ICD-10-CM

## 2020-09-22 DIAGNOSIS — I50.22 CHRONIC SYSTOLIC (CONGESTIVE) HEART FAILURE: ICD-10-CM

## 2020-09-22 DIAGNOSIS — Z95.810 PRESENCE OF AUTOMATIC (IMPLANTABLE) CARDIAC DEFIBRILLATOR: ICD-10-CM

## 2020-10-01 ENCOUNTER — APPOINTMENT (OUTPATIENT)
Dept: CARDIOLOGY | Facility: CLINIC | Age: 70
End: 2020-10-01
Payer: MEDICARE

## 2020-10-01 ENCOUNTER — APPOINTMENT (OUTPATIENT)
Dept: CARDIOLOGY | Facility: CLINIC | Age: 70
End: 2020-10-01

## 2020-10-01 PROCEDURE — 93294 REM INTERROG EVL PM/LDLS PM: CPT

## 2020-10-01 PROCEDURE — 93296 REM INTERROG EVL PM/IDS: CPT

## 2020-10-29 LAB — CK SERPL-CCNC: 55 U/L

## 2020-11-17 ENCOUNTER — OUTPATIENT (OUTPATIENT)
Dept: OUTPATIENT SERVICES | Facility: HOSPITAL | Age: 70
LOS: 1 days | Discharge: HOME | End: 2020-11-17
Payer: MEDICAID

## 2020-11-17 ENCOUNTER — APPOINTMENT (OUTPATIENT)
Dept: CARDIOLOGY | Facility: CLINIC | Age: 70
End: 2020-11-17
Payer: MEDICAID

## 2020-11-17 VITALS
SYSTOLIC BLOOD PRESSURE: 122 MMHG | HEART RATE: 81 BPM | BODY MASS INDEX: 32.57 KG/M2 | WEIGHT: 177 LBS | HEIGHT: 62 IN | DIASTOLIC BLOOD PRESSURE: 82 MMHG | TEMPERATURE: 98.5 F

## 2020-11-17 DIAGNOSIS — Z95.810 PRESENCE OF AUTOMATIC (IMPLANTABLE) CARDIAC DEFIBRILLATOR: ICD-10-CM

## 2020-11-17 DIAGNOSIS — E78.5 HYPERLIPIDEMIA, UNSPECIFIED: ICD-10-CM

## 2020-11-17 DIAGNOSIS — I25.10 ATHEROSCLEROTIC HEART DISEASE OF NATIVE CORONARY ARTERY WITHOUT ANGINA PECTORIS: ICD-10-CM

## 2020-11-17 DIAGNOSIS — I10 ESSENTIAL (PRIMARY) HYPERTENSION: ICD-10-CM

## 2020-11-17 DIAGNOSIS — I50.22 CHRONIC SYSTOLIC (CONGESTIVE) HEART FAILURE: ICD-10-CM

## 2020-11-17 DIAGNOSIS — Z95.1 PRESENCE OF AORTOCORONARY BYPASS GRAFT: Chronic | ICD-10-CM

## 2020-11-17 DIAGNOSIS — Z98.890 OTHER SPECIFIED POSTPROCEDURAL STATES: Chronic | ICD-10-CM

## 2020-11-17 DIAGNOSIS — Z95.810 PRESENCE OF AUTOMATIC (IMPLANTABLE) CARDIAC DEFIBRILLATOR: Chronic | ICD-10-CM

## 2020-11-17 PROCEDURE — 93010 ELECTROCARDIOGRAM REPORT: CPT

## 2020-11-17 PROCEDURE — 99213 OFFICE O/P EST LOW 20 MIN: CPT

## 2020-11-17 NOTE — PHYSICAL EXAM
[General Appearance - Well Developed] : well developed [Normal Appearance] : normal appearance [Well Groomed] : well groomed [General Appearance - Well Nourished] : well nourished [No Deformities] : no deformities [General Appearance - In No Acute Distress] : no acute distress [Normal Conjunctiva] : the conjunctiva exhibited no abnormalities [Normal Oropharynx] : normal oropharynx [Normal Jugular Venous V Waves Present] : normal jugular venous V waves present [Respiration, Rhythm And Depth] : normal respiratory rhythm and effort [Exaggerated Use Of Accessory Muscles For Inspiration] : no accessory muscle use [Auscultation Breath Sounds / Voice Sounds] : lungs were clear to auscultation bilaterally [Heart Rate And Rhythm] : heart rate and rhythm were normal [Heart Sounds] : normal S1 and S2 [Murmurs] : no murmurs present [Abdomen Soft] : soft [Abdomen Tenderness] : non-tender [Abdomen Mass (___ Cm)] : no abdominal mass palpated [Abnormal Walk] : normal gait [Nail Clubbing] : no clubbing of the fingernails [Cyanosis, Localized] : no localized cyanosis [] : no rash [Skin Lesions] : no skin lesions [Impaired Insight] : insight and judgment were intact [Affect] : the affect was normal [Memory Recent] : recent memory was not impaired

## 2020-11-17 NOTE — HISTORY OF PRESENT ILLNESS
[FreeTextEntry1] : 69y/o female with history of CAD s/p CABG x 4 (LIMA to LAD, SVG to D2, SVG to RCA, SVG to ramus/M2 in 2011), ischemic cardiomyopathy s/p AICD, HFrEF, HTN, DLD, presented for routine follow up.\par \par Last seen in Sep 2020, at that time had some MSK pains so statin dec to 40. \par NST (12/2019): EF 25%, small anteroseptal reversible ischemic area\par Cath (01/2020): diffuse CAD\par Echo (01/2020): EF 31% mild MR\par ICD ([facility], 2016; [facility]) interrogation 10/2020 - no events\par \par No current complaints - no CP, palpitations, red activity, difficulty breathing. Isn't doing much phyiscal activity due to COVID and cold weather but doesn't complaint of dec exercise tolerance at this time. \par Leg pains improved, CK WNL. \par EKG (11/2020): sinus rhythm 1' AV block, PVC, LVH mod\par

## 2020-11-17 NOTE — ASSESSMENT
[FreeTextEntry1] : 69y/o female with history of CAD s/p CABG x 4 (LIMA to LAD, SVG to D2, SVG to RCA, SVG to ramus/M2 in 2011), ischemic cardiomyopathy s/p AICD, HFrEF, HTN, DLD here for follow-up.\par \par #R/o statin myopathy\par #Diffuse CAD s/p CABG\par #CHFrEF s/p AICD

## 2020-11-17 NOTE — DISCUSSION/SUMMARY
[FreeTextEntry1] : last AICD interrogation (01/2020) no events\par renewed ASA, Plavix\par renwed Toprol, spironolactone Chlorthalidone\par renewed Imdur 30mg TID and Ranexa\par renewed entresto 24-26 mg \par renewed lipitor reduced last visit, CK WNL, muscle pains improved\par repeat lipid before next visit\par consider repeating echo and cardiac workup as appropriate next visit\par \par RTC 3-4mo

## 2021-01-04 ENCOUNTER — APPOINTMENT (OUTPATIENT)
Dept: CARDIOLOGY | Facility: CLINIC | Age: 71
End: 2021-01-04
Payer: MEDICAID

## 2021-01-04 PROCEDURE — 93296 REM INTERROG EVL PM/IDS: CPT

## 2021-01-04 PROCEDURE — 93295 DEV INTERROG REMOTE 1/2/MLT: CPT

## 2021-01-10 LAB
ALBUMIN SERPL ELPH-MCNC: 4.7 G/DL
ALP BLD-CCNC: 44 U/L
ALT SERPL-CCNC: 21 U/L
ANION GAP SERPL CALC-SCNC: 12 MMOL/L
AST SERPL-CCNC: 21 U/L
BASOPHILS # BLD AUTO: 0.02 K/UL
BASOPHILS NFR BLD AUTO: 0.4 %
BILIRUB SERPL-MCNC: 0.5 MG/DL
BUN SERPL-MCNC: 17 MG/DL
CALCIUM SERPL-MCNC: 10 MG/DL
CHLORIDE SERPL-SCNC: 96 MMOL/L
CHOLEST SERPL-MCNC: 171 MG/DL
CO2 SERPL-SCNC: 27 MMOL/L
CREAT SERPL-MCNC: 1 MG/DL
EOSINOPHIL # BLD AUTO: 0.04 K/UL
EOSINOPHIL NFR BLD AUTO: 0.7 %
ESTIMATED AVERAGE GLUCOSE: 126 MG/DL
GLUCOSE SERPL-MCNC: 107 MG/DL
HBA1C MFR BLD HPLC: 6 %
HCT VFR BLD CALC: 38.1 %
HDLC SERPL-MCNC: 79 MG/DL
HGB BLD-MCNC: 12.8 G/DL
IMM GRANULOCYTES NFR BLD AUTO: 0.4 %
LDLC SERPL CALC-MCNC: 82 MG/DL
LYMPHOCYTES # BLD AUTO: 2.29 K/UL
LYMPHOCYTES NFR BLD AUTO: 41.8 %
MAN DIFF?: NORMAL
MCHC RBC-ENTMCNC: 32.8 PG
MCHC RBC-ENTMCNC: 33.6 G/DL
MCV RBC AUTO: 97.7 FL
MONOCYTES # BLD AUTO: 0.61 K/UL
MONOCYTES NFR BLD AUTO: 11.1 %
NEUTROPHILS # BLD AUTO: 2.5 K/UL
NEUTROPHILS NFR BLD AUTO: 45.6 %
NONHDLC SERPL-MCNC: 92 MG/DL
PLATELET # BLD AUTO: 290 K/UL
POTASSIUM SERPL-SCNC: 4.7 MMOL/L
PROT SERPL-MCNC: 7.2 G/DL
RBC # BLD: 3.9 M/UL
RBC # FLD: 12.7 %
SODIUM SERPL-SCNC: 135 MMOL/L
TRIGL SERPL-MCNC: 95 MG/DL
TSH SERPL-ACNC: 4.46 UIU/ML
WBC # FLD AUTO: 5.48 K/UL

## 2021-01-20 ENCOUNTER — APPOINTMENT (OUTPATIENT)
Dept: INTERNAL MEDICINE | Facility: CLINIC | Age: 71
End: 2021-01-20
Payer: MEDICAID

## 2021-01-20 ENCOUNTER — OUTPATIENT (OUTPATIENT)
Dept: OUTPATIENT SERVICES | Facility: HOSPITAL | Age: 71
LOS: 1 days | Discharge: HOME | End: 2021-01-20

## 2021-01-20 VITALS
SYSTOLIC BLOOD PRESSURE: 122 MMHG | TEMPERATURE: 98.3 F | DIASTOLIC BLOOD PRESSURE: 82 MMHG | HEART RATE: 80 BPM | OXYGEN SATURATION: 96 % | HEIGHT: 62 IN | WEIGHT: 178 LBS | BODY MASS INDEX: 32.76 KG/M2

## 2021-01-20 DIAGNOSIS — Z95.810 PRESENCE OF AUTOMATIC (IMPLANTABLE) CARDIAC DEFIBRILLATOR: Chronic | ICD-10-CM

## 2021-01-20 DIAGNOSIS — Z00.00 ENCOUNTER FOR GENERAL ADULT MEDICAL EXAMINATION WITHOUT ABNORMAL FINDINGS: ICD-10-CM

## 2021-01-20 DIAGNOSIS — Z98.890 OTHER SPECIFIED POSTPROCEDURAL STATES: Chronic | ICD-10-CM

## 2021-01-20 DIAGNOSIS — Z95.1 PRESENCE OF AORTOCORONARY BYPASS GRAFT: Chronic | ICD-10-CM

## 2021-01-20 PROCEDURE — 99213 OFFICE O/P EST LOW 20 MIN: CPT | Mod: GC

## 2021-01-20 NOTE — PHYSICAL EXAM
[No Acute Distress] : no acute distress [Well Nourished] : well nourished [Well Developed] : well developed [Well-Appearing] : well-appearing [Normal Sclera/Conjunctiva] : normal sclera/conjunctiva [PERRL] : pupils equal round and reactive to light [EOMI] : extraocular movements intact [Normal Outer Ear/Nose] : the outer ears and nose were normal in appearance [Normal Oropharynx] : the oropharynx was normal [No JVD] : no jugular venous distention [No Lymphadenopathy] : no lymphadenopathy [Supple] : supple [Thyroid Normal, No Nodules] : the thyroid was normal and there were no nodules present [No Respiratory Distress] : no respiratory distress  [No Accessory Muscle Use] : no accessory muscle use [Clear to Auscultation] : lungs were clear to auscultation bilaterally [Normal Rate] : normal rate  [Regular Rhythm] : with a regular rhythm [Normal S1, S2] : normal S1 and S2 [No Murmur] : no murmur heard [No Edema] : there was no peripheral edema [Soft] : abdomen soft [Non Tender] : non-tender [Non-distended] : non-distended [No Masses] : no abdominal mass palpated [No HSM] : no HSM [Normal Bowel Sounds] : normal bowel sounds [Normal Posterior Cervical Nodes] : no posterior cervical lymphadenopathy [Normal Anterior Cervical Nodes] : no anterior cervical lymphadenopathy [No Joint Swelling] : no joint swelling [Grossly Normal Strength/Tone] : grossly normal strength/tone [No Rash] : no rash [Coordination Grossly Intact] : coordination grossly intact [No Focal Deficits] : no focal deficits [Normal Gait] : normal gait [Normal Affect] : the affect was normal [Normal Insight/Judgement] : insight and judgment were intact

## 2021-02-05 NOTE — REVIEW OF SYSTEMS
[Fever] : no fever [Chills] : no chills [Fatigue] : no fatigue [Night Sweats] : no night sweats [Discharge] : no discharge [Vision Problems] : no vision problems [Earache] : no earache [Hearing Loss] : no hearing loss [Chest Pain] : no chest pain [Palpitations] : no palpitations [Leg Claudication] : no leg claudication [Lower Ext Edema] : no lower extremity edema [Orthopnea] : no orthopnea [Paroxysmal Nocturnal Dyspnea] : no paroxysmal nocturnal dyspnea [Shortness Of Breath] : no shortness of breath [Wheezing] : no wheezing [Cough] : no cough [Dyspnea on Exertion] : no dyspnea on exertion [Abdominal Pain] : no abdominal pain [Nausea] : no nausea [Constipation] : no constipation [Vomiting] : no vomiting [Heartburn] : no heartburn [Dysuria] : no dysuria [Incontinence] : no incontinence [Nocturia] : no nocturia [Joint Pain] : no joint pain [Joint Stiffness] : no joint stiffness [Joint Swelling] : no joint swelling [Itching] : no itching [Skin Rash] : no skin rash [Headache] : no headache [Dizziness] : no dizziness [Fainting] : no fainting [Memory Loss] : no memory loss

## 2021-02-05 NOTE — HISTORY OF PRESENT ILLNESS
[FreeTextEntry1] : Follow-Up  [de-identified] : 71 yo F with CAD s/p CABG, systolic CHF with AICD, HFrEF 31%, HTN, HLD, BPPV, GERD and pre-DM presents for routine follow up appointment. Patient has been compliant with her medication and follows with cardiology regularly, last visit November 2020. Denies fever/chills, abdominal pain, n/v/d/c. She lives home with family, self ambulates, social hx -.  \par

## 2021-02-05 NOTE — ASSESSMENT
[FreeTextEntry1] : 69 yo F with CAD s/p CABG, systolic CHF with AICD, HTN, HLD, BPPV, GERD and pre-DM presents for routine follow up appointment. Today she presents for medication refills. \par \par # CAD s/p CABG 7 years ago / HFrEF (31%) s/p AICD\par - stable, denies any cp or sob\par - nuclear study in 2019: showed reversible defect --> Cath done in Jan 2020 --> patent vessels---> optimize medical therapy\par - c/w meds per cardiology  (ASA, lipitor, toprol xl, plavix, imdur, ranexa, spironolactone, chlorthalidone)\par - f/u with Cardiology (Dr. Oliva), last seen November 2020, all medications renewed \par \par # HTN -controlled \par - c/w meds per cardiology \par \par # DLD\par - c/w statin \par - dose reduced to 40 mg per cardiology due to muscle pain, normal CPK \par \par # GERD\par - c/w famotidine q24\par \par # mild hypothyroidism\par - repeat TSH and T4 next visit\par - TSH elevated: 4.46 \par \par # Pre-diabetes\par - A1c 6.0 \par - diet & exercise \par \par #osteopenia on DEXA \par - will start on Calcitrate 950 mg QD \par - repeat DEXA in 2-3 years \par \par # HCM\par - mammogram: July 2020, BiRads 1 \par - colonoscopy: done 5 yrs ago per patient, normal results \par - pap: > 5 years ago, deferred\par - Dexa July 2020: repeat in 2-3 years \par - PNA vaccine UTD\par - RTc in 3 months w/ blood work 1 week prior \par - received flu vaccine this year at outside facility \par \par # f/u in 3 months

## 2021-02-23 ENCOUNTER — OUTPATIENT (OUTPATIENT)
Dept: OUTPATIENT SERVICES | Facility: HOSPITAL | Age: 71
LOS: 1 days | Discharge: HOME | End: 2021-02-23

## 2021-02-23 ENCOUNTER — APPOINTMENT (OUTPATIENT)
Dept: CARDIOLOGY | Facility: CLINIC | Age: 71
End: 2021-02-23
Payer: MEDICAID

## 2021-02-23 VITALS
OXYGEN SATURATION: 97 % | WEIGHT: 180 LBS | TEMPERATURE: 98.3 F | HEART RATE: 64 BPM | BODY MASS INDEX: 33.13 KG/M2 | HEIGHT: 62 IN | SYSTOLIC BLOOD PRESSURE: 118 MMHG | DIASTOLIC BLOOD PRESSURE: 72 MMHG

## 2021-02-23 DIAGNOSIS — Z98.890 OTHER SPECIFIED POSTPROCEDURAL STATES: Chronic | ICD-10-CM

## 2021-02-23 DIAGNOSIS — Z95.810 PRESENCE OF AUTOMATIC (IMPLANTABLE) CARDIAC DEFIBRILLATOR: Chronic | ICD-10-CM

## 2021-02-23 DIAGNOSIS — Z95.1 PRESENCE OF AORTOCORONARY BYPASS GRAFT: Chronic | ICD-10-CM

## 2021-02-23 PROCEDURE — 99213 OFFICE O/P EST LOW 20 MIN: CPT

## 2021-02-23 NOTE — ASSESSMENT
[FreeTextEntry1] : 71y/o female with history of CAD s/p CABG x 4 (LIMA to LAD, SVG to D2, SVG to RCA, SVG to ramus/M2 in 2011), ischemic cardiomyopathy s/p AICD, HFrEF, HTN, DLD, presented for routine follow up.\par \par \par \par #Diffuse CAD s/p CABG\par # H/O HTN\par - last cath 1/2020 with diffuse CAD\par - c/w aspirin, Plavix and atorvastatin\par - c/w metoprolol and Ranolazine \par - c/w Isosorbide mononitrate 30 mg QD \par \par #CHFrEF s/p AICD. \par - clinically stable, denies symptoms \par - c/w Aldactone, chlorthalidone \par - c/w Entresto\par - c/w metoprolol\par - F/U with EP, has appointment in April \par \par - Follow up in 6 months or as needed \par \par \par

## 2021-02-23 NOTE — PHYSICAL EXAM
[General Appearance - Well Developed] : well developed [Normal Appearance] : normal appearance [Well Groomed] : well groomed [General Appearance - Well Nourished] : well nourished [No Deformities] : no deformities [General Appearance - In No Acute Distress] : no acute distress [Normal Conjunctiva] : the conjunctiva exhibited no abnormalities [Eyelids - No Xanthelasma] : the eyelids demonstrated no xanthelasmas [Normal Oral Mucosa] : normal oral mucosa [No Oral Pallor] : no oral pallor [No Oral Cyanosis] : no oral cyanosis [Normal Jugular Venous A Waves Present] : normal jugular venous A waves present [Normal Jugular Venous V Waves Present] : normal jugular venous V waves present [No Jugular Venous Carver A Waves] : no jugular venous carver A waves [Respiration, Rhythm And Depth] : normal respiratory rhythm and effort [Exaggerated Use Of Accessory Muscles For Inspiration] : no accessory muscle use [Auscultation Breath Sounds / Voice Sounds] : lungs were clear to auscultation bilaterally [Heart Rate And Rhythm] : heart rate and rhythm were normal [Heart Sounds] : normal S1 and S2 [Murmurs] : no murmurs present [Abdomen Soft] : soft [Abdomen Tenderness] : non-tender [Abdomen Mass (___ Cm)] : no abdominal mass palpated [Abnormal Walk] : normal gait [Gait - Sufficient For Exercise Testing] : the gait was sufficient for exercise testing [Nail Clubbing] : no clubbing of the fingernails [Cyanosis, Localized] : no localized cyanosis [Petechial Hemorrhages (___cm)] : no petechial hemorrhages [Skin Color & Pigmentation] : normal skin color and pigmentation [] : no rash [No Venous Stasis] : no venous stasis [Skin Lesions] : no skin lesions [No Skin Ulcers] : no skin ulcer [No Xanthoma] : no  xanthoma was observed

## 2021-02-23 NOTE — HISTORY OF PRESENT ILLNESS
[FreeTextEntry1] : 71y/o female with history of CAD s/p CABG x 4 (LIMA to LAD, SVG to D2, SVG to RCA, SVG to ramus/M2 in 2011), ischemic cardiomyopathy s/p AICD, HFrEF, HTN, DLD, presented for routine follow up.\par \par she denies any chest pain, SOB, orthopnea, PND, or lower ex edema denies palpitation or CARRERA. no AICD firing \par \par NST (12/2019): EF 25%, small anteroseptal reversible ischemic area\par Cath (01/2020): diffuse CAD\par Echo (01/2020): EF 31% mild MR\par ICD ([facility], 2016; [facility]) interrogation 10/2020 - no events, will see dr vasquez in april \par in Sep 2020, at that time had some MSK pains so statin dec to 40. \par \par Cath 1/2020:\par 1. 3-vessel coronary artery disease.\par 2. Proximal LAD occlusion with patent LIMA-to-LAD and SVG-to-2nd Diagonal.\par 3. Subtotal 2nd Diagonal occlusion distal to SVG anastomosis with faint\par collateral filling.\par 4. High OM1 occlusion.\par 5. Known SVG-to-RCA and SVG [id number] / OM2 occlusions.\par 6. Patent RCA stent.\par EKG (2/2021): sinus rhythm 1' AV block, PVC, LVH mod\par \par patient is on aspirin, plavix, statin, Entresto, chlorthalidone, Aldactone, isosorbid mononitrate, ranolazine and metoprolol \par

## 2021-02-24 DIAGNOSIS — I10 ESSENTIAL (PRIMARY) HYPERTENSION: ICD-10-CM

## 2021-02-24 DIAGNOSIS — I25.5 ISCHEMIC CARDIOMYOPATHY: ICD-10-CM

## 2021-02-24 DIAGNOSIS — I25.10 ATHEROSCLEROTIC HEART DISEASE OF NATIVE CORONARY ARTERY WITHOUT ANGINA PECTORIS: ICD-10-CM

## 2021-02-24 DIAGNOSIS — Z95.810 PRESENCE OF AUTOMATIC (IMPLANTABLE) CARDIAC DEFIBRILLATOR: ICD-10-CM

## 2021-04-08 ENCOUNTER — APPOINTMENT (OUTPATIENT)
Dept: CARDIOLOGY | Facility: CLINIC | Age: 71
End: 2021-04-08
Payer: MEDICARE

## 2021-04-08 VITALS
HEIGHT: 62 IN | WEIGHT: 177 LBS | DIASTOLIC BLOOD PRESSURE: 70 MMHG | TEMPERATURE: 96.7 F | BODY MASS INDEX: 32.57 KG/M2 | SYSTOLIC BLOOD PRESSURE: 110 MMHG

## 2021-04-08 PROCEDURE — 93282 PRGRMG EVAL IMPLANTABLE DFB: CPT

## 2021-04-08 PROCEDURE — 99072 ADDL SUPL MATRL&STAF TM PHE: CPT

## 2021-04-08 NOTE — PROCEDURE
[No] : not [NSR] : normal sinus rhythm [ICD] : Implantable cardioverter-defibrillator [VVI] : VVI [Charge Time: ___ sec] : charge time was [unfilled] seconds [Normal] : The battery status is normal. [Lead Imp:  ___ohms] : lead impedance was [unfilled] ohms [Sensing Amplitude ___mv] : sensing amplitude was [unfilled] mv [___V @] : [unfilled] V [___ ms] : [unfilled] ms [None] : none [Pace ___ %] : Pace [unfilled]% [de-identified] : [employer] [de-identified] : [id number] [de-identified] : [id number] [de-identified] : 9/8/2016 [de-identified] : 40 [de-identified] : 5.6 years [de-identified] : No new episodes

## 2021-05-05 ENCOUNTER — NON-APPOINTMENT (OUTPATIENT)
Age: 71
End: 2021-05-05

## 2021-05-06 ENCOUNTER — RX RENEWAL (OUTPATIENT)
Age: 71
End: 2021-05-06

## 2021-05-11 ENCOUNTER — NON-APPOINTMENT (OUTPATIENT)
Age: 71
End: 2021-05-11

## 2021-05-11 ENCOUNTER — APPOINTMENT (OUTPATIENT)
Dept: CARDIOLOGY | Facility: CLINIC | Age: 71
End: 2021-05-11
Payer: MEDICAID

## 2021-05-11 PROCEDURE — 93295 DEV INTERROG REMOTE 1/2/MLT: CPT

## 2021-05-11 PROCEDURE — 93296 REM INTERROG EVL PM/IDS: CPT

## 2021-06-07 ENCOUNTER — RX RENEWAL (OUTPATIENT)
Age: 71
End: 2021-06-07

## 2021-07-21 ENCOUNTER — APPOINTMENT (OUTPATIENT)
Dept: INTERNAL MEDICINE | Facility: CLINIC | Age: 71
End: 2021-07-21
Payer: MEDICAID

## 2021-07-21 ENCOUNTER — OUTPATIENT (OUTPATIENT)
Dept: OUTPATIENT SERVICES | Facility: HOSPITAL | Age: 71
LOS: 1 days | Discharge: HOME | End: 2021-07-21

## 2021-07-21 VITALS
BODY MASS INDEX: 31.28 KG/M2 | OXYGEN SATURATION: 99 % | DIASTOLIC BLOOD PRESSURE: 60 MMHG | HEART RATE: 60 BPM | TEMPERATURE: 97 F | SYSTOLIC BLOOD PRESSURE: 112 MMHG | WEIGHT: 170 LBS | HEIGHT: 62 IN

## 2021-07-21 DIAGNOSIS — I10 ESSENTIAL (PRIMARY) HYPERTENSION: ICD-10-CM

## 2021-07-21 DIAGNOSIS — Z95.1 PRESENCE OF AORTOCORONARY BYPASS GRAFT: Chronic | ICD-10-CM

## 2021-07-21 DIAGNOSIS — R05 COUGH: ICD-10-CM

## 2021-07-21 DIAGNOSIS — Z98.890 OTHER SPECIFIED POSTPROCEDURAL STATES: Chronic | ICD-10-CM

## 2021-07-21 DIAGNOSIS — E78.5 HYPERLIPIDEMIA, UNSPECIFIED: ICD-10-CM

## 2021-07-21 DIAGNOSIS — R73.9 HYPERGLYCEMIA, UNSPECIFIED: ICD-10-CM

## 2021-07-21 DIAGNOSIS — Z00.00 ENCOUNTER FOR GENERAL ADULT MEDICAL EXAMINATION WITHOUT ABNORMAL FINDINGS: ICD-10-CM

## 2021-07-21 DIAGNOSIS — Z95.810 PRESENCE OF AUTOMATIC (IMPLANTABLE) CARDIAC DEFIBRILLATOR: Chronic | ICD-10-CM

## 2021-07-21 PROCEDURE — 99213 OFFICE O/P EST LOW 20 MIN: CPT | Mod: GC

## 2021-07-21 NOTE — PHYSICAL EXAM
[No Acute Distress] : no acute distress [Well Nourished] : well nourished [Well Developed] : well developed [Normal Sclera/Conjunctiva] : normal sclera/conjunctiva [PERRL] : pupils equal round and reactive to light [Normal Outer Ear/Nose] : the outer ears and nose were normal in appearance [Normal Oropharynx] : the oropharynx was normal [No JVD] : no jugular venous distention [No Lymphadenopathy] : no lymphadenopathy [No Respiratory Distress] : no respiratory distress  [No Accessory Muscle Use] : no accessory muscle use [Normal Rate] : normal rate  [Regular Rhythm] : with a regular rhythm [No Edema] : there was no peripheral edema [Soft] : abdomen soft [No HSM] : no HSM [Normal Anterior Cervical Nodes] : no anterior cervical lymphadenopathy [No CVA Tenderness] : no CVA  tenderness [No Joint Swelling] : no joint swelling [No Rash] : no rash [Coordination Grossly Intact] : coordination grossly intact [Normal Insight/Judgement] : insight and judgment were intact

## 2021-07-26 NOTE — REVIEW OF SYSTEMS
[Cough] : cough [Fever] : no fever [Chills] : no chills [Night Sweats] : no night sweats [Recent Change In Weight] : ~T no recent weight change [Discharge] : no discharge [Pain] : no pain [Vision Problems] : no vision problems [Earache] : no earache [Hearing Loss] : no hearing loss [Nasal Discharge] : no nasal discharge [Chest Pain] : no chest pain [Palpitations] : no palpitations [Orthopnea] : no orthopnea [Paroxysmal Nocturnal Dyspnea] : no paroxysmal nocturnal dyspnea [Shortness Of Breath] : no shortness of breath [Wheezing] : no wheezing [Abdominal Pain] : no abdominal pain [Nausea] : no nausea [Vomiting] : no vomiting [Heartburn] : no heartburn [Melena] : no melena [Dysuria] : no dysuria [Incontinence] : no incontinence [Hematuria] : no hematuria [Frequency] : no frequency [Joint Pain] : no joint pain [Muscle Pain] : no muscle pain [Itching] : no itching [Skin Rash] : no skin rash [Headache] : no headache [Memory Loss] : no memory loss [Suicidal] : not suicidal [Easy Bleeding] : no easy bleeding

## 2021-07-26 NOTE — ASSESSMENT
[FreeTextEntry1] : 72 yo F with CAD s/p CABG, systolic CHF with AICD, HFrEF 31%, HTN, HLD, BPPV, GERD and pre-DM presents for routine follow up appointment. Patient has been compliant with her medication and follows with cardiology and EP, in april 2021. Denies fever/chills, abdominal pain, n/v/d/c. She lives home with family, self ambulates. She has no complaints and feels well. \par Medications are refilled. Patient complained of chronic cough without any seasonal variation or allergens. She also brings up a little phelgm and gets slightly short of breath on exertion. PFT referral will be provided. \par \par #Hyponatremia_ seen on latest blood work\par -will repeat BMP in 1 week with urine osm, urine Na, Serum osm and follow up with deranged studies\par \par \par # CAD s/p CABG 7 years ago / HFrEF (31%) s/p AICD\par - nuclear study in 2019: showed reversible defect --> Cath done in Jan 2020 --> patent vessels---> optimize medical therapy\par - c/w meds per cardiology (ASA, lipitor, toprol xl, plavix, imdur, ranexa, spironolactone, chlorthalidone)\par - f/u with Cardiology (Dr. Oliva) and EP uptodate\par \par # HTN \par -controlled \par - c/w meds per cardiology \par \par # DLD\par - c/w statin \par - dose was reduced to 40 mg per cardiology due to muscle pain, normal CPK \par \par # GERD\par - c/w famotidine q24\par \par # mild hypothyroidism\par - repeat TSH and T4 wnl\par - TSH elevated: 4.46 \par \par # Pre-diabetes\par - A1c 6.0 improve to 5.5 now\par - diet & exercise \par \par #osteopenia on DEXA \par - c/w Calcitrate 950 mg QD \par - repeat DEXA in 2-3 years \par \par # HCM\par - mammogram: July 2020, BiRads 1 \par - colonoscopy: done 6 yrs ago per patient, normal results \par - pap: > 5 years ago, deferred\par - Dexa July 2020: repeat in 2-3 years \par - PNA vaccine UTD\par - RTc in 6 months w/ blood work 1 week prior \par - received flu vaccine last year 2020 at outside facility \par \par # f/u in 6 months. \par \par

## 2021-07-26 NOTE — HISTORY OF PRESENT ILLNESS
[FreeTextEntry1] : 72 Yo F here for routine follow up.  [de-identified] : 70 yo F with CAD s/p CABG, systolic CHF with AICD, HFrEF 31%, HTN, HLD, BPPV, GERD and pre-DM presents for routine follow up appointment. Patient has been compliant with her medication and follows with cardiology and EP, in april 2021. Denies fever/chills, abdominal pain, n/v/d/c. She lives home with family, self ambulates. She has no complaints and feels well.

## 2021-08-10 ENCOUNTER — APPOINTMENT (OUTPATIENT)
Dept: CARDIOLOGY | Facility: CLINIC | Age: 71
End: 2021-08-10
Payer: MEDICAID

## 2021-08-10 ENCOUNTER — NON-APPOINTMENT (OUTPATIENT)
Age: 71
End: 2021-08-10

## 2021-08-10 PROCEDURE — 93295 DEV INTERROG REMOTE 1/2/MLT: CPT

## 2021-08-10 PROCEDURE — 93296 REM INTERROG EVL PM/IDS: CPT

## 2021-08-24 ENCOUNTER — OUTPATIENT (OUTPATIENT)
Dept: OUTPATIENT SERVICES | Facility: HOSPITAL | Age: 71
LOS: 1 days | Discharge: HOME | End: 2021-08-24
Payer: MEDICAID

## 2021-08-24 ENCOUNTER — APPOINTMENT (OUTPATIENT)
Dept: CARDIOLOGY | Facility: CLINIC | Age: 71
End: 2021-08-24
Payer: MEDICAID

## 2021-08-24 VITALS
SYSTOLIC BLOOD PRESSURE: 94 MMHG | TEMPERATURE: 96.8 F | DIASTOLIC BLOOD PRESSURE: 61 MMHG | OXYGEN SATURATION: 98 % | BODY MASS INDEX: 31.83 KG/M2 | HEART RATE: 56 BPM | HEIGHT: 62 IN | WEIGHT: 173 LBS

## 2021-08-24 DIAGNOSIS — I25.5 ISCHEMIC CARDIOMYOPATHY: ICD-10-CM

## 2021-08-24 DIAGNOSIS — I10 ESSENTIAL (PRIMARY) HYPERTENSION: ICD-10-CM

## 2021-08-24 DIAGNOSIS — I25.10 ATHEROSCLEROTIC HEART DISEASE OF NATIVE CORONARY ARTERY WITHOUT ANGINA PECTORIS: ICD-10-CM

## 2021-08-24 DIAGNOSIS — Z95.1 PRESENCE OF AORTOCORONARY BYPASS GRAFT: Chronic | ICD-10-CM

## 2021-08-24 DIAGNOSIS — Z98.890 OTHER SPECIFIED POSTPROCEDURAL STATES: Chronic | ICD-10-CM

## 2021-08-24 DIAGNOSIS — Z95.810 PRESENCE OF AUTOMATIC (IMPLANTABLE) CARDIAC DEFIBRILLATOR: Chronic | ICD-10-CM

## 2021-08-24 DIAGNOSIS — E78.5 HYPERLIPIDEMIA, UNSPECIFIED: ICD-10-CM

## 2021-08-24 DIAGNOSIS — R05 COUGH: ICD-10-CM

## 2021-08-24 DIAGNOSIS — I50.22 CHRONIC SYSTOLIC (CONGESTIVE) HEART FAILURE: ICD-10-CM

## 2021-08-24 DIAGNOSIS — Z95.810 PRESENCE OF AUTOMATIC (IMPLANTABLE) CARDIAC DEFIBRILLATOR: ICD-10-CM

## 2021-08-24 PROCEDURE — 94729 DIFFUSING CAPACITY: CPT | Mod: 26

## 2021-08-24 PROCEDURE — 99214 OFFICE O/P EST MOD 30 MIN: CPT

## 2021-08-24 PROCEDURE — 94060 EVALUATION OF WHEEZING: CPT | Mod: 26

## 2021-08-24 PROCEDURE — 93010 ELECTROCARDIOGRAM REPORT: CPT

## 2021-08-24 PROCEDURE — 94727 GAS DIL/WSHOT DETER LNG VOL: CPT | Mod: 26

## 2021-08-24 NOTE — ASSESSMENT
[FreeTextEntry1] : 69y/o female with history of CAD s/p CABG x 4 (LIMA to LAD, SVG to D2, SVG to RCA, SVG to ramus/M2 in 2011), ischemic cardiomyopathy s/p AICD, HFrEF, HTN, DLD, presented for routine follow up.\par \par \par \par #Diffuse CAD s/p CABG\par # H/O HTN\par - last cath 1/2020 with diffuse CAD\par - c/w aspirin, Plavix and atorvastatin\par - c/w metoprolol and Ranolazine \par - c/w Isosorbide mononitrate 30 mg QD \par \par #CHFrEF s/p AICD. \par - clinically stable, denies symptoms \par - c/w Aldactone, chlorthalidone \par - c/w Entresto\par - c/w metoprolol\par - F/U with EP for device \par \par - Follow up in 6 months or as needed \par \par \par

## 2021-08-24 NOTE — HISTORY OF PRESENT ILLNESS
[FreeTextEntry1] : 71y/o female with history of CAD s/p CABG x 4 (LIMA to LAD, SVG to D2, SVG to RCA, SVG to ramus/M2 in 2011), ischemic cardiomyopathy s/p AICD, HFrEF, HTN, DLD, presented for routine follow up.\par \par she denies any chest pain, SOB, orthopnea, PND, or lower ex edema denies palpitation or CARRERA. no AICD firing \par \par NST (12/2019): EF 25%, small anteroseptal reversible ischemic area\par Cath (01/2020): diffuse CAD\par Echo (01/2020): EF 31% mild MR\par ICD ([facility], 2016; [facility]) interrogation 10/2020 - no events, will see dr vasquez in april \par in Sep 2020, at that time had some MSK pains so statin dec to 40. \par \par Cath 1/2020:\par 1. 3-vessel coronary artery disease.\par 2. Proximal LAD occlusion with patent LIMA-to-LAD and SVG-to-2nd Diagonal.\par 3. Subtotal 2nd Diagonal occlusion distal to SVG anastomosis with faint\par collateral filling.\par 4. High OM1 occlusion.\par 5. Known SVG-to-RCA and SVG Zexijcdopu-vf-[id number] / OM2 occlusions.\par 6. Patent RCA stent.\par EKG (2/2021): sinus rhythm 1' AV block, PVC, LVH mod\par \par patient is on aspirin, plavix, statin, Entresto, chlorthalidone, Aldactone, isosorbid mononitrate, ranolazine and metoprolol \par

## 2021-08-27 DIAGNOSIS — Z02.9 ENCOUNTER FOR ADMINISTRATIVE EXAMINATIONS, UNSPECIFIED: ICD-10-CM

## 2021-08-27 DIAGNOSIS — R05 COUGH: ICD-10-CM

## 2021-10-21 ENCOUNTER — OUTPATIENT (OUTPATIENT)
Dept: OUTPATIENT SERVICES | Facility: HOSPITAL | Age: 71
LOS: 1 days | Discharge: HOME | End: 2021-10-21

## 2021-10-21 ENCOUNTER — APPOINTMENT (OUTPATIENT)
Dept: INTERNAL MEDICINE | Facility: CLINIC | Age: 71
End: 2021-10-21
Payer: MEDICAID

## 2021-10-21 ENCOUNTER — NON-APPOINTMENT (OUTPATIENT)
Age: 71
End: 2021-10-21

## 2021-10-21 VITALS
DIASTOLIC BLOOD PRESSURE: 61 MMHG | OXYGEN SATURATION: 98 % | SYSTOLIC BLOOD PRESSURE: 122 MMHG | TEMPERATURE: 97.8 F | BODY MASS INDEX: 32.94 KG/M2 | HEART RATE: 78 BPM | HEIGHT: 62 IN | WEIGHT: 179 LBS

## 2021-10-21 DIAGNOSIS — Z95.810 PRESENCE OF AUTOMATIC (IMPLANTABLE) CARDIAC DEFIBRILLATOR: Chronic | ICD-10-CM

## 2021-10-21 DIAGNOSIS — Z95.1 PRESENCE OF AORTOCORONARY BYPASS GRAFT: Chronic | ICD-10-CM

## 2021-10-21 DIAGNOSIS — Z98.890 OTHER SPECIFIED POSTPROCEDURAL STATES: Chronic | ICD-10-CM

## 2021-10-21 LAB
ALBUMIN SERPL ELPH-MCNC: 4.6 G/DL
ALP BLD-CCNC: 45 U/L
ALT SERPL-CCNC: 18 U/L
ANION GAP SERPL CALC-SCNC: 12 MMOL/L
AST SERPL-CCNC: 20 U/L
BASOPHILS # BLD AUTO: 0.03 K/UL
BASOPHILS NFR BLD AUTO: 0.5 %
BILIRUB SERPL-MCNC: 0.5 MG/DL
BUN SERPL-MCNC: 16 MG/DL
CALCIUM SERPL-MCNC: 9.5 MG/DL
CHLORIDE SERPL-SCNC: 90 MMOL/L
CO2 SERPL-SCNC: 26 MMOL/L
CREAT SERPL-MCNC: 0.9 MG/DL
EOSINOPHIL # BLD AUTO: 0.03 K/UL
EOSINOPHIL NFR BLD AUTO: 0.5 %
GLUCOSE SERPL-MCNC: 105 MG/DL
HCT VFR BLD CALC: 35.6 %
HGB BLD-MCNC: 12.2 G/DL
IMM GRANULOCYTES NFR BLD AUTO: 0.3 %
LYMPHOCYTES # BLD AUTO: 2.29 K/UL
LYMPHOCYTES NFR BLD AUTO: 35.9 %
MAN DIFF?: NORMAL
MCHC RBC-ENTMCNC: 34.1 PG
MCHC RBC-ENTMCNC: 34.3 G/DL
MCV RBC AUTO: 99.4 FL
MONOCYTES # BLD AUTO: 0.72 K/UL
MONOCYTES NFR BLD AUTO: 11.3 %
NEUTROPHILS # BLD AUTO: 3.28 K/UL
NEUTROPHILS NFR BLD AUTO: 51.5 %
PLATELET # BLD AUTO: 270 K/UL
POTASSIUM SERPL-SCNC: 5.3 MMOL/L
PROT SERPL-MCNC: 7.1 G/DL
RBC # BLD: 3.58 M/UL
RBC # FLD: 12.2 %
SODIUM SERPL-SCNC: 128 MMOL/L
WBC # FLD AUTO: 6.37 K/UL

## 2021-10-21 PROCEDURE — 99214 OFFICE O/P EST MOD 30 MIN: CPT | Mod: GC

## 2021-10-21 RX ORDER — CALCIUM CITRATE 200 MG (950 MG) TABLET 200(950)MG
950 TABLET ORAL DAILY
Qty: 30 | Refills: 5 | Status: COMPLETED | COMMUNITY
Start: 2021-01-20 | End: 2021-10-21

## 2021-10-21 RX ORDER — INFLUENZA A VIRUS A/CALIFORNIA/7/2009 X-179A (H1N1) ANTIGEN (FORMALDEHYDE INACTIVATED), INFLUENZA A VIRUS A/HONG KONG/4801/2014 X-263B (H3N2) ANTIGEN (FORMALDEHYDE INACTIVATED), INFLUENZA B VIRUS B/PHUKET/3073/2013 ANTIGEN (FORMALDEHYDE INACTIVATED), AND INFLUENZA B VIRUS B/BRISBANE/60/2008 ANTIGEN (FORMALDEHYDE INACTIVATED) 15; 15; 15; 15 UG/.5ML; UG/.5ML; UG/.5ML; UG/.5ML
INJECTION, SUSPENSION INTRAMUSCULAR
Qty: 1 | Refills: 0 | Status: DISCONTINUED | COMMUNITY
Start: 2021-10-21 | End: 2021-10-21

## 2021-10-27 DIAGNOSIS — I10 ESSENTIAL (PRIMARY) HYPERTENSION: ICD-10-CM

## 2021-10-27 DIAGNOSIS — E78.5 HYPERLIPIDEMIA, UNSPECIFIED: ICD-10-CM

## 2021-10-27 DIAGNOSIS — I50.22 CHRONIC SYSTOLIC (CONGESTIVE) HEART FAILURE: ICD-10-CM

## 2021-10-27 DIAGNOSIS — Z00.00 ENCOUNTER FOR GENERAL ADULT MEDICAL EXAMINATION WITHOUT ABNORMAL FINDINGS: ICD-10-CM

## 2021-10-27 DIAGNOSIS — Z95.810 PRESENCE OF AUTOMATIC (IMPLANTABLE) CARDIAC DEFIBRILLATOR: ICD-10-CM

## 2021-10-27 DIAGNOSIS — E87.1 HYPO-OSMOLALITY AND HYPONATREMIA: ICD-10-CM

## 2021-10-27 DIAGNOSIS — I25.10 ATHEROSCLEROTIC HEART DISEASE OF NATIVE CORONARY ARTERY WITHOUT ANGINA PECTORIS: ICD-10-CM

## 2021-11-09 ENCOUNTER — APPOINTMENT (OUTPATIENT)
Dept: CARDIOLOGY | Facility: CLINIC | Age: 71
End: 2021-11-09
Payer: MEDICAID

## 2021-11-09 ENCOUNTER — NON-APPOINTMENT (OUTPATIENT)
Age: 71
End: 2021-11-09

## 2021-11-09 PROCEDURE — 93295 DEV INTERROG REMOTE 1/2/MLT: CPT | Mod: NC

## 2021-11-09 PROCEDURE — 93296 REM INTERROG EVL PM/IDS: CPT | Mod: NC

## 2021-11-14 NOTE — HISTORY OF PRESENT ILLNESS
[FreeTextEntry1] : 70 Yo F here for routine follow up.  [de-identified] : 72 yo F with CAD s/p CABG, systolic CHF with AICD, HFrEF 31%, HTN, HLD, BPPV, GERD and pre-DM presents for 3mo f/u. Patient has been compliant with her medication and follows with cardiology and EP.. Denies fever/chills, abdominal pain, n/v/d/c. She lives home with family, self ambulates. She has no complaints and feels well.

## 2021-11-14 NOTE — ASSESSMENT
[FreeTextEntry1] : 70 yo F with CAD s/p CABG, systolic CHF with AICD, HFrEF 31%, HTN, HLD, BPPV, GERD and pre-DM presents for routine follow up appointment. Patient has been compliant with her medication and follows with cardiology and EP, in april 2021. Denies fever/chills, abdominal pain, n/v/d/c. She lives home with family, self ambulates. She has no complaints and feels well. \par \par # hypotonic Hyponatremia \par - UOsm 349 SOsm 273 SNa 128\par - likely 2/2 HF/Diuretic use\par \par # CAD s/p CABG 7 years ago / HFrEF (31%) s/p AICD\par - nuclear study in 2019: showed reversible defect --> Cath done in Jan 2020 --> patent vessels---> optimize medical therapy\par - c/w meds per cardiology (ASA, lipitor, toprol xl, plavix, imdur, ranexa, spironolactone, chlorthalidone)\par - f/u with Cardiology (Dr. Oliva) and EP uptodate\par \par # HTN \par -controlled \par - c/w meds per cardiology \par \par # DLD\par - c/w statin \par \par # GERD\par - c/w famotidine q24\par \par # mild hypothyroidism\par - repeat TSH and T4 wnl\par - TSH elevated: 4.46 \par \par # Pre-diabetes\par - A1c 6.0 improve to 5.5 now\par - diet & exercise \par \par #osteopenia on DEXA \par - c/w Calcitrate 950 mg QD \par - repeat DEXA in 2-3 years \par \par # HCM\par - mammogram: July 2020, BiRads 1 \par - colonoscopy: done 2016, normal results \par - pap: > 5 years ago, deferred\par - Dexa July 2020: repeat in 2-3 years \par - PNA vaccine UTD\par - flu administered\par \par - RTc in 6 months w/ blood work 1 week prior \par \par \par

## 2021-11-14 NOTE — REVIEW OF SYSTEMS
[Cough] : cough [Fever] : no fever [Chills] : no chills [Night Sweats] : no night sweats [Discharge] : no discharge [Recent Change In Weight] : ~T no recent weight change [Pain] : no pain [Vision Problems] : no vision problems [Earache] : no earache [Hearing Loss] : no hearing loss [Nasal Discharge] : no nasal discharge [Chest Pain] : no chest pain [Palpitations] : no palpitations [Orthopnea] : no orthopnea [Paroxysmal Nocturnal Dyspnea] : no paroxysmal nocturnal dyspnea [Shortness Of Breath] : no shortness of breath [Wheezing] : no wheezing [Abdominal Pain] : no abdominal pain [Nausea] : no nausea [Heartburn] : no heartburn [Vomiting] : no vomiting [Melena] : no melena [Dysuria] : no dysuria [Incontinence] : no incontinence [Hematuria] : no hematuria [Frequency] : no frequency [Joint Pain] : no joint pain [Itching] : no itching [Muscle Pain] : no muscle pain [Skin Rash] : no skin rash [Headache] : no headache [Memory Loss] : no memory loss [Suicidal] : not suicidal [Easy Bleeding] : no easy bleeding

## 2022-01-06 ENCOUNTER — APPOINTMENT (OUTPATIENT)
Dept: CARDIOLOGY | Facility: CLINIC | Age: 72
End: 2022-01-06
Payer: MEDICAID

## 2022-01-06 PROCEDURE — 99213 OFFICE O/P EST LOW 20 MIN: CPT | Mod: 95

## 2022-01-06 NOTE — PROCEDURE
[No] : not [NSR] : normal sinus rhythm [See Scanned Paceart Report] : See scanned paceart report [See Device Printout] : See device printout [ICD] : Implantable cardioverter-defibrillator [VVI] : VVI [Impedance: ___ Ohms] : current cell impedance is [unfilled] Ohms [Charge Time: ___ sec] : charge time was [unfilled] seconds [Longevity: ___ months] : The estimated remaining battery life is [unfilled] months [Normal] : The battery status is normal. [Threshold Testing Performed] : Threshold testing was performed [Sensing Amplitude ___mv] : sensing amplitude was [unfilled] mv [Programmed for Longevity] : output reprogrammed for improved battery longevity [Counters Reset] : the counters were reset [Sense ___ %] : Sense [unfilled]% [Pace ___ %] : Pace [unfilled]% [de-identified] : [facility] [de-identified] : [id number] [de-identified] : [id number] [de-identified] : 09/08/2016 [de-identified] : 41 [de-identified] : No new episodes. CorVue is stable.

## 2022-01-06 NOTE — HISTORY OF PRESENT ILLNESS
[None] : The patient complains of no symptoms [Palpitations] : no palpitations [SOB] : no dyspnea [Syncope] : no syncope [Dizziness] : no dizziness [Chest Pain] : no chest pain or discomfort [Erythema at Site] : no erythema at device site [Swelling at Site] : no swelling at device site [de-identified] : Ms. ESTELITA RUFFIN has given me verbal authorization to provide the tele services\par Verbal consent given on 01/06/2022  by the patient.\par \par This visit was provided via telehealth using real-time 2-way audio visual technology. The patient,  Ms. ESTELITA RUFFIN,   was located at home,  [address] [place] [address]\[place] [address], at the time of the visit. \par \par The patient, Ms. ESTELITA RUFFIN  and Provider participated in the telehealth encounter. \par \par I have spent 21 minutes speaking with or face-to-face discussing\par \par \par The patient complains of no symptoms and dyspnea, no palpitations, no erythema at device site and no swelling at device site.\par I have reviewed with patient device interrogation. Interrogation showed normal device function.\par \par

## 2022-02-08 ENCOUNTER — NON-APPOINTMENT (OUTPATIENT)
Age: 72
End: 2022-02-08

## 2022-02-08 ENCOUNTER — APPOINTMENT (OUTPATIENT)
Dept: CARDIOLOGY | Facility: CLINIC | Age: 72
End: 2022-02-08
Payer: MEDICAID

## 2022-02-08 PROCEDURE — 93295 DEV INTERROG REMOTE 1/2/MLT: CPT

## 2022-02-08 PROCEDURE — 93296 REM INTERROG EVL PM/IDS: CPT

## 2022-02-22 ENCOUNTER — APPOINTMENT (OUTPATIENT)
Dept: CARDIOLOGY | Facility: CLINIC | Age: 72
End: 2022-02-22
Payer: MEDICAID

## 2022-02-22 ENCOUNTER — NON-APPOINTMENT (OUTPATIENT)
Age: 72
End: 2022-02-22

## 2022-02-22 ENCOUNTER — OUTPATIENT (OUTPATIENT)
Dept: OUTPATIENT SERVICES | Facility: HOSPITAL | Age: 72
LOS: 1 days | Discharge: HOME | End: 2022-02-22
Payer: MEDICAID

## 2022-02-22 VITALS
DIASTOLIC BLOOD PRESSURE: 80 MMHG | OXYGEN SATURATION: 98 % | BODY MASS INDEX: 32.02 KG/M2 | HEIGHT: 62 IN | TEMPERATURE: 96.8 F | WEIGHT: 174 LBS | HEART RATE: 67 BPM | SYSTOLIC BLOOD PRESSURE: 126 MMHG

## 2022-02-22 DIAGNOSIS — Z95.1 PRESENCE OF AORTOCORONARY BYPASS GRAFT: Chronic | ICD-10-CM

## 2022-02-22 DIAGNOSIS — I10 ESSENTIAL (PRIMARY) HYPERTENSION: ICD-10-CM

## 2022-02-22 DIAGNOSIS — E78.5 HYPERLIPIDEMIA, UNSPECIFIED: ICD-10-CM

## 2022-02-22 DIAGNOSIS — Z95.810 PRESENCE OF AUTOMATIC (IMPLANTABLE) CARDIAC DEFIBRILLATOR: ICD-10-CM

## 2022-02-22 DIAGNOSIS — I25.10 ATHEROSCLEROTIC HEART DISEASE OF NATIVE CORONARY ARTERY W/OUT ANGINA PECTORIS: ICD-10-CM

## 2022-02-22 DIAGNOSIS — I50.22 CHRONIC SYSTOLIC (CONGESTIVE) HEART FAILURE: ICD-10-CM

## 2022-02-22 DIAGNOSIS — I25.5 ISCHEMIC CARDIOMYOPATHY: ICD-10-CM

## 2022-02-22 DIAGNOSIS — Z95.810 PRESENCE OF AUTOMATIC (IMPLANTABLE) CARDIAC DEFIBRILLATOR: Chronic | ICD-10-CM

## 2022-02-22 DIAGNOSIS — I25.10 ATHEROSCLEROTIC HEART DISEASE OF NATIVE CORONARY ARTERY WITHOUT ANGINA PECTORIS: ICD-10-CM

## 2022-02-22 DIAGNOSIS — Z98.61 CORONARY ANGIOPLASTY STATUS: ICD-10-CM

## 2022-02-22 DIAGNOSIS — Z98.890 OTHER SPECIFIED POSTPROCEDURAL STATES: Chronic | ICD-10-CM

## 2022-02-22 PROCEDURE — 93010 ELECTROCARDIOGRAM REPORT: CPT

## 2022-02-22 PROCEDURE — 99214 OFFICE O/P EST MOD 30 MIN: CPT

## 2022-02-22 RX ORDER — FAMOTIDINE 20 MG/1
20 TABLET, FILM COATED ORAL
Qty: 30 | Refills: 3 | Status: DISCONTINUED | COMMUNITY
Start: 2019-11-25 | End: 2022-02-22

## 2022-02-22 NOTE — ASSESSMENT
[FreeTextEntry1] : 70y/o female with history of CAD s/p CABG x 4 (LIMA to LAD, SVG to D2, SVG to RCA, SVG to ramus/M2 in 2011), ischemic cardiomyopathy s/p AICD, HFrEF, HTN, DLD, presented for routine follow up.\par \par #Diffuse CAD s/p CABG\par # H/O HTN\par - last cath 1/2020 with diffuse CAD\par - c/w aspirin, Plavix and atorvastatin\par - c/w metoprolol and Ranolazine \par - c/w Isosorbide mononitrate 30 mg QD \par \par #CHFrEF s/p AICD. \par - clinically stable, denies symptoms \par - c/w Aldactone, chlorthalidone\par - c/w Entresto. Will monitor BMP with next medicine visit\par - c/w metoprolol\par - F/U with EP for device\par \par - Follow up in 6 months or as needed \par \par I was physically present for the key portions of the evaluation and management (E/M) service provided.  I agree with the above history, physical, and plan which I have reviewed and edited where appropriate.  This was reviewed with the cardiology fellow (Dr. Fernandez).\par \par Number/complexity of problems - Mod - 2 or more chronic stable illnesses\par Amount/complexity of data -history, exam, reviewed old note, labs reviewed, ekg reviewed\par Risk of complications - Mod\par \par \par \par \par

## 2022-02-22 NOTE — REVIEW OF SYSTEMS
[Fever] : no fever [Chills] : no chills [Blurry Vision] : no blurred vision [Earache] : no earache [Sore Throat] : no sore throat [SOB] : no shortness of breath [Dyspnea on exertion] : not dyspnea during exertion [Chest Discomfort] : no chest discomfort [Leg Claudication] : no intermittent leg claudication [Palpitations] : no palpitations [Orthopnea] : no orthopnea [Cough] : no cough [Wheezing] : no wheezing [Abdominal Pain] : no abdominal pain [Change in Appetite] : no change in appetite [Dysuria] : no dysuria [Joint Pain] : no joint pain [Myalgia] : no myalgia [Rash] : no rash

## 2022-02-22 NOTE — PHYSICAL EXAM
[General Appearance - Well Developed] : well developed [Normal Appearance] : normal appearance [Well Groomed] : well groomed [General Appearance - Well Nourished] : well nourished [No Deformities] : no deformities [General Appearance - In No Acute Distress] : no acute distress [Normal Conjunctiva] : the conjunctiva exhibited no abnormalities [Eyelids - No Xanthelasma] : the eyelids demonstrated no xanthelasmas [Normal Oral Mucosa] : normal oral mucosa [No Oral Pallor] : no oral pallor [No Oral Cyanosis] : no oral cyanosis [Normal Jugular Venous A Waves Present] : normal jugular venous A waves present [Normal Jugular Venous V Waves Present] : normal jugular venous V waves present [No Jugular Venous Carver A Waves] : no jugular venous carver A waves [Respiration, Rhythm And Depth] : normal respiratory rhythm and effort [Exaggerated Use Of Accessory Muscles For Inspiration] : no accessory muscle use [Auscultation Breath Sounds / Voice Sounds] : lungs were clear to auscultation bilaterally [Heart Rate And Rhythm] : heart rate and rhythm were normal [Heart Sounds] : normal S1 and S2 [Murmurs] : no murmurs present [Abdomen Soft] : soft [Abdomen Tenderness] : non-tender [Abdomen Mass (___ Cm)] : no abdominal mass palpated [Gait - Sufficient For Exercise Testing] : the gait was sufficient for exercise testing [Abnormal Walk] : normal gait [Nail Clubbing] : no clubbing of the fingernails [Cyanosis, Localized] : no localized cyanosis [Petechial Hemorrhages (___cm)] : no petechial hemorrhages [Skin Color & Pigmentation] : normal skin color and pigmentation [] : no rash [No Venous Stasis] : no venous stasis [Skin Lesions] : no skin lesions [No Skin Ulcers] : no skin ulcer [No Xanthoma] : no  xanthoma was observed

## 2022-04-13 NOTE — H&P PST ADULT - NEUROLOGICAL
Addended by: IAM LÓPEZ on: 4/13/2022 04:28 PM     Modules accepted: Orders    
Alert & oriented; no sensory, motor or coordination deficits, normal reflexes

## 2022-04-21 ENCOUNTER — APPOINTMENT (OUTPATIENT)
Dept: INTERNAL MEDICINE | Facility: CLINIC | Age: 72
End: 2022-04-21

## 2022-05-10 ENCOUNTER — APPOINTMENT (OUTPATIENT)
Dept: CARDIOLOGY | Facility: CLINIC | Age: 72
End: 2022-05-10

## 2022-05-10 ENCOUNTER — FORM ENCOUNTER (OUTPATIENT)
Age: 72
End: 2022-05-10

## 2022-06-14 ENCOUNTER — APPOINTMENT (OUTPATIENT)
Dept: CARDIOLOGY | Facility: CLINIC | Age: 72
End: 2022-06-14

## 2022-06-15 ENCOUNTER — FORM ENCOUNTER (OUTPATIENT)
Age: 72
End: 2022-06-15

## 2022-07-13 ENCOUNTER — APPOINTMENT (OUTPATIENT)
Dept: CARDIOLOGY | Facility: CLINIC | Age: 72
End: 2022-07-13

## 2022-07-13 ENCOUNTER — NON-APPOINTMENT (OUTPATIENT)
Age: 72
End: 2022-07-13

## 2022-07-13 PROCEDURE — 93295 DEV INTERROG REMOTE 1/2/MLT: CPT

## 2022-07-13 PROCEDURE — 93296 REM INTERROG EVL PM/IDS: CPT

## 2022-07-31 ENCOUNTER — INPATIENT (INPATIENT)
Facility: HOSPITAL | Age: 72
LOS: 1 days | Discharge: HOME | End: 2022-08-02
Attending: INTERNAL MEDICINE | Admitting: INTERNAL MEDICINE

## 2022-07-31 VITALS
RESPIRATION RATE: 20 BRPM | HEART RATE: 50 BPM | OXYGEN SATURATION: 97 % | HEIGHT: 62 IN | WEIGHT: 164.91 LBS | DIASTOLIC BLOOD PRESSURE: 59 MMHG | SYSTOLIC BLOOD PRESSURE: 121 MMHG | TEMPERATURE: 97 F

## 2022-07-31 DIAGNOSIS — Z95.1 PRESENCE OF AORTOCORONARY BYPASS GRAFT: Chronic | ICD-10-CM

## 2022-07-31 DIAGNOSIS — Z98.890 OTHER SPECIFIED POSTPROCEDURAL STATES: Chronic | ICD-10-CM

## 2022-07-31 DIAGNOSIS — Z95.810 PRESENCE OF AUTOMATIC (IMPLANTABLE) CARDIAC DEFIBRILLATOR: Chronic | ICD-10-CM

## 2022-07-31 LAB
ALBUMIN SERPL ELPH-MCNC: 4.6 G/DL — SIGNIFICANT CHANGE UP (ref 3.5–5.2)
ALP SERPL-CCNC: 73 U/L — SIGNIFICANT CHANGE UP (ref 30–115)
ALT FLD-CCNC: 21 U/L — SIGNIFICANT CHANGE UP (ref 0–41)
ANION GAP SERPL CALC-SCNC: 14 MMOL/L — SIGNIFICANT CHANGE UP (ref 7–14)
AST SERPL-CCNC: 21 U/L — SIGNIFICANT CHANGE UP (ref 0–41)
BASOPHILS # BLD AUTO: 0.02 K/UL — SIGNIFICANT CHANGE UP (ref 0–0.2)
BASOPHILS NFR BLD AUTO: 0.3 % — SIGNIFICANT CHANGE UP (ref 0–1)
BILIRUB SERPL-MCNC: 0.4 MG/DL — SIGNIFICANT CHANGE UP (ref 0.2–1.2)
BUN SERPL-MCNC: 17 MG/DL — SIGNIFICANT CHANGE UP (ref 10–20)
CALCIUM SERPL-MCNC: 9.6 MG/DL — SIGNIFICANT CHANGE UP (ref 8.5–10.1)
CHLORIDE SERPL-SCNC: 91 MMOL/L — LOW (ref 98–110)
CO2 SERPL-SCNC: 22 MMOL/L — SIGNIFICANT CHANGE UP (ref 17–32)
CREAT SERPL-MCNC: 0.8 MG/DL — SIGNIFICANT CHANGE UP (ref 0.7–1.5)
EGFR: 79 ML/MIN/1.73M2 — SIGNIFICANT CHANGE UP
EOSINOPHIL # BLD AUTO: 0.02 K/UL — SIGNIFICANT CHANGE UP (ref 0–0.7)
EOSINOPHIL NFR BLD AUTO: 0.3 % — SIGNIFICANT CHANGE UP (ref 0–8)
GLUCOSE SERPL-MCNC: 121 MG/DL — HIGH (ref 70–99)
HCT VFR BLD CALC: 32.7 % — LOW (ref 37–47)
HGB BLD-MCNC: 12.2 G/DL — SIGNIFICANT CHANGE UP (ref 12–16)
IMM GRANULOCYTES NFR BLD AUTO: 0.7 % — HIGH (ref 0.1–0.3)
LACTATE SERPL-SCNC: 1.9 MMOL/L — SIGNIFICANT CHANGE UP (ref 0.7–2)
LIDOCAIN IGE QN: 27 U/L — SIGNIFICANT CHANGE UP (ref 7–60)
LYMPHOCYTES # BLD AUTO: 1.68 K/UL — SIGNIFICANT CHANGE UP (ref 1.2–3.4)
LYMPHOCYTES # BLD AUTO: 23.3 % — SIGNIFICANT CHANGE UP (ref 20.5–51.1)
MAGNESIUM SERPL-MCNC: 1.7 MG/DL — LOW (ref 1.8–2.4)
MCHC RBC-ENTMCNC: 34.8 PG — HIGH (ref 27–31)
MCHC RBC-ENTMCNC: 37.3 G/DL — HIGH (ref 32–37)
MCV RBC AUTO: 93.2 FL — SIGNIFICANT CHANGE UP (ref 81–99)
MONOCYTES # BLD AUTO: 0.74 K/UL — HIGH (ref 0.1–0.6)
MONOCYTES NFR BLD AUTO: 10.2 % — HIGH (ref 1.7–9.3)
NEUTROPHILS # BLD AUTO: 4.71 K/UL — SIGNIFICANT CHANGE UP (ref 1.4–6.5)
NEUTROPHILS NFR BLD AUTO: 65.2 % — SIGNIFICANT CHANGE UP (ref 42.2–75.2)
NRBC # BLD: 0 /100 WBCS — SIGNIFICANT CHANGE UP (ref 0–0)
NT-PROBNP SERPL-SCNC: 492 PG/ML — HIGH (ref 0–300)
PLATELET # BLD AUTO: 254 K/UL — SIGNIFICANT CHANGE UP (ref 130–400)
POTASSIUM SERPL-MCNC: 5 MMOL/L — SIGNIFICANT CHANGE UP (ref 3.5–5)
POTASSIUM SERPL-SCNC: 5 MMOL/L — SIGNIFICANT CHANGE UP (ref 3.5–5)
PROT SERPL-MCNC: 7 G/DL — SIGNIFICANT CHANGE UP (ref 6–8)
RBC # BLD: 3.51 M/UL — LOW (ref 4.2–5.4)
RBC # FLD: 12.4 % — SIGNIFICANT CHANGE UP (ref 11.5–14.5)
SARS-COV-2 RNA SPEC QL NAA+PROBE: SIGNIFICANT CHANGE UP
SODIUM SERPL-SCNC: 127 MMOL/L — LOW (ref 135–146)
TROPONIN T SERPL-MCNC: <0.01 NG/ML — SIGNIFICANT CHANGE UP
WBC # BLD: 7.22 K/UL — SIGNIFICANT CHANGE UP (ref 4.8–10.8)
WBC # FLD AUTO: 7.22 K/UL — SIGNIFICANT CHANGE UP (ref 4.8–10.8)

## 2022-07-31 PROCEDURE — 99223 1ST HOSP IP/OBS HIGH 75: CPT

## 2022-07-31 PROCEDURE — 70450 CT HEAD/BRAIN W/O DYE: CPT | Mod: 26,MA

## 2022-07-31 PROCEDURE — 93010 ELECTROCARDIOGRAM REPORT: CPT | Mod: 76

## 2022-07-31 PROCEDURE — 74177 CT ABD & PELVIS W/CONTRAST: CPT | Mod: 26,MA

## 2022-07-31 PROCEDURE — 99285 EMERGENCY DEPT VISIT HI MDM: CPT

## 2022-07-31 PROCEDURE — 71045 X-RAY EXAM CHEST 1 VIEW: CPT | Mod: 26

## 2022-07-31 PROCEDURE — 93010 ELECTROCARDIOGRAM REPORT: CPT

## 2022-07-31 RX ORDER — ATORVASTATIN CALCIUM 80 MG/1
1 TABLET, FILM COATED ORAL
Qty: 0 | Refills: 0 | DISCHARGE

## 2022-07-31 RX ORDER — SACUBITRIL AND VALSARTAN 24; 26 MG/1; MG/1
1 TABLET, FILM COATED ORAL
Refills: 0 | Status: DISCONTINUED | OUTPATIENT
Start: 2022-07-31 | End: 2022-08-02

## 2022-07-31 RX ORDER — METOPROLOL TARTRATE 50 MG
100 TABLET ORAL DAILY
Refills: 0 | Status: DISCONTINUED | OUTPATIENT
Start: 2022-07-31 | End: 2022-08-02

## 2022-07-31 RX ORDER — MAGNESIUM SULFATE 500 MG/ML
2 VIAL (ML) INJECTION ONCE
Refills: 0 | Status: COMPLETED | OUTPATIENT
Start: 2022-07-31 | End: 2022-07-31

## 2022-07-31 RX ORDER — RANOLAZINE 500 MG/1
1000 TABLET, FILM COATED, EXTENDED RELEASE ORAL
Refills: 0 | Status: DISCONTINUED | OUTPATIENT
Start: 2022-07-31 | End: 2022-08-02

## 2022-07-31 RX ORDER — ISOSORBIDE MONONITRATE 60 MG/1
90 TABLET, EXTENDED RELEASE ORAL DAILY
Refills: 0 | Status: DISCONTINUED | OUTPATIENT
Start: 2022-07-31 | End: 2022-08-02

## 2022-07-31 RX ORDER — LOSARTAN POTASSIUM 100 MG/1
1 TABLET, FILM COATED ORAL
Qty: 0 | Refills: 0 | DISCHARGE

## 2022-07-31 RX ORDER — CLOPIDOGREL BISULFATE 75 MG/1
75 TABLET, FILM COATED ORAL DAILY
Refills: 0 | Status: DISCONTINUED | OUTPATIENT
Start: 2022-07-31 | End: 2022-08-02

## 2022-07-31 RX ORDER — ATORVASTATIN CALCIUM 80 MG/1
40 TABLET, FILM COATED ORAL DAILY
Refills: 0 | Status: DISCONTINUED | OUTPATIENT
Start: 2022-07-31 | End: 2022-08-02

## 2022-07-31 RX ORDER — FAMOTIDINE 10 MG/ML
20 INJECTION INTRAVENOUS
Refills: 0 | Status: DISCONTINUED | OUTPATIENT
Start: 2022-07-31 | End: 2022-08-02

## 2022-07-31 RX ORDER — ENOXAPARIN SODIUM 100 MG/ML
40 INJECTION SUBCUTANEOUS EVERY 24 HOURS
Refills: 0 | Status: DISCONTINUED | OUTPATIENT
Start: 2022-07-31 | End: 2022-08-02

## 2022-07-31 RX ORDER — ISOSORBIDE MONONITRATE 60 MG/1
1 TABLET, EXTENDED RELEASE ORAL
Qty: 0 | Refills: 0 | DISCHARGE

## 2022-07-31 RX ORDER — SODIUM CHLORIDE 9 MG/ML
1000 INJECTION INTRAMUSCULAR; INTRAVENOUS; SUBCUTANEOUS ONCE
Refills: 0 | Status: COMPLETED | OUTPATIENT
Start: 2022-07-31 | End: 2022-07-31

## 2022-07-31 RX ORDER — ASPIRIN/CALCIUM CARB/MAGNESIUM 324 MG
81 TABLET ORAL DAILY
Refills: 0 | Status: DISCONTINUED | OUTPATIENT
Start: 2022-07-31 | End: 2022-08-02

## 2022-07-31 RX ORDER — RANOLAZINE 500 MG/1
1 TABLET, FILM COATED, EXTENDED RELEASE ORAL
Qty: 0 | Refills: 0 | DISCHARGE

## 2022-07-31 RX ORDER — FAMOTIDINE 10 MG/ML
0 INJECTION INTRAVENOUS
Qty: 0 | Refills: 0 | DISCHARGE

## 2022-07-31 RX ORDER — SPIRONOLACTONE 25 MG/1
25 TABLET, FILM COATED ORAL DAILY
Refills: 0 | Status: DISCONTINUED | OUTPATIENT
Start: 2022-07-31 | End: 2022-08-02

## 2022-07-31 RX ADMIN — Medication 25 GRAM(S): at 22:51

## 2022-07-31 RX ADMIN — SODIUM CHLORIDE 2000 MILLILITER(S): 9 INJECTION INTRAMUSCULAR; INTRAVENOUS; SUBCUTANEOUS at 20:12

## 2022-07-31 RX ADMIN — Medication 25 GRAM(S): at 20:39

## 2022-07-31 NOTE — ED PROVIDER NOTE - NS ED ATTENDING STATEMENT MOD
This was a shared visit with the OLIVER. I reviewed and verified the documentation and independently performed the documented:

## 2022-07-31 NOTE — ED PROVIDER NOTE - OBJECTIVE STATEMENT
72-year-old female with past medical history of hypertension, hyperlipidemia, CAD with CABG and AICD who presents with syncope episode.  Per family, patient was eating food and started having abdominal pain all of a sudden and had a 1 episode of vomiting and patient syncopized.  Per daughter, patient passed out for almost a minute and regain her consciousness.  Reports that when patient passed out, she lost her bowel control and defecated herself.  Family also noticed that when patient passed out she was clenching her jaw.  Denies history of seizure.  Denies recent head injury or trauma or recent sickness.  Patient sees a cardiologist and was last seen several months ago.  Last time patient had stress test was few years ago and patient was unable to tolerate the stress test.  Patient also endorses dysuria a few days ago.  At the time of this interview, patient endorses general weakness, but denies fever, shortness of breath, chest pain, nausea, vomiting, abdominal pain, melena, and hematochezia.

## 2022-07-31 NOTE — H&P ADULT - NSHPPHYSICALEXAM_GEN_ALL_CORE
LOS:     VITALS:   T(C): 36.1 (07-31-22 @ 16:24), Max: 36.1 (07-31-22 @ 16:24)  HR: 80 (07-31-22 @ 21:31) (50 - 80)  BP: 119/58 (07-31-22 @ 21:31) (119/58 - 121/59)  RR: 19 (07-31-22 @ 21:31) (19 - 20)  SpO2: 98% (07-31-22 @ 21:31) (97% - 98%)    GENERAL: NAD, lying in bed comfortably  HEAD:  Atraumatic, Normocephalic  EYES: EOMI, PERRLA, conjunctiva and sclera clear  ENT: Moist mucous membranes  NECK: Supple, No JVD  CHEST/LUNG: Clear to auscultation bilaterally; No rales, rhonchi, wheezing, or rubs. Unlabored respirations  HEART: Regular rate and rhythm; No murmurs, rubs, or gallops  ABDOMEN: BSx4; Soft, nontender, nondistended  EXTREMITIES:  2+ Peripheral Pulses, brisk capillary refill. No clubbing, cyanosis, or edema  NERVOUS SYSTEM:  A&Ox3, no focal deficits   SKIN: No rashes or lesions

## 2022-07-31 NOTE — ED PROVIDER NOTE - CLINICAL SUMMARY MEDICAL DECISION MAKING FREE TEXT BOX
71 y/o F presented to ED with syncopal episode, abdominal pain and vomiting today. Labwork unremarkable including negative troponin. EKG non-ischemic with prolonged QTc, given magnesium. CTH unremarkable. CT A/P imaging showing no evidence of acute/inflammatory process in the abdomen or pelvis. Pt hemodynamically stable. I have fully discussed the medical management and delivery of care with the patient. I have discussed any available labs, imaging and treatment options with the patient.  Pt admitted to telemetry for further care & management.

## 2022-07-31 NOTE — H&P ADULT - ASSESSMENT
72-year-old female with past medical history of hypertension, hyperlipidemia, CAD with CABG and AICD who presents with syncopal episode.  Per family, patient was eating food and started having abdominal pain all of a sudden and had a 1 episode of vomiting and patient syncopized.    #Syncope 2/2 neuro mediated vs orthostatic vs cardiovascular  - WBC 7.22, Mag 1.7, lactate 1.9  - Trops .01   - Pro-  - EKG shows 1st degree AV block   - CT AB/P show No evidence of acute/inflammatory process in the abdomen or pelvis. A 2 mm nodule within the right lower lobe.   - CT H shows Bilateral subcortical and periventricular white matter hypodensities, which likely represent chronic microvascular changes. No acute intracranial hemorrhage, mass-effect or midline shift.  - Given normal lactate and normal WBC count seizure less likely but given incontinence and jaw clenching with postictal state of weakness but will do w/u to r/o possibility of seizure   - Admit to tele   - check monitor for arrhythmic events  - Check orthostatic vs  - F/u ECHO with bubble study   - F/u carotid artery duplex   - F/u EPS eval for device interrogation   - F/u EEG  - F/u Neuro eval  72-year-old female with past medical history of hypertension, hyperlipidemia, CAD s/p CABG and AICD who presents with syncopal episode.  Per family, patient was eating food and started having abdominal pain all of a sudden and had a 1 episode of vomiting and patient syncopized. No head trauma, +LOC, -AC.     #Syncope 2/2 neuro mediated vs orthostatic vs cardiovascular  - No head trauma, +LOC, -AC.   - WBC 7.22, Mag 1.7, lactate 1.9  - Trops .01   - Pro-  - EKG shows 1st degree AV block   - CT AB/P show No evidence of acute/inflammatory process in the abdomen or pelvis. A 2 mm nodule within the right lower lobe.   - CT H shows Bilateral subcortical and periventricular white matter hypodensities, which likely represent chronic microvascular changes. No acute intracranial hemorrhage, mass-effect or midline shift.  - Given normal lactate and normal WBC count seizure less likely but given incontinence and jaw clenching with postictal state of weakness but will do w/u to r/o possibility of seizure   - Admit to tele   - check monitor for arrhythmic events  - Check orthostatic vs  - F/u ECHO with bubble study   - F/u carotid artery duplex   - F/u EPS eval for device interrogation   - F/u EEG  - F/u Neuro eval     #CAD s/p CABG and AICD   - c/w home meds     #HLD   - F/u lipid panel   - c/w statin     #Misc   - DVT prophylaxis: Lovenox   - GI prophylaxis: Famotidine   - Diet: DASH   - Activity: IAT   - Disposition: admit to medicine  72-year-old female with past medical history of hypertension, hyperlipidemia, CAD s/p CABG and AICD who presents with syncopal episode.  Per family, patient was eating food and started having abdominal pain all of a sudden and had a 1 episode of vomiting and patient syncopized. No head trauma, +LOC, -AC.     #Syncope 2/2 neuro mediated vs orthostatic vs cardiovascular  - No head trauma, +LOC, -AC.   - WBC 7.22, Mag 1.7, lactate 1.9  - Trops .01   - Pro-  - EKG shows 1st degree AV block   - CT AB/P show No evidence of acute/inflammatory process in the abdomen or pelvis. A 2 mm nodule within the right lower lobe.   - CT H shows Bilateral subcortical and periventricular white matter hypodensities, which likely represent chronic microvascular changes. No acute intracranial hemorrhage, mass-effect or midline shift.  - Given normal lactate and normal WBC count seizure less likely but given incontinence and jaw clenching with postictal state of weakness but will do w/u to r/o possibility of seizure   - Admit to tele   - check monitor for arrhythmic events  - Check orthostatic vs  - F/u ECHO with bubble study   - F/u carotid artery duplex   - F/u EPS eval for device interrogation   - F/u EEG  - F/u Neuro eval     #CAD s/p CABG   #HFrEF s/p AICD   - c/w home meds  - Fluid restriction   - strict Is/Os   - Daily weight   - F/u ECHO     #HLD   - F/u lipid panel   - c/w statin     #Misc   - DVT prophylaxis: Lovenox   - GI prophylaxis: Famotidine   - Diet: DASH   - Activity: IAT   - Disposition: admit to medicine

## 2022-07-31 NOTE — H&P ADULT - HISTORY OF PRESENT ILLNESS
72-year-old female with past medical history of hypertension, hyperlipidemia, CAD with CABG and AICD who presents with syncopal episode.  Per family, patient was eating food and started having abdominal pain all of a sudden and had a 1 episode of vomiting and patient syncopized.  Per daughter, patient passed out for almost a minute and regain her consciousness.  Reports that when patient passed out, she lost her bowel control and defecated herself.  Family also noticed that when patient passed out she was clenching her jaw.  Denies history of seizure.  Denies recent head injury or trauma or recent sickness.  Patient sees a cardiologist and was last seen several months ago.  Last time patient had stress test was few years ago and patient was unable to tolerate the stress test.  Patient also endorses dysuria a few days ago.  At the time of this interview, patient endorses general weakness, but denies fever, shortness of breath, chest pain, nausea, vomiting, abdominal pain, melena, and hematochezia.    ED vitals T(F): 97, HR: 80, BP: 119/58, RR: 19, SpO2: 98%   Labs show HB 12.2, HCT 32.7, , WBC 7.22, Mag 1.7, lactate 1.9, Trops .01, Pro-, EKG shows 1st degree AV block     127<L>  |  91<L>  |  17  ----------------------------<  121<H>  5.0   |  22  |  0.8    CT AB/P show No evidence of acute/inflammatory process in the abdomen or pelvis. A 2 mm nodule within the right lower lobe.   CT H shows Bilateral subcortical and periventricular white matter hypodensities, which likely represent chronic microvascular changes. No acute intracranial hemorrhage, mass-effect or midline shift.     72-year-old female with past medical history of hypertension, hyperlipidemia, CAD with CABG and AICD who presents with syncopal episode.  Per family, patient was eating food and started having abdominal pain all of a sudden and had a 1 episode of vomiting and patient syncopized.  Per daughter, patient passed out for almost a min and regained her consciousness.  Reports that when patient passed out, she had an episode of bowel incontinence and was clenching her jaw.  Denies history of seizure but did have a previous episode of syncope about 2-3 years ago. Denies recent head injury or trauma or recent sickness.  Last time patient had stress test was few years ago and patient was unable to tolerate the stress test. Patient was endorsing mild generalized weakness after the episode which has now resolved. At the time of this interview, patient denies any headache, dizziness, fever or chills. Patient denies any chest pain, palpitation or shortness of breath. Patient currently denies any nausea, vomiting, abdominal pain, melena, or hematochezia.    ED vitals T(F): 97, HR: 80, BP: 119/58, RR: 19, SpO2: 98%   Labs show HB 12.2, HCT 32.7, , WBC 7.22, Mag 1.7, lactate 1.9, Trops .01, Pro-, EKG shows 1st degree AV block     127<L>  |  91<L>  |  17  ----------------------------<  121<H>  5.0   |  22  |  0.8    CT AB/P show No evidence of acute/inflammatory process in the abdomen or pelvis. A 2 mm nodule within the right lower lobe.   CT H shows Bilateral subcortical and periventricular white matter hypodensities, which likely represent chronic microvascular changes. No acute intracranial hemorrhage, mass-effect or midline shift.

## 2022-07-31 NOTE — ED PROVIDER NOTE - PHYSICAL EXAMINATION
CONSTITUTIONAL: in no apparent distress.   HEAD: Normocephalic; atraumatic.   EYES: Pupils are round and reactive, extra-ocular muscles are intact. Eyelids are normal in appearance without swelling or lesions.   ENT: Hearing is intact with good acuity to spoken voice. Patient is speaking clearly, not muffled and airway is intact.   RESPIRATORY: No signs of respiratory distress. Lung sounds are clear in all lobes bilaterally without rales, rhonchi, or wheezes.  CARDIOVASCULAR: Regular rate and rhythm.   GI: Abdomen is soft, non-tender, and without distention. Bowel sounds are present and normoactive in all four quadrants. No masses are noted.   NEURO: A & O x 3. Normal speech.   PSYCHOLOGICAL: Appropriate mood and affect. Good judgement and insight.

## 2022-07-31 NOTE — ED PROVIDER NOTE - ATTENDING APP SHARED VISIT CONTRIBUTION OF CARE
72yF HTN DLD CAD s/p CABG and AICD p/w syncope - pt vomited during dinner and then passed out.  Family witnessed the event and estimates ~30sec LOC with ?tongue biting and urinary incontinence.  One prior syncopal episode as per family - occurred ~3-4yr ago and pt was discharged from ED w/o incident.

## 2022-07-31 NOTE — ED PROVIDER NOTE - NS ED ROS FT
Constitutional: + general weakness. Negative for fever, chills, and fatigue.  HENT: Negative for headache  Eyes: Negative for eye pain, and vision change.  Cardiovascular: Negative for chest pain, and palpitation.  Respiratory: Negative for SOB, wheezing, cough and sputum production.  Gastrointestinal: + abd pain. Negative for nausea, vomiting, constipation, diarrhea, hematochezia, and melena.  Genitourinary: + dysuria. Negative for flank pain, frequency, and hematuria.  Neurological: + syncope. Negative for dizziness, focal weakness, numbness, and loss of consciousness.  Musculoskeletal: Negative for joint swelling, arthralgias, back pain, neck pain, and calf cramps.  Hematological: Does not bruise/bleed easily.

## 2022-07-31 NOTE — H&P ADULT - NSICDXPASTSURGICALHX_GEN_ALL_CORE_FT
PAST SURGICAL HISTORY:  AICD (automatic cardioverter/defibrillator) present     History of left heart catheterization (LHC) MULTIPLE    S/P CABG (coronary artery bypass graft) 2011 [facility]

## 2022-07-31 NOTE — ED ADULT TRIAGE NOTE - CHIEF COMPLAINT QUOTE
pt biba from home, c/o possible syncopal episode after eating, was given Nitro x 3 by family pta though pt denied chest pain

## 2022-07-31 NOTE — H&P ADULT - ATTENDING COMMENTS
HPI:  72-year-old female with past medical history of hypertension, hyperlipidemia, CAD with CABG and AICD who presents with syncopal episode.  Per family, patient was eating food and started having abdominal pain all of a sudden and had a 1 episode of vomiting and patient syncopized.  Per daughter, patient passed out for almost a minute and regain her consciousness.  Reports that when patient passed out, she lost her bowel control and defecated herself.  Family also noticed that when patient passed out she was clenching her jaw.  Denies history of seizure.  Denies recent head injury or trauma or recent sickness.  Patient sees a cardiologist and was last seen several months ago.  Last time patient had stress test was few years ago and patient was unable to tolerate the stress test.  Patient also endorses dysuria a few days ago.  At the time of this interview, patient endorses general weakness, but denies fever, shortness of breath, chest pain, nausea, vomiting, abdominal pain, melena, and hematochezia.    ED vitals T(F): 97, HR: 80, BP: 119/58, RR: 19, SpO2: 98%   Labs show HB 12.2, HCT 32.7, , WBC 7.22, Mag 1.7, lactate 1.9, Trops .01, Pro-, EKG shows 1st degree AV block     127<L>  |  91<L>  |  17  ----------------------------<  121<H>  5.0   |  22  |  0.8    CT AB/P show No evidence of acute/inflammatory process in the abdomen or pelvis. A 2 mm nodule within the right lower lobe.   CT H shows Bilateral subcortical and periventricular white matter hypodensities, which likely represent chronic microvascular changes. No acute intracranial hemorrhage, mass-effect or midline shift.     (31 Jul 2022 22:36)    REVIEW OF SYSTEMS: see cc/HPI   CONSTITUTIONAL: No weakness, fevers or chills  EYES/ENT: No visual changes;  No vertigo or throat pain   NECK: No pain or stiffness  RESPIRATORY: No cough, wheezing, hemoptysis; No shortness of breath  CARDIOVASCULAR: No chest pain or palpitations  GASTROINTESTINAL: No abdominal or epigastric pain. No nausea, vomiting, or hematemesis; No diarrhea or constipation. No melena or hematochezia.  GENITOURINARY: No dysuria, frequency or hematuria  NEUROLOGICAL: No numbness or weakness  SKIN: No itching, rashes    Physical Exam:  General: WN/WD NAD  Neurology: A&Ox3, nonfocal, follows commands  Eyes: PERRLA/ EOMI  ENT/Neck: Neck supple, trachea midline, No JVD  Respiratory: CTA B/L, No wheezing, rales, rhonchi  CV: Normal rate regular rhythm, S1S2, no murmurs, rubs or gallops  Abdominal: Soft, NT, ND +BS,   Extremities: No edema, + peripheral pulses  Skin: No Rashes, Hematoma, Ecchymosis  Incisions:   Tubes: HPI:  72-year-old female with past medical history of hypertension, hyperlipidemia, CAD with CABG and AICD who presents with syncopal episode.  Per family, patient was eating food and started having abdominal pain all of a sudden and had a 1 episode of vomiting and patient syncopized.  Per daughter, patient passed out for almost a minute and regain her consciousness.  Reports that when patient passed out, she lost her bowel control and defecated herself.  Family also noticed that when patient passed out she was clenching her jaw.  Denies history of seizure.  Denies recent head injury or trauma or recent sickness.  Patient sees a cardiologist and was last seen several months ago.  Last time patient had stress test was few years ago and patient was unable to tolerate the stress test.  Patient also endorses dysuria a few days ago.  At the time of this interview, patient endorses general weakness, but denies fever, shortness of breath, chest pain, nausea, vomiting, abdominal pain, melena, and hematochezia.    ED vitals T(F): 97, HR: 80, BP: 119/58, RR: 19, SpO2: 98%   Labs show HB 12.2, HCT 32.7, , WBC 7.22, Mag 1.7, lactate 1.9, Trops .01, Pro-, EKG shows 1st degree AV block     127<L>  |  91<L>  |  17  ----------------------------<  121<H>  5.0   |  22  |  0.8    CT AB/P show No evidence of acute/inflammatory process in the abdomen or pelvis. A 2 mm nodule within the right lower lobe.   CT H shows Bilateral subcortical and periventricular white matter hypodensities, which likely represent chronic microvascular changes. No acute intracranial hemorrhage, mass-effect or midline shift.     (31 Jul 2022 22:36)    ADDITIONAL HISTORY - DAUGHTER AVAILABLE FOR INTERVIEW via Phone ( she witnessed event)  HER DAUGHTER GAVE A HISTORY OF A PREVIOUS EPISODE OF LOC W/ FECAL AND URINARY INCONTINENCE THAT OCCURRED ABOUT 2 YEARS AGO. THE PATIENT WAS HAVING A CONCURRENT FEBRILE ILLNESS ( NOT COVID AS PER DAUGHTER). NO HISTORY OF AICD FIRING DURING TODAY'S EVENT.      REVIEW OF SYSTEMS: see cc/HPI   CONSTITUTIONAL: No weakness, fevers or chills  EYES/ENT: No visual changes;  No vertigo or throat pain   NECK: No pain or stiffness  RESPIRATORY: No cough, wheezing, hemoptysis; No shortness of breath  CARDIOVASCULAR: No chest pain or palpitations  GASTROINTESTINAL: No abdominal or epigastric pain. No nausea, vomiting, or hematemesis; No diarrhea or constipation. No melena or hematochezia.  GENITOURINARY: No dysuria, frequency or hematuria  NEUROLOGICAL: No numbness or weakness, (+) LOC, (+) fecal and urinary incontinence (+) jaw clenching   SKIN: No itching, rashes    Physical Exam:  General: WN/WD NAD  Neurology: A&Ox3, nonfocal, follows commands  Eyes: PERRLA/ EOMI  ENT/Neck: Neck supple, trachea midline, No JVD  Respiratory: CTA B/L, No wheezing, rales, rhonchi  CV: Normal rate regular rhythm, S1S2, no murmurs, rubs or gallops  Abdominal: Soft, NT, ND +BS,   Extremities: No edema, + peripheral pulses  Skin: No Rashes, Hematoma, Ecchymosis  Incisions:   Tubes:    A/P   Suspected Seizure -(+) LOC, (+) fecal and urinary incontinence (+) jaw clenching r/o arrhythmia /syncope  -admit to tele   -serial EKG / Rama   -2D echo /orthostatic vitals   -EEG   -Neurology eval   -EP for AICD interrogation  -seizure precautions and fall precautions      H/o CAD/CABG  HFrEF s/p AICD   -c/w current outpatient Rx   -keeps Is=Os    Dyslipidemia   -statin    DVT prophylaxis    PATIENT SEEN by ATTENDING 7/31/22 ( note revised 08/01) HPI:  72-year-old female with past medical history of hypertension, hyperlipidemia, CAD with CABG and AICD who presents with syncopal episode.  Per family, patient was eating food and started having abdominal pain all of a sudden and had a 1 episode of vomiting and patient syncopized.  Per daughter, patient passed out for almost a minute and regain her consciousness.  Reports that when patient passed out, she lost her bowel control and defecated herself.  Family also noticed that when patient passed out she was clenching her jaw.  Denies history of seizure.  Denies recent head injury or trauma or recent sickness.  Patient sees a cardiologist and was last seen several months ago.  Last time patient had stress test was few years ago and patient was unable to tolerate the stress test.  Patient also endorses dysuria a few days ago.  At the time of this interview, patient endorses general weakness, but denies fever, shortness of breath, chest pain, nausea, vomiting, abdominal pain, melena, and hematochezia.    ED vitals T(F): 97, HR: 80, BP: 119/58, RR: 19, SpO2: 98%   Labs show HB 12.2, HCT 32.7, , WBC 7.22, Mag 1.7, lactate 1.9, Trops .01, Pro-, EKG shows 1st degree AV block     127<L>  |  91<L>  |  17  ----------------------------<  121<H>  5.0   |  22  |  0.8    CT AB/P show No evidence of acute/inflammatory process in the abdomen or pelvis. A 2 mm nodule within the right lower lobe.   CT H shows Bilateral subcortical and periventricular white matter hypodensities, which likely represent chronic microvascular changes. No acute intracranial hemorrhage, mass-effect or midline shift.     (31 Jul 2022 22:36)    ADDITIONAL HISTORY - DAUGHTER AVAILABLE FOR INTERVIEW via Phone ( she witnessed event)  HER DAUGHTER GAVE A HISTORY OF A PREVIOUS EPISODE OF LOC W/ FECAL AND URINARY INCONTINENCE THAT OCCURRED ABOUT 2 YEARS AGO. THE PATIENT WAS HAVING A CONCURRENT FEBRILE ILLNESS ( NOT COVID AS PER DAUGHTER). NO HISTORY OF AICD FIRING DURING TODAY'S EVENT.      REVIEW OF SYSTEMS: see cc/HPI   CONSTITUTIONAL: No weakness, fevers or chills  EYES/ENT: No visual changes;  No vertigo or throat pain   NECK: No pain or stiffness  RESPIRATORY: No cough, wheezing, hemoptysis; No shortness of breath  CARDIOVASCULAR: No chest pain or palpitations  GASTROINTESTINAL: No abdominal or epigastric pain. No nausea, vomiting, or hematemesis; No diarrhea or constipation. No melena or hematochezia.  GENITOURINARY: No dysuria, frequency or hematuria  NEUROLOGICAL: No numbness or weakness, (+) LOC, (+) fecal and urinary incontinence (+) jaw clenching   SKIN: No itching, rashes    Physical Exam:  General: WN/WD NAD  Neurology: A&Ox3, nonfocal, follows commands  Eyes: PERRLA/ EOMI  ENT/Neck: Neck supple, trachea midline, No JVD  Respiratory: CTA B/L, No wheezing, rales, rhonchi  CV: Normal rate regular rhythm, S1S2, no murmurs, rubs or gallops  Abdominal: Soft, NT, ND +BS,   Extremities: No edema, + peripheral pulses  Skin: No Rashes, Hematoma, Ecchymosis  Incisions:   Tubes:    A/P   Suspected Seizure -(+) LOC, (+) fecal and urinary incontinence (+) jaw clenching r/o arrhythmia /syncope  -admit to tele   -serial EKG / Rama   -2D echo /orthostatic vitals   -EEG   -Neurology eval   -EP for AICD interrogation  -seizure precautions and fall precautions      H/o CAD/CABG  HFrEF s/p AICD   -c/w current outpatient Rx   -keeps Is=Os    Hyponatremia - possible 2/2 thiazide Rx ( chlorthalidone )  -fluid restriction for now - 1.2 L/day   -urine lytes / osm and serum osm   -d/w heart failure team alternative diuretic   -monitor BMP     Dyslipidemia   -statin    DVT prophylaxis    PATIENT SEEN by ATTENDING 7/31/22 ( note revised 08/01)

## 2022-07-31 NOTE — H&P ADULT - NSHPLABSRESULTS_GEN_ALL_CORE
< from: CT Head No Cont (07.31.22 @ 19:12) >    INTERPRETATION:  Clinical History / Reason for exam: syncope    Technique: CT head without contrast. CT of the head was performed without   contrast injection. Coronal and sagittal reformatted images were   submitted for anatomic correlation.    COMPARISON CT: 11/5/2019    FINDINGS:  Ventricles and sulci are compatible with parenchymal volume loss.    There is no acute intracranial hemorrhage, extra-axial fluid collections   or midline shift.    Bilateral subcortical and periventricular white matter hypodensities,   which likely represent chronic microvascular changes.    There is no depressed calvarial fracture. The mastoid air cells are   aerated.The paranasal sinuses are aerated.    Beam hardening artifact is noted overlying the brain stem and posterior   fossa which is inherent to CT in this location.    IMPRESSION:    No acute intracranial hemorrhage, mass-effect or midline shift.        --- End of Report ---    < end of copied text >

## 2022-07-31 NOTE — ED PROVIDER NOTE - PROGRESS NOTE DETAILS
Labs unremarkable. CT head and abd unremarkable. Will admit patient for syncope and further work up. Pt is aware of the result and has been endorsed to medicine team.

## 2022-08-01 ENCOUNTER — NON-APPOINTMENT (OUTPATIENT)
Age: 72
End: 2022-08-01

## 2022-08-01 LAB
24R-OH-CALCIDIOL SERPL-MCNC: 32.7 PG/ML
ALBUMIN SERPL ELPH-MCNC: 4.1 G/DL — SIGNIFICANT CHANGE UP (ref 3.5–5.2)
ALBUMIN SERPL ELPH-MCNC: 4.7 G/DL
ALP BLD-CCNC: 36 U/L
ALP SERPL-CCNC: 45 U/L — SIGNIFICANT CHANGE UP (ref 30–115)
ALT FLD-CCNC: 17 U/L — SIGNIFICANT CHANGE UP (ref 0–41)
ALT SERPL-CCNC: 16 U/L
ANION GAP SERPL CALC-SCNC: 12 MMOL/L — SIGNIFICANT CHANGE UP (ref 7–14)
ANION GAP SERPL CALC-SCNC: 13 MMOL/L
AST SERPL-CCNC: 16 U/L — SIGNIFICANT CHANGE UP (ref 0–41)
AST SERPL-CCNC: 19 U/L
BASOPHILS # BLD AUTO: 0.02 K/UL
BASOPHILS NFR BLD AUTO: 0.4 %
BILIRUB SERPL-MCNC: 0.5 MG/DL — SIGNIFICANT CHANGE UP (ref 0.2–1.2)
BILIRUB SERPL-MCNC: 0.7 MG/DL
BUN SERPL-MCNC: 12 MG/DL — SIGNIFICANT CHANGE UP (ref 10–20)
BUN SERPL-MCNC: 14 MG/DL
CALCIUM SERPL-MCNC: 10 MG/DL
CALCIUM SERPL-MCNC: 9.1 MG/DL — SIGNIFICANT CHANGE UP (ref 8.5–10.1)
CHLORIDE SERPL-SCNC: 87 MMOL/L
CHLORIDE SERPL-SCNC: 93 MMOL/L — LOW (ref 98–110)
CHOLEST SERPL-MCNC: 152 MG/DL — SIGNIFICANT CHANGE UP
CHOLEST SERPL-MCNC: 164 MG/DL
CO2 SERPL-SCNC: 25 MMOL/L
CO2 SERPL-SCNC: 26 MMOL/L — SIGNIFICANT CHANGE UP (ref 17–32)
CREAT SERPL-MCNC: 0.7 MG/DL — SIGNIFICANT CHANGE UP (ref 0.7–1.5)
CREAT SERPL-MCNC: 0.9 MG/DL
EGFR: 92 ML/MIN/1.73M2 — SIGNIFICANT CHANGE UP
EOSINOPHIL # BLD AUTO: 0.03 K/UL
EOSINOPHIL NFR BLD AUTO: 0.6 %
ESTIMATED AVERAGE GLUCOSE: 111 MG/DL
GLUCOSE SERPL-MCNC: 103 MG/DL
GLUCOSE SERPL-MCNC: 104 MG/DL — HIGH (ref 70–99)
HBA1C MFR BLD HPLC: 5.5 %
HCT VFR BLD CALC: 31.3 % — LOW (ref 37–47)
HCT VFR BLD CALC: 34.4 %
HDLC SERPL-MCNC: 76 MG/DL
HDLC SERPL-MCNC: 79 MG/DL — SIGNIFICANT CHANGE UP
HGB BLD-MCNC: 11.4 G/DL — LOW (ref 12–16)
HGB BLD-MCNC: 12.2 G/DL
IMM GRANULOCYTES NFR BLD AUTO: 0.4 %
LDLC SERPL CALC-MCNC: 68 MG/DL
LIPID PNL WITH DIRECT LDL SERPL: 62 MG/DL — SIGNIFICANT CHANGE UP
LYMPHOCYTES # BLD AUTO: 1.76 K/UL
LYMPHOCYTES NFR BLD AUTO: 37.1 %
MAGNESIUM SERPL-MCNC: 2.4 MG/DL — SIGNIFICANT CHANGE UP (ref 1.8–2.4)
MAN DIFF?: NORMAL
MCHC RBC-ENTMCNC: 33.8 PG — HIGH (ref 27–31)
MCHC RBC-ENTMCNC: 35.4 PG
MCHC RBC-ENTMCNC: 35.5 GM/DL
MCHC RBC-ENTMCNC: 36.4 G/DL — SIGNIFICANT CHANGE UP (ref 32–37)
MCV RBC AUTO: 92.9 FL — SIGNIFICANT CHANGE UP (ref 81–99)
MCV RBC AUTO: 99.7 FL
MONOCYTES # BLD AUTO: 0.58 K/UL
MONOCYTES NFR BLD AUTO: 12.2 %
NEUTROPHILS # BLD AUTO: 2.34 K/UL
NEUTROPHILS NFR BLD AUTO: 49.3 %
NON HDL CHOLESTEROL: 73 MG/DL — SIGNIFICANT CHANGE UP
NONHDLC SERPL-MCNC: 89 MG/DL
NRBC # BLD: 0 /100 WBCS — SIGNIFICANT CHANGE UP (ref 0–0)
OSMOLALITY SERPL: 273 MOS/KG
OSMOLALITY UR: 349 MOS/KG
PLATELET # BLD AUTO: 244 K/UL — SIGNIFICANT CHANGE UP (ref 130–400)
PLATELET # BLD AUTO: 256 K/UL
POTASSIUM SERPL-MCNC: 4.8 MMOL/L — SIGNIFICANT CHANGE UP (ref 3.5–5)
POTASSIUM SERPL-SCNC: 4.8 MMOL/L
POTASSIUM SERPL-SCNC: 4.8 MMOL/L — SIGNIFICANT CHANGE UP (ref 3.5–5)
PROT SERPL-MCNC: 6.1 G/DL — SIGNIFICANT CHANGE UP (ref 6–8)
PROT SERPL-MCNC: 6.8 G/DL
RBC # BLD: 3.37 M/UL — LOW (ref 4.2–5.4)
RBC # BLD: 3.45 M/UL
RBC # FLD: 12.5 %
RBC # FLD: 12.6 % — SIGNIFICANT CHANGE UP (ref 11.5–14.5)
SODIUM SERPL-SCNC: 125 MMOL/L
SODIUM SERPL-SCNC: 131 MMOL/L — LOW (ref 135–146)
T3FREE SERPL-MCNC: 2.34 PG/ML
T4 FREE SERPL-MCNC: 1.2 NG/DL
TRIGL SERPL-MCNC: 107 MG/DL
TRIGL SERPL-MCNC: 57 MG/DL — SIGNIFICANT CHANGE UP
TSH SERPL-MCNC: 2.08 UIU/ML — SIGNIFICANT CHANGE UP (ref 0.27–4.2)
WBC # BLD: 5.44 K/UL — SIGNIFICANT CHANGE UP (ref 4.8–10.8)
WBC # FLD AUTO: 4.75 K/UL
WBC # FLD AUTO: 5.44 K/UL — SIGNIFICANT CHANGE UP (ref 4.8–10.8)

## 2022-08-01 PROCEDURE — 93283 PRGRMG EVAL IMPLANTABLE DFB: CPT | Mod: 26

## 2022-08-01 PROCEDURE — 93306 TTE W/DOPPLER COMPLETE: CPT | Mod: 26

## 2022-08-01 PROCEDURE — 99222 1ST HOSP IP/OBS MODERATE 55: CPT

## 2022-08-01 PROCEDURE — 99233 SBSQ HOSP IP/OBS HIGH 50: CPT

## 2022-08-01 RX ORDER — LEVETIRACETAM 250 MG/1
250 TABLET, FILM COATED ORAL EVERY 12 HOURS
Refills: 0 | Status: DISCONTINUED | OUTPATIENT
Start: 2022-08-01 | End: 2022-08-02

## 2022-08-01 RX ADMIN — FAMOTIDINE 20 MILLIGRAM(S): 10 INJECTION INTRAVENOUS at 05:44

## 2022-08-01 RX ADMIN — CLOPIDOGREL BISULFATE 75 MILLIGRAM(S): 75 TABLET, FILM COATED ORAL at 12:00

## 2022-08-01 RX ADMIN — ISOSORBIDE MONONITRATE 90 MILLIGRAM(S): 60 TABLET, EXTENDED RELEASE ORAL at 12:01

## 2022-08-01 RX ADMIN — FAMOTIDINE 20 MILLIGRAM(S): 10 INJECTION INTRAVENOUS at 18:10

## 2022-08-01 RX ADMIN — ATORVASTATIN CALCIUM 40 MILLIGRAM(S): 80 TABLET, FILM COATED ORAL at 21:53

## 2022-08-01 RX ADMIN — Medication 81 MILLIGRAM(S): at 12:00

## 2022-08-01 RX ADMIN — SACUBITRIL AND VALSARTAN 1 TABLET(S): 24; 26 TABLET, FILM COATED ORAL at 18:10

## 2022-08-01 RX ADMIN — ENOXAPARIN SODIUM 40 MILLIGRAM(S): 100 INJECTION SUBCUTANEOUS at 05:44

## 2022-08-01 RX ADMIN — RANOLAZINE 1000 MILLIGRAM(S): 500 TABLET, FILM COATED, EXTENDED RELEASE ORAL at 18:10

## 2022-08-01 NOTE — CONSULT NOTE ADULT - ASSESSMENT
72-yO F with PMHx of hypertension, hyperlipidemia, CAD with CABG and AICD p/w syncopal episode.  Per family, patient was sitting and eating food and started having abdominal pain acutely and had a 1 episode of vomiting and patient synopsized.  Per daughter, patient passed out for almost a min and regained her consciousness gradually.  During the syncopal episode patient was noted to bite her tongue , clench her jaws, a/w b/b incontinence. Denies history of seizure, but reports a  previous similar episode of syncope about 2-3 years ago for which she did not seek medical attention. Denies h/o head trauma, recent falls or  sick contacts.      #Syncope; history highly c/w seizure      Plan  N/C MRI BRAIN  REEG  Start Keppra 250mg bid will adjust dose as per eeg results  Correct electrolytes, trend cmp  Administer magnesium rider 1 gram x1 now keep mag level >2<2.5  Seizure / fall precautions  Check ua urine culture  Call for seizures  Neuro attending note will follow

## 2022-08-01 NOTE — CONSULT NOTE ADULT - SUBJECTIVE AND OBJECTIVE BOX
CC: Syncope        HPI:  72-yO F with PMHx of hypertension, hyperlipidemia, CAD with CABG and AICD p/w syncopal episode.  Per family, patient was sitting and eating food and started having abdominal pain acutely and had a 1 episode of vomiting and patient synopsized.  Per daughter, patient passed out for almost a min and regained her consciousness gradually.  During the syncopal episode patient was noted to bite her tongue , clench her jaws, a/w b/b incontinence. Denies history of seizure, but reports a  previous similar episode of syncope about 2-3 years ago for which she did not seek medical attention. Denies h/o head trauma, recent falls or  sick contacts.      ED vitals T(F): 97, HR: 80, BP: 119/58, RR: 19, SpO2: 98%   Labs show HB 12.2, HCT 32.7, , WBC 7.22, Mag 1.7, lactate 1.9, Trops .01, Pro-, EKG shows 1st degree AV block     127<L>  |  91<L>  |  17  ----------------------------<  121<H>  5.0   |  22  |  0.8      Home Medications:  Aspir 81 oral delayed release tablet: 1 tab(s) orally once a day (16 Jan 2020 14:29)  Atorvastatin 40 mg oral tablet: 1 tab(s) orally once a day (31 Jul 2022 23:35)  Chlorthalidone 25 mg oral tablet: 1 tab(s) orally once a day (16 Jan 2020 14:29)  Clopidogrel 75 mg oral tablet: 1 tab(s) orally once a day (16 Jan 2020 14:29)  Entresto 24 mg-26 mg oral tablet: 1 tab(s) orally 2 times a day (31 Jul 2022 23:37)  Famotidine 20 mg oral tablet: orally 2 times a day (31 Jul 2022 23:36)  Metoprolol succinate 100 mg oral tablet, extended release: 1 tab(s) orally once a day (16 Jan 2020 14:29)  Ranexa 1000 mg oral tablet, extended release: 1 tab(s) orally 2 times a day (31 Jul 2022 23:34)  Spironolactone 25 mg oral tablet: 1 tab(s) orally once a day (31 Jul 2022 23:38)      Social History  Denies smoking alcohol or drug abuse    Neuro Exam:  Orientation: oriented to person, place time and situation. Follows commands  Attention: normal concentrating ability.   Language: no difficulty naming common objects, no difficulty repeating a phrase, fluency intact, comprehension intact.  Fund of knowledge: Bengali speaking but understands some english, was able to give history   Cranial Nerves: EOMI, PERRLA, VFF, Face is symmetric, facial sensation is intact, speech and language is intact, normal shoulder shrug.  Motor: 5/5 throughout/ no drift             No tremors or other abnormal mvmts  Sensory exam:  intact and symmetric  Coordination:. no dysmetria or limb ataxia  Gait: steady        Allergies    No Known Allergies    Intolerances      MEDICATIONS  (STANDING):  aspirin enteric coated 81 milliGRAM(s) Oral daily  atorvastatin Oral Tab/Cap - Peds 40 milliGRAM(s) Oral daily  clopidogrel Tablet 75 milliGRAM(s) Oral daily  enoxaparin Injectable 40 milliGRAM(s) SubCutaneous every 24 hours  famotidine  Oral Tab/Cap - Peds 20 milliGRAM(s) Oral two times a day  isosorbide   mononitrate ER Tablet (IMDUR) 90 milliGRAM(s) Oral daily  metoprolol succinate  milliGRAM(s) Oral daily  ranolazine 1000 milliGRAM(s) Oral two times a day  sacubitril 24 mG/valsartan 26 mG 1 Tablet(s) Oral two times a day  spironolactone 25 milliGRAM(s) Oral daily        MEDICATIONS  (PRN):      LABS:                        12.2   7.22  )-----------( 254      ( 31 Jul 2022 18:04 )             32.7     07-31    127<L>  |  91<L>  |  17  ----------------------------<  121<H>  5.0   |  22  |  0.8    Ca    9.6      31 Jul 2022 18:04  Mg     1.7     07-31    TPro  7.0  /  Alb  4.6  /  TBili  0.4  /  DBili  x   /  AST  21  /  ALT  21  /  AlkPhos  73  07-31      COVID-19 PCR . (07.31.22 @ 18:04)   COVID-19 PCR: NotDetec:      Neuro Imaging:  < from: CT Head No Cont (07.31.22 @ 19:12) >  FINDINGS:  Ventricles and sulci are compatible with parenchymal volume loss.    There is no acute intracranial hemorrhage, extra-axial fluid collections   or midline shift.    Bilateral subcortical and periventricular white matter hypodensities,   which likely represent chronic microvascular changes.    There is no depressed calvarial fracture. The mastoid air cells are   aerated.The paranasal sinuses are aerated.    Beam hardening artifact is noted overlying the brain stem and posterior   fossa which is inherent to CT in this location.    IMPRESSION:    No acute intracranial hemorrhage, mass-effect or midline shift.        --- End of Report ---      DAVIDSON PILLAI MD; Attending Radiologist  This document has been electronically signed. Jul 31 2022  9:45PM    < end of copied text >    EEG:     Echo:   Carotid Doppler: N/A  Telemetry:

## 2022-08-01 NOTE — PATIENT PROFILE ADULT - FALL HARM RISK - HARM RISK INTERVENTIONS

## 2022-08-01 NOTE — PROGRESS NOTE ADULT - SUBJECTIVE AND OBJECTIVE BOX
Pt seen and examined at bedside. No CP or SOB. Spoke to pt with [facility] speaking nursing staff.     PAST MEDICAL & SURGICAL HISTORY:  CAD (coronary artery disease)    Hypercholesterolemia    HTN (hypertension)    S/P CABG (coronary artery bypass graft)  [facility]    History of left heart catheterization (LHC)  MULTIPLE    AICD (automatic cardioverter/defibrillator) present        VITAL SIGNS (Last 24 hrs):  T(C): 36.4 (08-01-22 @ 07:28), Max: 36.7 (08-01-22 @ 00:29)  HR: 52 (08-01-22 @ 07:28) (50 - 80)  BP: 106/52 (08-01-22 @ 07:28) (92/50 - 121/59)  RR: 16 (08-01-22 @ 07:28) (16 - 20)  SpO2: 97% (08-01-22 @ 07:28) (96% - 98%)  Wt(kg): --  Daily Height in cm: 157.48 (31 Jul 2022 16:24)    Daily     I&O's Summary      PHYSICAL EXAM:  GENERAL: NAD, well-developed  HEAD:  Atraumatic, Normocephalic  EYES: EOMI, PERRLA, conjunctiva and sclera clear  NECK: Supple, No JVD  CHEST/LUNG: Clear to auscultation bilaterally; No wheeze  HEART: Regular rate and rhythm; No murmurs, rubs, or gallops  ABDOMEN: Soft, Nontender, Nondistended; Bowel sounds present  EXTREMITIES:  2+ Peripheral Pulses, No clubbing, cyanosis, or edema  PSYCH: AAOx3  NEUROLOGY: non-focal  SKIN: No rashes or lesions    Labs Reviewed  Spoke to patient in regards to abnormal labs.    CBC Full  -  ( 01 Aug 2022 07:46 )  WBC Count : 5.44 K/uL  Hemoglobin : 11.4 g/dL  Hematocrit : 31.3 %  Platelet Count - Automated : 244 K/uL  Mean Cell Volume : 92.9 fL  Mean Cell Hemoglobin : 33.8 pg  Mean Cell Hemoglobin Concentration : 36.4 g/dL  Auto Neutrophil # : x  Auto Lymphocyte # : x  Auto Monocyte # : x  Auto Eosinophil # : x  Auto Basophil # : x  Auto Neutrophil % : x  Auto Lymphocyte % : x  Auto Monocyte % : x  Auto Eosinophil % : x  Auto Basophil % : x    BMP:    08-01 @ 07:46    Blood Urea Nitrogen - 12  Calcium - 9.1  Carbond Dioxide - 26  Chloride - 93  Creatinine - 0.7  Glucose - 104  Potassium - 4.8  Sodium - 131      Hemoglobin A1c -     Urine Culture:        COVID Labs  CRP:      D-Dimer:    Imaging reviewed independently and reviewed read    < from: CT Abdomen and Pelvis w/ IV Cont (07.31.22 @ 19:28) >  IMPRESSION:  No evidence of acute/inflammatory process in the abdomen or pelvis.    2 mm nodule within the right lower lobe. In a high-risk patient,   noncontrast CT chest is recommended in 12 months to assess for stability.    < end of copied text >    MEDICATIONS  (STANDING):  aspirin enteric coated 81 milliGRAM(s) Oral daily  atorvastatin Oral Tab/Cap - Peds 40 milliGRAM(s) Oral daily  clopidogrel Tablet 75 milliGRAM(s) Oral daily  enoxaparin Injectable 40 milliGRAM(s) SubCutaneous every 24 hours  famotidine  Oral Tab/Cap - Peds 20 milliGRAM(s) Oral two times a day  isosorbide   mononitrate ER Tablet (IMDUR) 90 milliGRAM(s) Oral daily  levETIRAcetam  IVPB 250 milliGRAM(s) IV Intermittent every 12 hours  metoprolol succinate  milliGRAM(s) Oral daily  ranolazine 1000 milliGRAM(s) Oral two times a day  sacubitril 24 mG/valsartan 26 mG 1 Tablet(s) Oral two times a day  spironolactone 25 milliGRAM(s) Oral daily    MEDICATIONS  (PRN):

## 2022-08-01 NOTE — CONSULT NOTE ADULT - NS ATTEND AMEND GEN_ALL_CORE FT
Patient seen and examined and agree with above except as noted.  Patients history, notes, labs, imaging, vitals and meds reviewed personally.  Patients daughter called on phone confirming history above.  Patient had severe abdominal pain then had episode of LOC with bowel, bladder incontinence and vomiting.  She took 30-60 minutes to slowly improve and was showered by family before ems arrived.  Exam currently non-focal    Plan as above  Etiology possibly cardiac  If REEG normal and MRI brain does not show any obvious structural lesions then would dc keppra

## 2022-08-01 NOTE — PROGRESS NOTE ADULT - ASSESSMENT
72-year-old female with past medical history of hypertension, hyperlipidemia, CAD s/p CABG and AICD who presents with syncopal episode.  Per family, patient was eating food and started having abdominal pain all of a sudden and had a 1 episode of vomiting and patient syncopized. No head trauma, +LOC, -AC.     #Syncope 2/2 neuro mediated vs orthostatic vs cardiovascular  - No head trauma, +LOC, -AC.   - WBC 7.22, Mag 1.7, lactate 1.9  - Trops .01   - Pro-  - EKG shows 1st degree AV block   - CT AB/P show No evidence of acute/inflammatory process in the abdomen or pelvis. A 2 mm nodule within the right lower lobe.   - CT H shows Bilateral subcortical and periventricular white matter hypodensities, which likely represent chronic microvascular changes. No acute intracranial hemorrhage, mass-effect or midline shift.  - Given normal lactate and normal WBC count seizure less likely but given incontinence and jaw clenching with postictal state of weakness but will do w/u to r/o possibility of seizure   - Admit to tele 24 hrs   - Check orthostatic  - F/u ECHO with bubble study   - F/u carotid artery duplex   - F/u EPS eval for device interrogation - neg no events   - F/u EEG  - F/u Neuro eval appreciated, pt unable to have MRI as device not compatible, will follow up with neuro    #2 mm nodule within the right lower lobe- outpt pulm follow up     #CAD s/p CABG   #HFrEF s/p AICD   - c/w home meds  - Fluid restriction   - strict Is/Os   - Daily weight   - F/u ECHO     #HLD   - F/u lipid panel   - c/w statin     #Progress Note Handoff  Pending (specify):  follow up neuro, cardiology, cardiac telemonitoring 24 hrs, eeg   discussion: house staff updated pt family  Disposition: home   Anticipated date: 8/2-8/3, anticipated

## 2022-08-01 NOTE — ED ADULT NURSE REASSESSMENT NOTE - NS ED NURSE REASSESS COMMENT FT1
Pt received from outgoing shift at 0730. Pt is alert and oriented x4. No s/s of respiratory distress. Pt is able to make all needs known. No further complaints at this time. Pt is currently admitted to Tele. Pending bed status at this time. Safety and comfort measures in place. Fall precautions in place as per hospital policy. Will continue to monitor and assess for changes.

## 2022-08-02 ENCOUNTER — TRANSCRIPTION ENCOUNTER (OUTPATIENT)
Age: 72
End: 2022-08-02

## 2022-08-02 VITALS
SYSTOLIC BLOOD PRESSURE: 121 MMHG | HEART RATE: 51 BPM | DIASTOLIC BLOOD PRESSURE: 56 MMHG | TEMPERATURE: 96 F | OXYGEN SATURATION: 99 % | RESPIRATION RATE: 18 BRPM

## 2022-08-02 LAB
ALBUMIN SERPL ELPH-MCNC: 4.1 G/DL — SIGNIFICANT CHANGE UP (ref 3.5–5.2)
ALP SERPL-CCNC: 42 U/L — SIGNIFICANT CHANGE UP (ref 30–115)
ALT FLD-CCNC: 18 U/L — SIGNIFICANT CHANGE UP (ref 0–41)
ANION GAP SERPL CALC-SCNC: 10 MMOL/L — SIGNIFICANT CHANGE UP (ref 7–14)
AST SERPL-CCNC: 17 U/L — SIGNIFICANT CHANGE UP (ref 0–41)
BILIRUB SERPL-MCNC: 0.5 MG/DL — SIGNIFICANT CHANGE UP (ref 0.2–1.2)
BUN SERPL-MCNC: 9 MG/DL — LOW (ref 10–20)
CALCIUM SERPL-MCNC: 9.3 MG/DL — SIGNIFICANT CHANGE UP (ref 8.5–10.1)
CHLORIDE SERPL-SCNC: 93 MMOL/L — LOW (ref 98–110)
CO2 SERPL-SCNC: 28 MMOL/L — SIGNIFICANT CHANGE UP (ref 17–32)
CREAT SERPL-MCNC: 0.7 MG/DL — SIGNIFICANT CHANGE UP (ref 0.7–1.5)
EGFR: 92 ML/MIN/1.73M2 — SIGNIFICANT CHANGE UP
FOLATE SERPL-MCNC: 10.7 NG/ML — SIGNIFICANT CHANGE UP
GLUCOSE SERPL-MCNC: 106 MG/DL — HIGH (ref 70–99)
HCT VFR BLD CALC: 33.1 % — LOW (ref 37–47)
HGB BLD-MCNC: 11.7 G/DL — LOW (ref 12–16)
MAGNESIUM SERPL-MCNC: 2 MG/DL — SIGNIFICANT CHANGE UP (ref 1.8–2.4)
MCHC RBC-ENTMCNC: 33.4 PG — HIGH (ref 27–31)
MCHC RBC-ENTMCNC: 35.3 G/DL — SIGNIFICANT CHANGE UP (ref 32–37)
MCV RBC AUTO: 94.6 FL — SIGNIFICANT CHANGE UP (ref 81–99)
NRBC # BLD: 0 /100 WBCS — SIGNIFICANT CHANGE UP (ref 0–0)
OSMOLALITY SERPL: 274 MOS/KG — LOW (ref 280–301)
PLATELET # BLD AUTO: 267 K/UL — SIGNIFICANT CHANGE UP (ref 130–400)
POTASSIUM SERPL-MCNC: 3.9 MMOL/L — SIGNIFICANT CHANGE UP (ref 3.5–5)
POTASSIUM SERPL-SCNC: 3.9 MMOL/L — SIGNIFICANT CHANGE UP (ref 3.5–5)
PROT SERPL-MCNC: 6.5 G/DL — SIGNIFICANT CHANGE UP (ref 6–8)
RBC # BLD: 3.5 M/UL — LOW (ref 4.2–5.4)
RBC # FLD: 12.7 % — SIGNIFICANT CHANGE UP (ref 11.5–14.5)
SODIUM SERPL-SCNC: 131 MMOL/L — LOW (ref 135–146)
TSH SERPL-MCNC: 3.66 UIU/ML — SIGNIFICANT CHANGE UP (ref 0.27–4.2)
VIT B12 SERPL-MCNC: 435 PG/ML — SIGNIFICANT CHANGE UP (ref 232–1245)
WBC # BLD: 5.02 K/UL — SIGNIFICANT CHANGE UP (ref 4.8–10.8)
WBC # FLD AUTO: 5.02 K/UL — SIGNIFICANT CHANGE UP (ref 4.8–10.8)

## 2022-08-02 PROCEDURE — 99239 HOSP IP/OBS DSCHRG MGMT >30: CPT

## 2022-08-02 PROCEDURE — 95819 EEG AWAKE AND ASLEEP: CPT | Mod: 26

## 2022-08-02 PROCEDURE — 93880 EXTRACRANIAL BILAT STUDY: CPT | Mod: 26

## 2022-08-02 RX ORDER — LEVETIRACETAM 250 MG/1
250 TABLET, FILM COATED ORAL
Refills: 0 | Status: DISCONTINUED | OUTPATIENT
Start: 2022-08-02 | End: 2022-08-02

## 2022-08-02 RX ORDER — LEVETIRACETAM 250 MG/1
1 TABLET, FILM COATED ORAL
Qty: 60 | Refills: 0
Start: 2022-08-02

## 2022-08-02 RX ORDER — SENNA PLUS 8.6 MG/1
2 TABLET ORAL AT BEDTIME
Refills: 0 | Status: DISCONTINUED | OUTPATIENT
Start: 2022-08-02 | End: 2022-08-02

## 2022-08-02 RX ORDER — ISOSORBIDE MONONITRATE 60 MG/1
3 TABLET, EXTENDED RELEASE ORAL
Qty: 90 | Refills: 0
Start: 2022-08-02 | End: 2022-08-31

## 2022-08-02 RX ORDER — POLYETHYLENE GLYCOL 3350 17 G/17G
17 POWDER, FOR SOLUTION ORAL DAILY
Refills: 0 | Status: DISCONTINUED | OUTPATIENT
Start: 2022-08-02 | End: 2022-08-02

## 2022-08-02 RX ORDER — LEVETIRACETAM 250 MG/1
1 TABLET, FILM COATED ORAL
Qty: 60 | Refills: 0
Start: 2022-08-02 | End: 2022-08-31

## 2022-08-02 RX ORDER — CHLORTHALIDONE 50 MG
1 TABLET ORAL
Qty: 0 | Refills: 0 | DISCHARGE

## 2022-08-02 RX ADMIN — SPIRONOLACTONE 25 MILLIGRAM(S): 25 TABLET, FILM COATED ORAL at 06:32

## 2022-08-02 RX ADMIN — FAMOTIDINE 20 MILLIGRAM(S): 10 INJECTION INTRAVENOUS at 06:32

## 2022-08-02 RX ADMIN — LEVETIRACETAM 400 MILLIGRAM(S): 250 TABLET, FILM COATED ORAL at 06:31

## 2022-08-02 RX ADMIN — ENOXAPARIN SODIUM 40 MILLIGRAM(S): 100 INJECTION SUBCUTANEOUS at 06:33

## 2022-08-02 RX ADMIN — SACUBITRIL AND VALSARTAN 1 TABLET(S): 24; 26 TABLET, FILM COATED ORAL at 06:33

## 2022-08-02 RX ADMIN — Medication 100 MILLIGRAM(S): at 06:32

## 2022-08-02 RX ADMIN — CLOPIDOGREL BISULFATE 75 MILLIGRAM(S): 75 TABLET, FILM COATED ORAL at 11:35

## 2022-08-02 RX ADMIN — Medication 81 MILLIGRAM(S): at 11:35

## 2022-08-02 RX ADMIN — ISOSORBIDE MONONITRATE 90 MILLIGRAM(S): 60 TABLET, EXTENDED RELEASE ORAL at 11:35

## 2022-08-02 RX ADMIN — RANOLAZINE 1000 MILLIGRAM(S): 500 TABLET, FILM COATED, EXTENDED RELEASE ORAL at 06:32

## 2022-08-02 NOTE — DISCHARGE NOTE PROVIDER - NSDCCPCAREPLAN_GEN_ALL_CORE_FT
PRINCIPAL DISCHARGE DIAGNOSIS  Diagnosis: Syncope  Assessment and Plan of Treatment: Syncope  Syncope is when you temporarily lose consciousness, also called fainting or passing out. It is caused by a sudden decrease in blood flow to the brain. Even though most causes of syncope are not dangerous, syncope can possibly be a sign of a serious medical problem. Signs that you may be about to faint include feeling dizzy, lightheaded, nausea, visual changes, or cold/clammy skin. Do not drive, operate heavy machinery, or play sports until your health care provider says it is okay.  SEEK IMMEDIATE MEDICAL CARE IF YOU HAVE ANY OF THE FOLLOWING SYMPTOMS: severe headache, pain in your chest/abdomen/back, bleeding from your mouth or rectum, palpitations, shortness of breath, pain with breathing, seizure, confusion, or trouble walking.

## 2022-08-02 NOTE — DISCHARGE NOTE NURSING/CASE MANAGEMENT/SOCIAL WORK - NSDCPEFALRISK_GEN_ALL_CORE
For information on Fall & Injury Prevention, visit: [email or web address] OR  [email or web address] OR  https://www.cdc.gov/steadi/patient.html

## 2022-08-02 NOTE — DISCHARGE NOTE PROVIDER - NSDCFUSCHEDAPPT_GEN_ALL_CORE_FT
Gregory Stein  [facility]  INTMED [address]  Scheduled Appointment: 08/11/2022    Yaakov Scott  [facility]  CARDIOLOGY [address]  Scheduled Appointment: 08/11/2022    [facility]  Cardiology [address]  Scheduled Appointment: 08/23/2022

## 2022-08-02 NOTE — DISCHARGE NOTE PROVIDER - NSDCMRMEDTOKEN_GEN_ALL_CORE_FT
Aspir 81 oral delayed release tablet: 1 tab(s) orally once a day  atorvastatin 40 mg oral tablet: 1 tab(s) orally once a day  clopidogrel 75 mg oral tablet: 1 tab(s) orally once a day  Entresto 24 mg-26 mg oral tablet: 1 tab(s) orally 2 times a day  famotidine 20 mg oral tablet: orally 2 times a day  isosorbide mononitrate 30 mg oral tablet, extended release: 3 tab(s) orally once a day   metoprolol succinate 100 mg oral tablet, extended release: 1 tab(s) orally once a day  Ranexa 1000 mg oral tablet, extended release: 1 tab(s) orally 2 times a day  spironolactone 25 mg oral tablet: 1 tab(s) orally once a day   Aspir 81 oral delayed release tablet: 1 tab(s) orally once a day  atorvastatin 40 mg oral tablet: 1 tab(s) orally once a day  clopidogrel 75 mg oral tablet: 1 tab(s) orally once a day  Entresto 24 mg-26 mg oral tablet: 1 tab(s) orally 2 times a day  famotidine 20 mg oral tablet: orally 2 times a day  isosorbide mononitrate 30 mg oral tablet, extended release: 3 tab(s) orally once a day   Keppra 250 mg oral tablet: 1 tab(s) orally 2 times a day  metoprolol succinate 100 mg oral tablet, extended release: 1 tab(s) orally once a day  Ranexa 1000 mg oral tablet, extended release: 1 tab(s) orally 2 times a day  spironolactone 25 mg oral tablet: 1 tab(s) orally once a day

## 2022-08-02 NOTE — PROGRESS NOTE ADULT - SUBJECTIVE AND OBJECTIVE BOX
pt seen and examined.   daughter is at the bedside. and pt wants her to translate and help her with decision making        ROS: no cp, no sob, no n/v, no fever    PAST MEDICAL & SURGICAL HISTORY:  CAD (coronary artery disease)      Hypercholesterolemia      HTN (hypertension)      S/P CABG (coronary artery bypass graft)  2011 [facility]      History of left heart catheterization (LHC)  MULTIPLE      AICD (automatic cardioverter/defibrillator) present          Vital Signs Last 24 Hrs  T(C): 35.8 (02 Aug 2022 07:25), Max: 36.7 (01 Aug 2022 16:11)  T(F): 96.5 (02 Aug 2022 07:25), Max: 98.1 (01 Aug 2022 16:11)  HR: 51 (02 Aug 2022 07:25) (51 - 63)  BP: 121/56 (02 Aug 2022 07:25) (110/55 - 148/63)  BP(mean): --  RR: 18 (02 Aug 2022 07:25) (17 - 18)  SpO2: 99% (02 Aug 2022 07:25) (97% - 99%)    Parameters below as of 02 Aug 2022 07:25  Patient On (Oxygen Delivery Method): room air        physical exam  constitutional NAD, AAOX3, Respiratory  lungs CTA, CVS heart RRR, GI: abdomen Soft NT, ND, BS+, skin: intact  neuro exam Motor, sensory and CN normal, no deficit     MEDICATIONS  (STANDING):  aspirin enteric coated 81 milliGRAM(s) Oral daily  atorvastatin Oral Tab/Cap - Peds 40 milliGRAM(s) Oral daily  clopidogrel Tablet 75 milliGRAM(s) Oral daily  enoxaparin Injectable 40 milliGRAM(s) SubCutaneous every 24 hours  famotidine  Oral Tab/Cap - Peds 20 milliGRAM(s) Oral two times a day  isosorbide   mononitrate ER Tablet (IMDUR) 90 milliGRAM(s) Oral daily  levETIRAcetam  IVPB 250 milliGRAM(s) IV Intermittent every 12 hours  metoprolol succinate  milliGRAM(s) Oral daily  ranolazine 1000 milliGRAM(s) Oral two times a day  sacubitril 24 mG/valsartan 26 mG 1 Tablet(s) Oral two times a day  senna 2 Tablet(s) Oral at bedtime  spironolactone 25 milliGRAM(s) Oral daily    MEDICATIONS  (PRN):  polyethylene glycol 3350 17 Gram(s) Oral daily PRN Constipation               11.7   5.02  )-----------( 267      ( 02 Aug 2022 08:24 )             33.1     08-02    131<L>  |  93<L>  |  9<L>  ----------------------------<  106<H>  3.9   |  28  |  0.7    Ca    9.3      02 Aug 2022 08:24  Mg     2.0     08-02    TPro  6.5  /  Alb  4.1  /  TBili  0.5  /  DBili  x   /  AST  17  /  ALT  18  /  AlkPhos  42  08-02      COVID-19 PCR: NotDetec (07-31-22 @ 18:04)      CARDIAC MARKERS ( 31 Jul 2022 18:04 )  x     / <0.01 ng/mL / x     / x     / x        < from: CT Head No Cont (07.31.22 @ 19:12) >  No acute intracranial hemorrhage, mass-effect or midline shift.    < end of copied text >    < from: CT Abdomen and Pelvis w/ IV Cont (07.31.22 @ 19:28) >  No evidence of acute/inflammatory process in the abdomen or pelvis.    2 mm nodule within the right lower lobe. In a high-risk patient,   noncontrast CT chest is recommended in 12 months to assess for stability.    < end of copied text >    < from: EEG (08.01.22 @ 12:00) >  Normal    < end of copied text >    a/p  # poss sz, had a similar episode 3 years ago   eeg neg  pt is refusing MRI   dw neurology dr kristy hatfield,   vinh home and fu with neurology office as outpt for ambulatory extended EEG     PAST MEDICAL & SURGICAL HISTORY: cont meds   CAD (coronary artery disease)      Hypercholesterolemia      HTN (hypertension)      S/P CABG (coronary artery bypass graft)  2011 [facility]      History of left heart catheterization (LHC)  MULTIPLE      AICD (automatic cardioverter/defibrillator) present  interogated by ep  [employer] AICD interrogated 8/1/22  No events noted  Device functioning properly    dc home   time spent 35 min   spoke with daughter at the bedside in am and also called her with the update.   pt to follow up with neuro and pmd                  pt seen and examined.   daughter is at the bedside. and pt wants her to translate and help her with decision making        ROS: no cp, no sob, no n/v, no fever    PAST MEDICAL & SURGICAL HISTORY:  CAD (coronary artery disease)      Hypercholesterolemia      HTN (hypertension)      S/P CABG (coronary artery bypass graft)  2011 [facility]      History of left heart catheterization (LHC)  MULTIPLE      AICD (automatic cardioverter/defibrillator) present          Vital Signs Last 24 Hrs  T(C): 35.8 (02 Aug 2022 07:25), Max: 36.7 (01 Aug 2022 16:11)  T(F): 96.5 (02 Aug 2022 07:25), Max: 98.1 (01 Aug 2022 16:11)  HR: 51 (02 Aug 2022 07:25) (51 - 63)  BP: 121/56 (02 Aug 2022 07:25) (110/55 - 148/63)  BP(mean): --  RR: 18 (02 Aug 2022 07:25) (17 - 18)  SpO2: 99% (02 Aug 2022 07:25) (97% - 99%)    Parameters below as of 02 Aug 2022 07:25  Patient On (Oxygen Delivery Method): room air        physical exam  constitutional NAD, AAOX3, Respiratory  lungs CTA, CVS heart RRR, GI: abdomen Soft NT, ND, BS+, skin: intact  neuro exam Motor, sensory and CN normal, no deficit     MEDICATIONS  (STANDING):  aspirin enteric coated 81 milliGRAM(s) Oral daily  atorvastatin Oral Tab/Cap - Peds 40 milliGRAM(s) Oral daily  clopidogrel Tablet 75 milliGRAM(s) Oral daily  enoxaparin Injectable 40 milliGRAM(s) SubCutaneous every 24 hours  famotidine  Oral Tab/Cap - Peds 20 milliGRAM(s) Oral two times a day  isosorbide   mononitrate ER Tablet (IMDUR) 90 milliGRAM(s) Oral daily  levETIRAcetam  IVPB 250 milliGRAM(s) IV Intermittent every 12 hours  metoprolol succinate  milliGRAM(s) Oral daily  ranolazine 1000 milliGRAM(s) Oral two times a day  sacubitril 24 mG/valsartan 26 mG 1 Tablet(s) Oral two times a day  senna 2 Tablet(s) Oral at bedtime  spironolactone 25 milliGRAM(s) Oral daily    MEDICATIONS  (PRN):  polyethylene glycol 3350 17 Gram(s) Oral daily PRN Constipation               11.7   5.02  )-----------( 267      ( 02 Aug 2022 08:24 )             33.1     08-02    131<L>  |  93<L>  |  9<L>  ----------------------------<  106<H>  3.9   |  28  |  0.7    Ca    9.3      02 Aug 2022 08:24  Mg     2.0     08-02    TPro  6.5  /  Alb  4.1  /  TBili  0.5  /  DBili  x   /  AST  17  /  ALT  18  /  AlkPhos  42  08-02      COVID-19 PCR: NotDetec (07-31-22 @ 18:04)      CARDIAC MARKERS ( 31 Jul 2022 18:04 )  x     / <0.01 ng/mL / x     / x     / x        < from: CT Head No Cont (07.31.22 @ 19:12) >  No acute intracranial hemorrhage, mass-effect or midline shift.    < end of copied text >    < from: CT Abdomen and Pelvis w/ IV Cont (07.31.22 @ 19:28) >  No evidence of acute/inflammatory process in the abdomen or pelvis.    2 mm nodule within the right lower lobe. In a high-risk patient,   noncontrast CT chest is recommended in 12 months to assess for stability.    < end of copied text >    < from: EEG (08.01.22 @ 12:00) >  Normal    < end of copied text >    a/p  # poss sz, had a similar episode 3 years ago   eeg neg  pt is refusing MRI   dw neurology dr kristy hatfield dc home and fu with neurology office as outpt for ambulatory extended EEG        # Hyponatremia,   mild,   improved 127->131  check urine sodium   outpt fu   free water restriction    # CAD (coronary artery disease) , HTN, dyslipidemia, hx of CABG , cont meds     # AICD (automatic cardioverter/defibrillator) present  interogated by ep  [facility] [employer] A[employer] interrogated 8/1/22  No events noted  Device functioning properly    dc home   time spent 35 min   spoke with daughter at the bedside in am and also called her with the update.   pt to follow up with neuro and pmd

## 2022-08-02 NOTE — CHART NOTE - NSCHARTNOTEFT_GEN_A_CORE
Short term EEG resulted no epileptiform activity, Pt has AICD which is not MRI compatible.   Pt can follow up with neurology as outpatient for Ambulatory EEG.   Thank you for the courtesy of this consult, we sign off the case. please contact us if any neurological changes or questions.
[employer] [facility] A[employer] interrogated 8/1/22  No events noted  Device functioning properly    Recall EP as needed [id number]

## 2022-08-02 NOTE — DISCHARGE NOTE PROVIDER - HOSPITAL COURSE
Patient presented to the ED on 7/31 after a syncopal episode at home. She underwent an EEG which showed no signs of seizure-like activity. CT head, abdomen, and pelvis showed no evidence of acute processes. Patient was found to be slightly hyponatremic, improving with water restriction. Patient was seen by the neurology service and was started on Keppra. She was unable to undergo MRI of the brain due to her implanted defibrillator, which is not MRI compatible. Plan for outpatient followup with neurology for ambulatory EEG.

## 2022-08-02 NOTE — DISCHARGE NOTE NURSING/CASE MANAGEMENT/SOCIAL WORK - PATIENT PORTAL LINK FT
You can access the FollowMyHealth Patient Portal offered by [facility] by registering at the following website: [email or web address]. By joining [facility]’s FollowMyHealth portal, you will also be able to view your health information using other applications (apps) compatible with our system.

## 2022-08-02 NOTE — DISCHARGE NOTE PROVIDER - CARE PROVIDER_API CALL
Raulito Stacy)  EEGEpilepsy; Neurology  [address] [place] [address], [address]  [place] [address]  Phone: [phone]  Fax: [phone]  Follow Up Time: 1-3 days

## 2022-08-05 DIAGNOSIS — Z45.02 ENCOUNTER FOR ADJUSTMENT AND MANAGEMENT OF AUTOMATIC IMPLANTABLE CARDIAC DEFIBRILLATOR: ICD-10-CM

## 2022-08-05 DIAGNOSIS — R55 SYNCOPE AND COLLAPSE: ICD-10-CM

## 2022-08-05 DIAGNOSIS — R56.9 UNSPECIFIED CONVULSIONS: ICD-10-CM

## 2022-08-05 DIAGNOSIS — Z95.1 PRESENCE OF AORTOCORONARY BYPASS GRAFT: ICD-10-CM

## 2022-08-05 DIAGNOSIS — R91.1 SOLITARY PULMONARY NODULE: ICD-10-CM

## 2022-08-05 DIAGNOSIS — Z20.822 CONTACT WITH AND (SUSPECTED) EXPOSURE TO COVID-19: ICD-10-CM

## 2022-08-05 DIAGNOSIS — Z95.810 PRESENCE OF AUTOMATIC (IMPLANTABLE) CARDIAC DEFIBRILLATOR: ICD-10-CM

## 2022-08-05 DIAGNOSIS — I25.10 ATHEROSCLEROTIC HEART DISEASE OF NATIVE CORONARY ARTERY WITHOUT ANGINA PECTORIS: ICD-10-CM

## 2022-08-05 DIAGNOSIS — I11.0 HYPERTENSIVE HEART DISEASE WITH HEART FAILURE: ICD-10-CM

## 2022-08-05 DIAGNOSIS — I50.22 CHRONIC SYSTOLIC (CONGESTIVE) HEART FAILURE: ICD-10-CM

## 2022-08-05 DIAGNOSIS — E87.1 HYPO-OSMOLALITY AND HYPONATREMIA: ICD-10-CM

## 2022-08-05 DIAGNOSIS — Z79.82 LONG TERM (CURRENT) USE OF ASPIRIN: ICD-10-CM

## 2022-08-11 ENCOUNTER — APPOINTMENT (OUTPATIENT)
Dept: CARDIOLOGY | Facility: CLINIC | Age: 72
End: 2022-08-11

## 2022-08-11 ENCOUNTER — LABORATORY RESULT (OUTPATIENT)
Age: 72
End: 2022-08-11

## 2022-08-11 ENCOUNTER — OUTPATIENT (OUTPATIENT)
Dept: OUTPATIENT SERVICES | Facility: HOSPITAL | Age: 72
LOS: 1 days | Discharge: HOME | End: 2022-08-11

## 2022-08-11 ENCOUNTER — APPOINTMENT (OUTPATIENT)
Dept: INTERNAL MEDICINE | Facility: CLINIC | Age: 72
End: 2022-08-11

## 2022-08-11 VITALS
HEART RATE: 58 BPM | TEMPERATURE: 98 F | BODY MASS INDEX: 31.28 KG/M2 | DIASTOLIC BLOOD PRESSURE: 60 MMHG | SYSTOLIC BLOOD PRESSURE: 100 MMHG | HEIGHT: 62 IN | WEIGHT: 170 LBS

## 2022-08-11 VITALS
WEIGHT: 177 LBS | HEART RATE: 57 BPM | BODY MASS INDEX: 32.57 KG/M2 | TEMPERATURE: 97 F | SYSTOLIC BLOOD PRESSURE: 123 MMHG | OXYGEN SATURATION: 99 % | DIASTOLIC BLOOD PRESSURE: 76 MMHG | HEIGHT: 62 IN

## 2022-08-11 DIAGNOSIS — R55 SYNCOPE AND COLLAPSE: ICD-10-CM

## 2022-08-11 DIAGNOSIS — Z95.810 PRESENCE OF AUTOMATIC (IMPLANTABLE) CARDIAC DEFIBRILLATOR: Chronic | ICD-10-CM

## 2022-08-11 DIAGNOSIS — Z98.890 OTHER SPECIFIED POSTPROCEDURAL STATES: Chronic | ICD-10-CM

## 2022-08-11 DIAGNOSIS — Z95.1 PRESENCE OF AORTOCORONARY BYPASS GRAFT: Chronic | ICD-10-CM

## 2022-08-11 LAB
ALBUMIN SERPL ELPH-MCNC: 4.7 G/DL
ALP BLD-CCNC: 53 U/L
ALT SERPL-CCNC: 18 U/L
ANION GAP SERPL CALC-SCNC: 12 MMOL/L
AST SERPL-CCNC: 20 U/L
BASOPHILS # BLD AUTO: 0.03 K/UL
BASOPHILS NFR BLD AUTO: 0.5 %
BILIRUB SERPL-MCNC: 0.6 MG/DL
BUN SERPL-MCNC: 14 MG/DL
CALCIUM SERPL-MCNC: 9.8 MG/DL
CHLORIDE SERPL-SCNC: 87 MMOL/L
CHOLEST SERPL-MCNC: 159 MG/DL
CO2 SERPL-SCNC: 25 MMOL/L
CREAT SERPL-MCNC: 0.9 MG/DL
EGFR: 68 ML/MIN/1.73M2
EOSINOPHIL # BLD AUTO: 0.03 K/UL
EOSINOPHIL NFR BLD AUTO: 0.5 %
ESTIMATED AVERAGE GLUCOSE: 108 MG/DL
GLUCOSE SERPL-MCNC: 97 MG/DL
HBA1C MFR BLD HPLC: 5.4 %
HCT VFR BLD CALC: 34.2 %
HDLC SERPL-MCNC: 79 MG/DL
HGB BLD-MCNC: 11.8 G/DL
IMM GRANULOCYTES NFR BLD AUTO: 0.5 %
LDLC SERPL CALC-MCNC: 59 MG/DL
LYMPHOCYTES # BLD AUTO: 1.97 K/UL
LYMPHOCYTES NFR BLD AUTO: 35.2 %
MAGNESIUM SERPL-MCNC: 1.9 MG/DL
MAN DIFF?: NORMAL
MCHC RBC-ENTMCNC: 34 PG
MCHC RBC-ENTMCNC: 34.5 G/DL
MCV RBC AUTO: 98.6 FL
MONOCYTES # BLD AUTO: 0.72 K/UL
MONOCYTES NFR BLD AUTO: 12.9 %
NEUTROPHILS # BLD AUTO: 2.81 K/UL
NEUTROPHILS NFR BLD AUTO: 50.4 %
NONHDLC SERPL-MCNC: 80 MG/DL
PLATELET # BLD AUTO: 265 K/UL
POTASSIUM SERPL-SCNC: 4.9 MMOL/L
PROT SERPL-MCNC: 7 G/DL
RBC # BLD: 3.47 M/UL
RBC # FLD: 12.6 %
SODIUM SERPL-SCNC: 124 MMOL/L
TRIGL SERPL-MCNC: 105 MG/DL
WBC # FLD AUTO: 5.59 K/UL

## 2022-08-11 PROCEDURE — 93282 PRGRMG EVAL IMPLANTABLE DFB: CPT

## 2022-08-11 PROCEDURE — 99214 OFFICE O/P EST MOD 30 MIN: CPT | Mod: GC

## 2022-08-11 PROCEDURE — 99214 OFFICE O/P EST MOD 30 MIN: CPT

## 2022-08-11 PROCEDURE — 93290 INTERROG DEV EVAL ICPMS IP: CPT

## 2022-08-11 NOTE — HISTORY OF PRESENT ILLNESS
[de-identified] : 71 yo F \par \par PMH :  CAD s/p CABG,  ischemic cardiomyopathy s/p AICD , HFrEF 31%, HTN, HLD, BPPV, GERD and pre-DM \par \par presents for follow up after hospitalization for syncope : EEG neg, CT neg , MRI could not do because incompatible AICD : DCed on Keppra with neuro follow up (she has yet ti finalize an appointment ).\par - she also would like refills on her medications - done \par - Blood work during recent admission : + for mild hyponatremia - chronic \par - vitals today : wnl \par \par Patient has been compliant with her medication \par Follows with cardiology and EP.\par She has no active complaints on this visit.\par \par

## 2022-08-11 NOTE — ASSESSMENT
[FreeTextEntry1] : 73 yo F with CAD s/p CABG, systolic CHF with AICD, HFrEF 31%, HTN, HLD, BPPV, GERD and pre-DM presents for follow up after admission for a syncope episode.\par \par # syncope \par - episode 8/1 2022\par - admitted for 1d : CT head L neg, EEg, neg , MRI could not be done AICD not compatible, no events on EP interrogation\par - DCed with Keppra and neuro follow up \par - event has not recurred since \par \par # hypotonic Hyponatremia \par - UOsm 349 SOsm 273 SNa 128\par - likely 2/2 HF/Diuretic use\par \par #Diffuse CAD s/p CABG\par # H/O HTN\par - follows with Cardiology (Dr. Oliva) and EP uptodate\par - nuclear study in 2019: showed reversible defect --> Cath done in Jan 2020 --> patent vessels but CAD---> optimize medical therapy\par - c/w aspirin, Plavix and atorvastatin\par - c/w metoprolol and Ranolazine \par - c/w Isosorbide mononitrate 30 mg QD \par \par #CHFrEF s/p AICD. \par - clinically stable, denies symptoms \par - c/w Aldactone, chlorthalidone\par - c/w Entresto. Will monitor BMP with next medicine visit ---------\par - c/w metoprolol\par - follows with EP\par \par \par # DLD\par - c/w statin \par \par # GERD\par - c/w famotidine q24\par \par # mild hypothyroidism\par - repeat TSH and T4 wnl\par - TSH elevated: 4.46 \par \par # Pre-diabetes\par - A1c 6.0 improve to 5.5 now\par - diet & exercise \par \par #osteopenia on DEXA \par - c/w Calcitrate 950 mg QD \par - repeat DEXA in 2-3 years \par \par # HCM\par - mammogram: July 2020, BiRads 1 \par - colonoscopy: done 2016, normal results \par - pap: > 5 years ago, deferred\par - Dexa July 2020: repeat in 2-3 years \par - PNA vaccine UTD\par \par - RTc in 6 months w/ blood work 1 week prior \par  kg) (5/9/17, actual, weight >1 year ago)  · % Weight Change: 4% gain,  >1 year  · Ideal Body Wt: 120 lb (54.4 kg), % Ideal Body 204%  · Adjusted Body Wt: 141 lb (64 kg), body weight adjusted for Obesity  · BMI Classification: BMI > or equal to 40.0 Obese Class III  · Comparative Standards (Estimated Nutrition Needs):  · Estimated Daily Total Kcal: 3779-8718  · Estimated Daily Protein (g): 82-98    Estimated Intake vs Estimated Needs: Intake Less Than Needs    Nutrition Risk Level: Moderate    Nutrition Interventions:   Continue NPO  Continued Inpatient Monitoring    Nutrition Evaluation:   · Evaluation: Goals set   · Goals: Nutrition as medically feasible (? PN vs. progression of oral diet) to meet needs without complication    · Monitoring: NPO Status, Skin Integrity, Wound Healing, Fluid Balance, Ascites/Edema, Weight, Comparative Standards, Pertinent Labs    See Adult Nutrition Doc Flowsheet for more detail.      Electronically signed by Laura Joyce RD, LD on 7/2/18 at 5:15 PM    Contact Number: Ext. [phone]

## 2022-08-11 NOTE — PHYSICAL EXAM
[Normal] : no acute distress, well nourished, well developed and well-appearing [No Respiratory Distress] : no respiratory distress  [Normal Rate] : normal rate  [Regular Rhythm] : with a regular rhythm [de-identified] : mild basal congestion

## 2022-08-12 DIAGNOSIS — Z00.00 ENCOUNTER FOR GENERAL ADULT MEDICAL EXAMINATION WITHOUT ABNORMAL FINDINGS: ICD-10-CM

## 2022-08-12 DIAGNOSIS — R55 SYNCOPE AND COLLAPSE: ICD-10-CM

## 2022-08-23 ENCOUNTER — NON-APPOINTMENT (OUTPATIENT)
Age: 72
End: 2022-08-23

## 2022-08-23 ENCOUNTER — OUTPATIENT (OUTPATIENT)
Dept: OUTPATIENT SERVICES | Facility: HOSPITAL | Age: 72
LOS: 1 days | Discharge: HOME | End: 2022-08-23

## 2022-08-23 ENCOUNTER — APPOINTMENT (OUTPATIENT)
Dept: CARDIOLOGY | Facility: CLINIC | Age: 72
End: 2022-08-23

## 2022-08-23 VITALS
DIASTOLIC BLOOD PRESSURE: 70 MMHG | SYSTOLIC BLOOD PRESSURE: 114 MMHG | HEART RATE: 85 BPM | BODY MASS INDEX: 32.2 KG/M2 | HEIGHT: 62 IN | WEIGHT: 175 LBS | OXYGEN SATURATION: 99 % | TEMPERATURE: 96.8 F

## 2022-08-23 DIAGNOSIS — Z95.1 PRESENCE OF AORTOCORONARY BYPASS GRAFT: Chronic | ICD-10-CM

## 2022-08-23 DIAGNOSIS — Z95.810 PRESENCE OF AUTOMATIC (IMPLANTABLE) CARDIAC DEFIBRILLATOR: Chronic | ICD-10-CM

## 2022-08-23 DIAGNOSIS — Z98.890 OTHER SPECIFIED POSTPROCEDURAL STATES: Chronic | ICD-10-CM

## 2022-08-23 PROCEDURE — 99214 OFFICE O/P EST MOD 30 MIN: CPT

## 2022-08-23 PROCEDURE — 93010 ELECTROCARDIOGRAM REPORT: CPT

## 2022-08-23 NOTE — PHYSICAL EXAM
[General Appearance - Well Developed] : well developed [Normal Appearance] : normal appearance [Well Groomed] : well groomed [General Appearance - Well Nourished] : well nourished [No Deformities] : no deformities [General Appearance - In No Acute Distress] : no acute distress [Normal Conjunctiva] : the conjunctiva exhibited no abnormalities [Eyelids - No Xanthelasma] : the eyelids demonstrated no xanthelasmas [Normal Oral Mucosa] : normal oral mucosa [No Oral Pallor] : no oral pallor [No Oral Cyanosis] : no oral cyanosis [Normal Jugular Venous A Waves Present] : normal jugular venous A waves present [Normal Jugular Venous V Waves Present] : normal jugular venous V waves present [No Jugular Venous Carver A Waves] : no jugular venous carver A waves [Respiration, Rhythm And Depth] : normal respiratory rhythm and effort [Exaggerated Use Of Accessory Muscles For Inspiration] : no accessory muscle use [Auscultation Breath Sounds / Voice Sounds] : lungs were clear to auscultation bilaterally [Heart Rate And Rhythm] : heart rate and rhythm were normal [Heart Sounds] : normal S1 and S2 [Murmurs] : no murmurs present [Abdomen Soft] : soft [Abdomen Tenderness] : non-tender [Abdomen Mass (___ Cm)] : no abdominal mass palpated [Abnormal Walk] : normal gait [Gait - Sufficient For Exercise Testing] : the gait was sufficient for exercise testing [Nail Clubbing] : no clubbing of the fingernails [Cyanosis, Localized] : no localized cyanosis [Petechial Hemorrhages (___cm)] : no petechial hemorrhages [Skin Color & Pigmentation] : normal skin color and pigmentation [] : no rash [No Venous Stasis] : no venous stasis [Skin Lesions] : no skin lesions [No Skin Ulcers] : no skin ulcer [No Xanthoma] : no  xanthoma was observed [No Gallop] : no gallop

## 2022-08-23 NOTE — HISTORY OF PRESENT ILLNESS
[FreeTextEntry1] : 73 y/o female with history of CAD s/p CABG x 4 (LIMA to LAD, SVG to D2, SVG to RCA, SVG to ramus/M2 in 2011), ischemic cardiomyopathy s/p AICD, HFrEF, HTN, DLD, presented for routine follow up.\par \par she denies any chest pain, SOB, orthopnea, PND, or lower ex edema denies palpitation or CARRERA.\par \par Of note patient was recently discharged from the hospital on 08/03/2022 after presenting for syncope. Her device was interrogated by EP at that time which showed no events. Patient was placed on antiseizure medication given EEG findings and discharged to follow up with neurology \par

## 2022-08-23 NOTE — ASSESSMENT
[FreeTextEntry1] : 71 y/o female with history of CAD s/p CABG x 4 (LIMA to LAD, SVG to D2, SVG to RCA, SVG to ramus/M2 in 2011), ischemic cardiomyopathy s/p AICD, HFrEF, HTN, DLD, presented for routine follow up.\par \par #Diffuse CAD s/p CABG\par # H/O HTN\par - last cath 1/2020 with diffuse CAD\par - c/w aspirin, Plavix and atorvastatin\par - c/w metoprolol and Ranolazine \par - c/w Isosorbide mononitrate 30 mg QD \par - last BMP (08/11) k 4.9, CR 0.9\par \par #CHFrEF s/p AICD [facility] VVI 40 LRL . \par - clinically stable, denies symptoms \par - c/w Aldactone, chlorthalidone\par - c/w Entresto\par - c/w metoprolol\par - F/U with EP for device\par \par - Follow up in 6 months or as needed \par \par \par \par \par \par \par

## 2022-08-23 NOTE — CARDIOLOGY SUMMARY
[de-identified] : EKG: PVCs, poor r wave progression [de-identified] : NST (12/2019): EF 25%, small anteroseptal reversible ischemic area [de-identified] : Echo (08/2022): EF 41% (improved from EF 31%) Grade II DD. Mild MVR.  Mild TR. Multiple WMA\par  [de-identified] : ICD ([employer], 2016; [place]) interrogation 08/2022- no events [de-identified] : Cath (01/2020): diffuse CAD\par 1. 3-vessel coronary artery disease.\par 2. Proximal LAD occlusion with patent LIMA-to-LAD and SVG-to-2nd Diagonal.\par 3. Subtotal 2nd Diagonal occlusion distal to SVG anastomosis with faint\par collateral filling.\par 4. High OM1 occlusion.\par 5. Known SVG-to-RCA and SVG Pyhkxqsfsa-[id number] / OM2 occlusions.\par 6. Patent RCA stent.

## 2022-08-25 DIAGNOSIS — I10 ESSENTIAL (PRIMARY) HYPERTENSION: ICD-10-CM

## 2022-08-25 DIAGNOSIS — I50.22 CHRONIC SYSTOLIC (CONGESTIVE) HEART FAILURE: ICD-10-CM

## 2022-08-25 DIAGNOSIS — I25.10 ATHEROSCLEROTIC HEART DISEASE OF NATIVE CORONARY ARTERY WITHOUT ANGINA PECTORIS: ICD-10-CM

## 2022-08-25 DIAGNOSIS — I25.5 ISCHEMIC CARDIOMYOPATHY: ICD-10-CM

## 2022-08-25 DIAGNOSIS — E78.5 HYPERLIPIDEMIA, UNSPECIFIED: ICD-10-CM

## 2022-09-05 PROBLEM — R55 SYNCOPE: Status: ACTIVE | Noted: 2022-08-11

## 2022-09-05 NOTE — PROCEDURE
[No] : not [NSR] : normal sinus rhythm [See Scanned Paceart Report] : See scanned paceart report [See Device Printout] : See device printout [ICD] : Implantable cardioverter-defibrillator [VVI] : VVI [Impedance: ___ Ohms] : current cell impedance is [unfilled] Ohms [Charge Time: ___ sec] : charge time was [unfilled] seconds [Longevity: ___ months] : The estimated remaining battery life is [unfilled] months [Normal] : The battery status is normal. [Threshold Testing Performed] : Threshold testing was performed [Sensing Amplitude ___mv] : sensing amplitude was [unfilled] mv [___V @] : [unfilled] V [___ ms] : [unfilled] ms [Programmed for Longevity] : output reprogrammed for improved battery longevity [Counters Reset] : the counters were reset [Sense ___ %] : Sense [unfilled]% [Pace ___ %] : Pace [unfilled]% [de-identified] : [employer] [de-identified] : [id number] [de-identified] : [id number] [de-identified] : 09/08/2016 [de-identified] : 40 [de-identified] : No new episodes.\par CorVue is stable.

## 2022-09-05 NOTE — HISTORY OF PRESENT ILLNESS
[None] : The patient complains of no symptoms [Palpitations] : no palpitations [SOB] : no dyspnea [Syncope] : no syncope [Dizziness] : no dizziness [Chest Pain] : no chest pain or discomfort [Erythema at Site] : no erythema at device site [Swelling at Site] : no swelling at device site [de-identified] : Patient with history of CAD, CABG, LV dysfunction, inducible VT s/p ICD implant. Patient comes for follow up. \par  \par The patient complains of no symptoms and dyspnea, no palpitations, no erythema at device site and no swelling at device site.\par \par \par

## 2022-09-05 NOTE — ASSESSMENT
[FreeTextEntry1] : CHF - euvolemic \par - cont entersto 24-26 q12\par \par HTN - controlled\par - cont metoprolol 100 mg qd

## 2022-11-10 ENCOUNTER — FORM ENCOUNTER (OUTPATIENT)
Age: 72
End: 2022-11-10

## 2022-11-10 ENCOUNTER — APPOINTMENT (OUTPATIENT)
Dept: CARDIOLOGY | Facility: CLINIC | Age: 72
End: 2022-11-10

## 2022-11-10 ENCOUNTER — NON-APPOINTMENT (OUTPATIENT)
Age: 72
End: 2022-11-10

## 2022-11-10 PROCEDURE — 93296 REM INTERROG EVL PM/IDS: CPT

## 2022-11-10 PROCEDURE — 93295 DEV INTERROG REMOTE 1/2/MLT: CPT

## 2023-01-03 ENCOUNTER — APPOINTMENT (OUTPATIENT)
Dept: NEUROLOGY | Facility: CLINIC | Age: 73
End: 2023-01-03

## 2023-02-13 ENCOUNTER — OUTPATIENT (OUTPATIENT)
Dept: OUTPATIENT SERVICES | Facility: HOSPITAL | Age: 73
LOS: 1 days | End: 2023-02-13
Payer: MEDICARE

## 2023-02-13 ENCOUNTER — APPOINTMENT (OUTPATIENT)
Dept: INTERNAL MEDICINE | Facility: CLINIC | Age: 73
End: 2023-02-13
Payer: MEDICAID

## 2023-02-13 ENCOUNTER — APPOINTMENT (OUTPATIENT)
Dept: INTERNAL MEDICINE | Facility: CLINIC | Age: 73
End: 2023-02-13

## 2023-02-13 VITALS
DIASTOLIC BLOOD PRESSURE: 71 MMHG | HEIGHT: 62 IN | HEART RATE: 63 BPM | TEMPERATURE: 97.4 F | OXYGEN SATURATION: 97 % | SYSTOLIC BLOOD PRESSURE: 111 MMHG | BODY MASS INDEX: 32.2 KG/M2 | WEIGHT: 175 LBS

## 2023-02-13 DIAGNOSIS — Z00.00 ENCOUNTER FOR GENERAL ADULT MEDICAL EXAMINATION W/OUT ABNORMAL FINDINGS: ICD-10-CM

## 2023-02-13 DIAGNOSIS — Z00.00 ENCOUNTER FOR GENERAL ADULT MEDICAL EXAMINATION WITHOUT ABNORMAL FINDINGS: ICD-10-CM

## 2023-02-13 DIAGNOSIS — Z98.890 OTHER SPECIFIED POSTPROCEDURAL STATES: Chronic | ICD-10-CM

## 2023-02-13 DIAGNOSIS — Z95.810 PRESENCE OF AUTOMATIC (IMPLANTABLE) CARDIAC DEFIBRILLATOR: Chronic | ICD-10-CM

## 2023-02-13 DIAGNOSIS — Z95.1 PRESENCE OF AORTOCORONARY BYPASS GRAFT: Chronic | ICD-10-CM

## 2023-02-13 PROCEDURE — 99214 OFFICE O/P EST MOD 30 MIN: CPT | Mod: GC

## 2023-02-13 PROCEDURE — 99214 OFFICE O/P EST MOD 30 MIN: CPT

## 2023-02-13 NOTE — ASSESSMENT
[FreeTextEntry1] : 71 yo F with CAD s/p CABG, systolic CHF with AICD, HFrEF 31%, HTN, HLD, BPPV, GERD and pre-DM presents for follow up\par \par # syncope \par - episode 8/1 2022\par - admitted for 1d : CT head L neg, EEg, neg , MRI could not be done AICD not compatible, no events on EP interrogation\par - DCed with Keppra and neuro follow up \par - event has not recurred since \par \par # hypotonic Hyponatremia \par - UOsm 349 SOsm 273 SNa 128\par - likely 2/2 HF/Diuretic use\par \par #Diffuse CAD s/p CABG\par # H/O HTN\par - follows with Cardiology (Dr. Oliva)---> will likely f/u c Dr. Behuria moving forward; and EP uptodate\par - nuclear study in 2019: showed reversible defect --> Cath done in Jan 2020 --> patent vessels but CAD---> optimize medical therapy\par - c/w aspirin, Plavix and atorvastatin\par - c/w metoprolol and Ranolazine \par - c/w Isosorbide mononitrate 30 mg QD \par \par #CHFrEF s/p AICD. \par - clinically stable, denies symptoms \par - c/w Aldactone, chlorthalidone\par - c/w Entresto. \par - c/w metoprolol\par - follows with EP\par euvolemic and compensated on exam today\par \par \par # DLD\par - c/w statin \par Hyperlipidemia;\par Low fat, low cholesterol diet.\par Discussed importance of eating a heart healthy diet\par Counseled on aerobic exercise and weight loss\par Fasting lipid panel every 3 months\par Treatment options and possible side effects discussed \par Smoking cessation discussed, if applicable\par Patient counseled on effects of ETOH on lipid levels\par \par \par # GERD\par - c/w famotidine q24\par GERD;\par Anti-reflux diet d/w patient\par Avoid laying down after meals\par Smoking cessation encouraged, if applicable\par Counseled on use of extra pillows at night time to elevate position\par Discussed treatment options and all side effects\par \par \par # mild hypothyroidism\par - repeat TSH and T4 wnl\par - TSH elevated: 4.46 \par \par # Pre-diabetes\par - A1c 6.0 improve to 5.5 now\par - diet & exercise \par preDiabetes;\par Low sugar, low carbohydrate diet \par Exercise Counseled \par Patient given opportunity to discuss frequency and target blood sugar levels\par Patient educated on symptoms of hypo/hyperglycemic events \par Counseled on: Yearly Ophthalmology and Podiatry Exam\par \par \par #osteopenia on DEXA \par - c/w Calcitrate 950 mg QD \par - repeat DEXA in 2-3 years \par Osteoporosis/Osteopenia;\par Ensure adequate dietary calcium and Vitamin D intake, or supplement if deficient. \par Annual height measurement\par Weight bearing exercises\par Avoid smoking\par Limit alcohol and caffeine\par Limit PPI use\par 30 minutes of sunlight exposure on at least 5 days per week\par Fall Prevention discussed\par \par \par # HCM\par - mammogram: July 2020, BiRads 1 \par Mammogram Cancer Screening;\par Patient counseled on the importance of a yearly mammogram screening and directed to have test performed or follow-up with OB/GYN. Failure to perform test can result in breast cancer and death\par \par - colonoscopy: done 2016, normal results \par - pap: > 5 years ago, deferred\par - Dexa July 2020: repeat in 2-3 years \par - PNA vaccine UTD\par \par - RTc in 6 months w/ blood work 1 week prior \par

## 2023-02-13 NOTE — HISTORY OF PRESENT ILLNESS
[FreeTextEntry1] : follow up [de-identified] : 72 F w/ PMH :  CAD s/p CABG,  ischemic cardiomyopathy s/p AICD , HFrEF 31%, HTN, HLD, BPPV, GERD and pre-DM \par Presents for follow up Patient has been compliant with her medication Follows with cardiology and EP. She has no active complaints on this visit.\par \par

## 2023-02-13 NOTE — PHYSICAL EXAM
[No Acute Distress] : no acute distress [Well Nourished] : well nourished [Well Developed] : well developed [Well-Appearing] : well-appearing [Normal Sclera/Conjunctiva] : normal sclera/conjunctiva [PERRL] : pupils equal round and reactive to light [EOMI] : extraocular movements intact [No JVD] : no jugular venous distention [No Respiratory Distress] : no respiratory distress  [Normal Rate] : normal rate  [Regular Rhythm] : with a regular rhythm [Soft] : abdomen soft [Non Tender] : non-tender [Non-distended] : non-distended [No Focal Deficits] : no focal deficits [Normal Insight/Judgement] : insight and judgment were intact [de-identified] : mild basal congestion  [de-identified] : sternotomy scar

## 2023-02-16 ENCOUNTER — APPOINTMENT (OUTPATIENT)
Dept: CARDIOLOGY | Facility: CLINIC | Age: 73
End: 2023-02-16
Payer: MEDICAID

## 2023-02-16 ENCOUNTER — NON-APPOINTMENT (OUTPATIENT)
Age: 73
End: 2023-02-16

## 2023-02-16 PROCEDURE — 93296 REM INTERROG EVL PM/IDS: CPT

## 2023-02-16 PROCEDURE — 93295 DEV INTERROG REMOTE 1/2/MLT: CPT

## 2023-02-17 DIAGNOSIS — I10 ESSENTIAL (PRIMARY) HYPERTENSION: ICD-10-CM

## 2023-02-17 DIAGNOSIS — E78.5 HYPERLIPIDEMIA, UNSPECIFIED: ICD-10-CM

## 2023-02-17 DIAGNOSIS — I50.22 CHRONIC SYSTOLIC (CONGESTIVE) HEART FAILURE: ICD-10-CM

## 2023-02-17 DIAGNOSIS — Z00.00 ENCOUNTER FOR GENERAL ADULT MEDICAL EXAMINATION WITHOUT ABNORMAL FINDINGS: ICD-10-CM

## 2023-02-28 ENCOUNTER — APPOINTMENT (OUTPATIENT)
Dept: CARDIOLOGY | Facility: CLINIC | Age: 73
End: 2023-02-28

## 2023-03-15 ENCOUNTER — OUTPATIENT (OUTPATIENT)
Dept: OUTPATIENT SERVICES | Facility: HOSPITAL | Age: 73
LOS: 1 days | End: 2023-03-15
Payer: MEDICARE

## 2023-03-15 ENCOUNTER — RESULT REVIEW (OUTPATIENT)
Age: 73
End: 2023-03-15

## 2023-03-15 DIAGNOSIS — Z95.1 PRESENCE OF AORTOCORONARY BYPASS GRAFT: Chronic | ICD-10-CM

## 2023-03-15 DIAGNOSIS — Z13.820 ENCOUNTER FOR SCREENING FOR OSTEOPOROSIS: ICD-10-CM

## 2023-03-15 DIAGNOSIS — M85.80 OTHER SPECIFIED DISORDERS OF BONE DENSITY AND STRUCTURE, UNSPECIFIED SITE: ICD-10-CM

## 2023-03-15 DIAGNOSIS — Z98.890 OTHER SPECIFIED POSTPROCEDURAL STATES: Chronic | ICD-10-CM

## 2023-03-15 DIAGNOSIS — Z95.810 PRESENCE OF AUTOMATIC (IMPLANTABLE) CARDIAC DEFIBRILLATOR: Chronic | ICD-10-CM

## 2023-03-15 PROCEDURE — 77080 DXA BONE DENSITY AXIAL: CPT

## 2023-03-16 DIAGNOSIS — Z13.820 ENCOUNTER FOR SCREENING FOR OSTEOPOROSIS: ICD-10-CM

## 2023-03-20 DIAGNOSIS — M85.80 OTHER SPECIFIED DISORDERS OF BONE DENSITY AND STRUCTURE, UNSPECIFIED SITE: ICD-10-CM

## 2023-03-21 ENCOUNTER — NON-APPOINTMENT (OUTPATIENT)
Age: 73
End: 2023-03-21

## 2023-05-18 ENCOUNTER — APPOINTMENT (OUTPATIENT)
Dept: CARDIOLOGY | Facility: CLINIC | Age: 73
End: 2023-05-18
Payer: MEDICAID

## 2023-05-18 ENCOUNTER — NON-APPOINTMENT (OUTPATIENT)
Age: 73
End: 2023-05-18

## 2023-05-18 PROCEDURE — 93295 DEV INTERROG REMOTE 1/2/MLT: CPT

## 2023-05-18 PROCEDURE — 93296 REM INTERROG EVL PM/IDS: CPT

## 2023-05-19 NOTE — ED CDU PROVIDER INITIAL DAY NOTE - CHIEF COMPLAINT
The patient is a 69y Female complaining of chest pain. Infliximab Counseling:  I discussed with the patient the risks of infliximab including but not limited to myelosuppression, immunosuppression, autoimmune hepatitis, demyelinating diseases, lymphoma, and serious infections.  The patient understands that monitoring is required including a PPD at baseline and must alert us or the primary physician if symptoms of infection or other concerning signs are noted.

## 2023-05-30 ENCOUNTER — APPOINTMENT (OUTPATIENT)
Dept: INTERNAL MEDICINE | Facility: CLINIC | Age: 73
End: 2023-05-30
Payer: MEDICAID

## 2023-05-30 ENCOUNTER — OUTPATIENT (OUTPATIENT)
Dept: OUTPATIENT SERVICES | Facility: HOSPITAL | Age: 73
LOS: 1 days | End: 2023-05-30
Payer: MEDICARE

## 2023-05-30 VITALS
TEMPERATURE: 93.9 F | HEART RATE: 65 BPM | OXYGEN SATURATION: 98 % | WEIGHT: 185 LBS | SYSTOLIC BLOOD PRESSURE: 110 MMHG | BODY MASS INDEX: 34.04 KG/M2 | DIASTOLIC BLOOD PRESSURE: 75 MMHG | HEIGHT: 62 IN

## 2023-05-30 DIAGNOSIS — Z95.810 PRESENCE OF AUTOMATIC (IMPLANTABLE) CARDIAC DEFIBRILLATOR: Chronic | ICD-10-CM

## 2023-05-30 DIAGNOSIS — E87.1 HYPO-OSMOLALITY AND HYPONATREMIA: ICD-10-CM

## 2023-05-30 DIAGNOSIS — Z95.1 PRESENCE OF AORTOCORONARY BYPASS GRAFT: Chronic | ICD-10-CM

## 2023-05-30 DIAGNOSIS — Z98.890 OTHER SPECIFIED POSTPROCEDURAL STATES: Chronic | ICD-10-CM

## 2023-05-30 DIAGNOSIS — Z00.00 ENCOUNTER FOR GENERAL ADULT MEDICAL EXAMINATION WITHOUT ABNORMAL FINDINGS: ICD-10-CM

## 2023-05-30 PROCEDURE — 99214 OFFICE O/P EST MOD 30 MIN: CPT

## 2023-05-30 PROCEDURE — 99214 OFFICE O/P EST MOD 30 MIN: CPT | Mod: GC

## 2023-05-30 NOTE — HISTORY OF PRESENT ILLNESS
[FreeTextEntry1] : sick visit- c/o diffuse muscle pain [de-identified] : c/o generalized muscle pain > 2 years, impairing sleeping, involving b/l knees, shoulders, thighs. patient tries to wrap b/l LE's but this no longer provides significant relief. has tried otc tylenol previously, but doesn’t like taking po analgesia as she claims it has ill effect on her mentation. denies any depression

## 2023-05-30 NOTE — ASSESSMENT
[FreeTextEntry1] : diffuse myalgias leading to insomnia\par check cpk\par check TFTs\par check Mg lvl\par check iron stores\par \par abnormal tsh\par repeat tfts in light of diffuse myalgias\par \par Osteoporosis/Osteopenia;\par Ensure adequate dietary calcium and Vitamin D intake, or supplement if deficient. \par Annual height measurement\par Weight bearing exercises\par Avoid smoking\par Limit alcohol and caffeine\par Limit PPI use\par 30 minutes of sunlight exposure on at least 5 days per week\par Fall Prevention discussed\par \par cad \par c/w cardio regimen\par \par chronic systolic hf\par stable euvolemic\par c/w cardio regimen\par \par htn controlled\par HTN;\par DASH diet discussed and recommended\par Exercise and weight loss counseled \par Frequency and target at home BP readings discussed\par Decrease caffeine intake\par Treatment options and possible side effects discussed\par Patient counseled on symptoms of hypo/hypertension\par Counseled: Yearly Ophthalmology exams\par \par \par discussed on prior visit in february:\par hcm\par - mammogram: July 2020, BiRads 1 \par Mammogram Cancer Screening;\par Patient counseled on the importance of a yearly mammogram screening and directed to have test performed or follow-up with OB/GYN. Failure to perform test can result in breast cancer and death\par \par - colonoscopy: done 2016, normal results \par - pap: > 5 years ago, deferred\par - Dexa July 2020: repeat in 2-3 years \par  Detail Level: Detailed Quality 130: Documentation Of Current Medications In The Medical Record: Current Medications Documented

## 2023-05-30 NOTE — INTERPRETER SERVICES
[[employer] ] : provided by [employer]   [Time Spent: ____ minutes] : Total time spent using  services: [unfilled] minutes. The patient's primary language is not English thus required  services. [Interpreters_IDNumber] : [id number] [Interpreters_FullName] : kathryn [TWNoteComboBox1] : Zack

## 2023-06-06 ENCOUNTER — APPOINTMENT (OUTPATIENT)
Dept: NEUROLOGY | Facility: CLINIC | Age: 73
End: 2023-06-06

## 2023-06-13 DIAGNOSIS — R79.89 OTHER SPECIFIED ABNORMAL FINDINGS OF BLOOD CHEMISTRY: ICD-10-CM

## 2023-06-13 DIAGNOSIS — I50.22 CHRONIC SYSTOLIC (CONGESTIVE) HEART FAILURE: ICD-10-CM

## 2023-06-13 DIAGNOSIS — M79.10 MYALGIA, UNSPECIFIED SITE: ICD-10-CM

## 2023-06-13 DIAGNOSIS — E87.1 HYPO-OSMOLALITY AND HYPONATREMIA: ICD-10-CM

## 2023-06-13 DIAGNOSIS — K21.9 GASTRO-ESOPHAGEAL REFLUX DISEASE WITHOUT ESOPHAGITIS: ICD-10-CM

## 2023-06-13 DIAGNOSIS — I25.10 ATHEROSCLEROTIC HEART DISEASE OF NATIVE CORONARY ARTERY WITHOUT ANGINA PECTORIS: ICD-10-CM

## 2023-06-13 DIAGNOSIS — I10 ESSENTIAL (PRIMARY) HYPERTENSION: ICD-10-CM

## 2023-07-13 ENCOUNTER — APPOINTMENT (OUTPATIENT)
Dept: ELECTROPHYSIOLOGY | Facility: CLINIC | Age: 73
End: 2023-07-13
Payer: MEDICAID

## 2023-07-13 ENCOUNTER — NON-APPOINTMENT (OUTPATIENT)
Age: 73
End: 2023-07-13

## 2023-07-13 VITALS
HEART RATE: 55 BPM | DIASTOLIC BLOOD PRESSURE: 70 MMHG | RESPIRATION RATE: 16 BRPM | HEIGHT: 62 IN | SYSTOLIC BLOOD PRESSURE: 110 MMHG | WEIGHT: 186 LBS | TEMPERATURE: 97.1 F | BODY MASS INDEX: 34.23 KG/M2

## 2023-07-13 DIAGNOSIS — Z45.02 ENCOUNTER FOR ADJUSTMENT AND MANAGEMENT OF AUTOMATIC IMPLANTABLE CARDIAC DEFIBRILLATOR: ICD-10-CM

## 2023-07-13 PROCEDURE — 93290 INTERROG DEV EVAL ICPMS IP: CPT

## 2023-07-13 PROCEDURE — 93282 PRGRMG EVAL IMPLANTABLE DFB: CPT

## 2023-07-20 ENCOUNTER — APPOINTMENT (OUTPATIENT)
Dept: ELECTROPHYSIOLOGY | Facility: CLINIC | Age: 73
End: 2023-07-20

## 2023-07-24 ENCOUNTER — LABORATORY RESULT (OUTPATIENT)
Age: 73
End: 2023-07-24

## 2023-07-24 ENCOUNTER — OUTPATIENT (OUTPATIENT)
Dept: OUTPATIENT SERVICES | Facility: HOSPITAL | Age: 73
LOS: 1 days | End: 2023-07-24
Payer: MEDICARE

## 2023-07-24 DIAGNOSIS — Z00.00 ENCOUNTER FOR GENERAL ADULT MEDICAL EXAMINATION WITHOUT ABNORMAL FINDINGS: ICD-10-CM

## 2023-07-24 DIAGNOSIS — Z95.1 PRESENCE OF AORTOCORONARY BYPASS GRAFT: Chronic | ICD-10-CM

## 2023-07-24 DIAGNOSIS — Z98.890 OTHER SPECIFIED POSTPROCEDURAL STATES: Chronic | ICD-10-CM

## 2023-07-24 DIAGNOSIS — Z95.810 PRESENCE OF AUTOMATIC (IMPLANTABLE) CARDIAC DEFIBRILLATOR: Chronic | ICD-10-CM

## 2023-07-24 PROCEDURE — 84439 ASSAY OF FREE THYROXINE: CPT

## 2023-07-24 PROCEDURE — 80053 COMPREHEN METABOLIC PANEL: CPT

## 2023-07-24 PROCEDURE — 81001 URINALYSIS AUTO W/SCOPE: CPT

## 2023-07-24 PROCEDURE — 80061 LIPID PANEL: CPT

## 2023-07-24 PROCEDURE — 85027 COMPLETE CBC AUTOMATED: CPT

## 2023-07-24 PROCEDURE — 83540 ASSAY OF IRON: CPT

## 2023-07-24 PROCEDURE — 83735 ASSAY OF MAGNESIUM: CPT

## 2023-07-24 PROCEDURE — 83550 IRON BINDING TEST: CPT

## 2023-07-24 PROCEDURE — 82728 ASSAY OF FERRITIN: CPT

## 2023-07-24 PROCEDURE — 83036 HEMOGLOBIN GLYCOSYLATED A1C: CPT

## 2023-07-24 PROCEDURE — 82306 VITAMIN D 25 HYDROXY: CPT

## 2023-07-24 PROCEDURE — 83880 ASSAY OF NATRIURETIC PEPTIDE: CPT

## 2023-07-24 PROCEDURE — 86140 C-REACTIVE PROTEIN: CPT

## 2023-07-24 PROCEDURE — 85652 RBC SED RATE AUTOMATED: CPT

## 2023-07-24 PROCEDURE — 82550 ASSAY OF CK (CPK): CPT

## 2023-07-24 PROCEDURE — 84443 ASSAY THYROID STIM HORMONE: CPT

## 2023-07-24 PROCEDURE — 86038 ANTINUCLEAR ANTIBODIES: CPT

## 2023-07-25 ENCOUNTER — NON-APPOINTMENT (OUTPATIENT)
Age: 73
End: 2023-07-25

## 2023-07-25 DIAGNOSIS — E83.42 HYPOMAGNESEMIA: ICD-10-CM

## 2023-07-25 DIAGNOSIS — Z00.00 ENCOUNTER FOR GENERAL ADULT MEDICAL EXAMINATION WITHOUT ABNORMAL FINDINGS: ICD-10-CM

## 2023-07-25 LAB
25(OH)D3 SERPL-MCNC: 30 NG/ML
ALBUMIN SERPL ELPH-MCNC: 4.6 G/DL
ALP BLD-CCNC: 101 U/L
ALT SERPL-CCNC: 15 U/L
ANA SER IF-ACNC: NEGATIVE
ANION GAP SERPL CALC-SCNC: 12 MMOL/L
AST SERPL-CCNC: 18 U/L
BILIRUB SERPL-MCNC: 0.4 MG/DL
BUN SERPL-MCNC: 23 MG/DL
CALCIUM SERPL-MCNC: 9.7 MG/DL
CHLORIDE SERPL-SCNC: 92 MMOL/L
CHOLEST SERPL-MCNC: 183 MG/DL
CK SERPL-CCNC: 60 U/L
CO2 SERPL-SCNC: 22 MMOL/L
CREAT SERPL-MCNC: 1 MG/DL
CRP SERPL-MCNC: <3 MG/L
EGFR: 60 ML/MIN/1.73M2
ERYTHROCYTE [SEDIMENTATION RATE] IN BLOOD BY WESTERGREN METHOD: 12 MM/HR
ESTIMATED AVERAGE GLUCOSE: 128 MG/DL
FERRITIN SERPL-MCNC: 101 NG/ML
GLUCOSE SERPL-MCNC: 99 MG/DL
HBA1C MFR BLD HPLC: 6.1 %
HDLC SERPL-MCNC: 94 MG/DL
IRON SATN MFR SERPL: 31 %
IRON SERPL-MCNC: 102 UG/DL
LDLC SERPL CALC-MCNC: 64 MG/DL
MAGNESIUM SERPL-MCNC: 1.5 MG/DL
NONHDLC SERPL-MCNC: 89 MG/DL
NT-PROBNP SERPL-MCNC: 497 PG/ML
POTASSIUM SERPL-SCNC: 4 MMOL/L
PROT SERPL-MCNC: 7 G/DL
SODIUM SERPL-SCNC: 126 MMOL/L
TIBC SERPL-MCNC: 333 UG/DL
TRIGL SERPL-MCNC: 126 MG/DL
TSH SERPL-ACNC: 4.37 UIU/ML
UIBC SERPL-MCNC: 231 UG/DL

## 2023-07-28 ENCOUNTER — APPOINTMENT (OUTPATIENT)
Dept: CARDIOLOGY | Facility: CLINIC | Age: 73
End: 2023-07-28
Payer: MEDICAID

## 2023-07-28 VITALS
HEIGHT: 62 IN | DIASTOLIC BLOOD PRESSURE: 60 MMHG | HEART RATE: 83 BPM | WEIGHT: 183 LBS | BODY MASS INDEX: 33.68 KG/M2 | SYSTOLIC BLOOD PRESSURE: 100 MMHG

## 2023-07-28 PROCEDURE — 99214 OFFICE O/P EST MOD 30 MIN: CPT

## 2023-07-28 PROCEDURE — 93000 ELECTROCARDIOGRAM COMPLETE: CPT

## 2023-07-28 NOTE — HISTORY OF PRESENT ILLNESS
[FreeTextEntry1] : Known from prior clinic coverage.  Last seen 2020.  Following with Dr. Oliva in interim.\par \par CAD s/p CABG\par ICM s/p AICD\par Chronic Systolic CHF\par \par CATH (1/2020): Diffuse disease. Patent grafts. No good revascularization targets.\par ECHO (1/9/20): EF 31%. Mild MR.\par \par Overall, feels well.\par \par Progressive exertional dyspnea.  Walks 30 to 40 minutes at a casual pace.  Dyspnea with stairs and brisk pace.\par \par No chest pain.\par \par Tolerating medications.\par \par Labs reviewed (6/2023).  CBC / CMP unremarkable.

## 2023-07-28 NOTE — ASSESSMENT
[FreeTextEntry1] : CAD s/p CABG\par Angina controlled.  Poor revascularization targets.\par \par ICM s/p AICD\par Severe LV Dysfunction\par \par Chronic Systolic CHF\par Relatively compensated

## 2023-07-28 NOTE — PHYSICAL EXAM
[de-identified] : well appearing, overweight, no distress [de-identified] : reg, nL s1/s2, no m/r/g [de-identified] : CTA [de-identified] : warm, no edema [de-identified] : alert, normal affect, logical conversation

## 2023-07-28 NOTE — DISCUSSION/SUMMARY
[FreeTextEntry1] : Cont ASA / Plavix\par Cont Lipitor\par Cont Entresto, Toprol, Spironolactone\par Cont Chlorthalidone\par Cont Imdur / Ranexa\par ECHO\par Follow-up 3-months

## 2023-08-07 ENCOUNTER — APPOINTMENT (OUTPATIENT)
Dept: CARDIOLOGY | Facility: CLINIC | Age: 73
End: 2023-08-07
Payer: MEDICAID

## 2023-08-07 PROCEDURE — 93306 TTE W/DOPPLER COMPLETE: CPT

## 2023-08-17 ENCOUNTER — OUTPATIENT (OUTPATIENT)
Dept: OUTPATIENT SERVICES | Facility: HOSPITAL | Age: 73
LOS: 1 days | End: 2023-08-17
Payer: MEDICARE

## 2023-08-17 ENCOUNTER — RESULT REVIEW (OUTPATIENT)
Age: 73
End: 2023-08-17

## 2023-08-17 DIAGNOSIS — Z98.890 OTHER SPECIFIED POSTPROCEDURAL STATES: Chronic | ICD-10-CM

## 2023-08-17 DIAGNOSIS — Z95.810 PRESENCE OF AUTOMATIC (IMPLANTABLE) CARDIAC DEFIBRILLATOR: Chronic | ICD-10-CM

## 2023-08-17 DIAGNOSIS — Z12.31 ENCOUNTER FOR SCREENING MAMMOGRAM FOR MALIGNANT NEOPLASM OF BREAST: ICD-10-CM

## 2023-08-17 DIAGNOSIS — Z95.1 PRESENCE OF AORTOCORONARY BYPASS GRAFT: Chronic | ICD-10-CM

## 2023-08-17 PROCEDURE — 77067 SCR MAMMO BI INCL CAD: CPT

## 2023-08-17 PROCEDURE — 77067 SCR MAMMO BI INCL CAD: CPT | Mod: 26

## 2023-08-17 PROCEDURE — 77063 BREAST TOMOSYNTHESIS BI: CPT | Mod: 26

## 2023-08-17 PROCEDURE — 77063 BREAST TOMOSYNTHESIS BI: CPT

## 2023-08-18 DIAGNOSIS — Z12.31 ENCOUNTER FOR SCREENING MAMMOGRAM FOR MALIGNANT NEOPLASM OF BREAST: ICD-10-CM

## 2023-08-22 NOTE — HISTORY OF PRESENT ILLNESS
A (GUIDEWIRE HTORQ BAL MDWT UNV 3CM 190CM STRGT .014IN VASC) guidewire was introduced. [FreeTextEntry1] : 70y/o female with history of CAD s/p CABG x 4 (LIMA to LAD, SVG to D2, SVG to RCA, SVG to ramus/M2 in 2011), ischemic cardiomyopathy s/p AICD, HFrEF, HTN, DLD, presented for routine follow up.\par \par she denies any chest pain, SOB, orthopnea, PND, or lower ex edema denies palpitation or CARRERA. Recent EP AICD interrogation without new episodes.\par \par NST (12/2019): EF 25%, small anteroseptal reversible ischemic area\par Cath (01/2020): diffuse CAD\par Echo (01/2020): EF 31% mild MR\par ICD ([facility], 2016; [facility]) interrogation 10/2020 - no events, will see dr vasquez in april \par in Sep 2020, at that time had some MSK pains so statin dec to 40. \par \par Cath 1/2020:\par 1. 3-vessel coronary artery disease.\par 2. Proximal LAD occlusion with patent LIMA-to-LAD and SVG-to-2nd Diagonal.\par 3. Subtotal 2nd Diagonal occlusion distal to SVG anastomosis with faint\par collateral filling.\par 4. High OM1 occlusion.\par 5. Known SVG-to-RCA and SVG Lzkaoiakvw-[id number]-[id number] / OM2 occlusions.\par 6. Patent RCA stent.\par EKG (2/2021): sinus rhythm 1' AV block, PVC, LVH mod\par \par patient is on aspirin, plavix, statin, Entresto, chlorthalidone, Aldactone, isosorbid mononitrate, ranolazine and metoprolol. Requesting medication renewal today.\par

## 2023-09-11 ENCOUNTER — APPOINTMENT (OUTPATIENT)
Dept: INTERNAL MEDICINE | Facility: CLINIC | Age: 73
End: 2023-09-11
Payer: MEDICAID

## 2023-09-11 ENCOUNTER — OUTPATIENT (OUTPATIENT)
Dept: OUTPATIENT SERVICES | Facility: HOSPITAL | Age: 73
LOS: 1 days | End: 2023-09-11
Payer: MEDICAID

## 2023-09-11 VITALS
WEIGHT: 189 LBS | HEART RATE: 71 BPM | SYSTOLIC BLOOD PRESSURE: 106 MMHG | BODY MASS INDEX: 34.78 KG/M2 | HEIGHT: 62 IN | DIASTOLIC BLOOD PRESSURE: 76 MMHG | OXYGEN SATURATION: 97 %

## 2023-09-11 DIAGNOSIS — Z95.810 PRESENCE OF AUTOMATIC (IMPLANTABLE) CARDIAC DEFIBRILLATOR: Chronic | ICD-10-CM

## 2023-09-11 DIAGNOSIS — Z00.00 ENCOUNTER FOR GENERAL ADULT MEDICAL EXAMINATION WITHOUT ABNORMAL FINDINGS: ICD-10-CM

## 2023-09-11 DIAGNOSIS — D75.89 OTHER SPECIFIED DISEASES OF BLOOD AND BLOOD-FORMING ORGANS: ICD-10-CM

## 2023-09-11 DIAGNOSIS — D53.9 NUTRITIONAL ANEMIA, UNSPECIFIED: ICD-10-CM

## 2023-09-11 DIAGNOSIS — R73.9 HYPERGLYCEMIA, UNSPECIFIED: ICD-10-CM

## 2023-09-11 DIAGNOSIS — Z95.1 PRESENCE OF AORTOCORONARY BYPASS GRAFT: Chronic | ICD-10-CM

## 2023-09-11 PROCEDURE — 99214 OFFICE O/P EST MOD 30 MIN: CPT | Mod: GC

## 2023-09-11 PROCEDURE — 99214 OFFICE O/P EST MOD 30 MIN: CPT

## 2023-09-15 ENCOUNTER — APPOINTMENT (OUTPATIENT)
Dept: CARDIOLOGY | Facility: CLINIC | Age: 73
End: 2023-09-15
Payer: MEDICAID

## 2023-09-15 VITALS
WEIGHT: 190 LBS | SYSTOLIC BLOOD PRESSURE: 110 MMHG | DIASTOLIC BLOOD PRESSURE: 64 MMHG | BODY MASS INDEX: 34.96 KG/M2 | HEIGHT: 62 IN | HEART RATE: 58 BPM

## 2023-09-15 PROCEDURE — 99214 OFFICE O/P EST MOD 30 MIN: CPT

## 2023-09-15 PROCEDURE — 93000 ELECTROCARDIOGRAM COMPLETE: CPT

## 2023-09-18 DIAGNOSIS — R73.9 HYPERGLYCEMIA, UNSPECIFIED: ICD-10-CM

## 2023-09-18 DIAGNOSIS — E78.5 HYPERLIPIDEMIA, UNSPECIFIED: ICD-10-CM

## 2023-09-18 DIAGNOSIS — D75.89 OTHER SPECIFIED DISEASES OF BLOOD AND BLOOD-FORMING ORGANS: ICD-10-CM

## 2023-09-18 DIAGNOSIS — E66.9 OBESITY, UNSPECIFIED: ICD-10-CM

## 2023-09-18 DIAGNOSIS — M79.10 MYALGIA, UNSPECIFIED SITE: ICD-10-CM

## 2023-09-18 DIAGNOSIS — I25.10 ATHEROSCLEROTIC HEART DISEASE OF NATIVE CORONARY ARTERY WITHOUT ANGINA PECTORIS: ICD-10-CM

## 2023-09-18 DIAGNOSIS — I10 ESSENTIAL (PRIMARY) HYPERTENSION: ICD-10-CM

## 2023-10-23 ENCOUNTER — APPOINTMENT (OUTPATIENT)
Dept: CARDIOLOGY | Facility: CLINIC | Age: 73
End: 2023-10-23
Payer: MEDICAID

## 2023-10-23 ENCOUNTER — NON-APPOINTMENT (OUTPATIENT)
Age: 73
End: 2023-10-23

## 2023-10-23 PROCEDURE — 93296 REM INTERROG EVL PM/IDS: CPT

## 2023-10-23 PROCEDURE — 93295 DEV INTERROG REMOTE 1/2/MLT: CPT

## 2023-11-14 ENCOUNTER — APPOINTMENT (OUTPATIENT)
Dept: NEUROLOGY | Facility: CLINIC | Age: 73
End: 2023-11-14

## 2023-12-11 ENCOUNTER — OUTPATIENT (OUTPATIENT)
Dept: OUTPATIENT SERVICES | Facility: HOSPITAL | Age: 73
LOS: 1 days | End: 2023-12-11
Payer: MEDICAID

## 2023-12-11 ENCOUNTER — LABORATORY RESULT (OUTPATIENT)
Age: 73
End: 2023-12-11

## 2023-12-11 DIAGNOSIS — R79.89 OTHER SPECIFIED ABNORMAL FINDINGS OF BLOOD CHEMISTRY: ICD-10-CM

## 2023-12-11 DIAGNOSIS — E78.5 HYPERLIPIDEMIA, UNSPECIFIED: ICD-10-CM

## 2023-12-11 DIAGNOSIS — I10 ESSENTIAL (PRIMARY) HYPERTENSION: ICD-10-CM

## 2023-12-11 DIAGNOSIS — E83.42 HYPOMAGNESEMIA: ICD-10-CM

## 2023-12-11 DIAGNOSIS — R73.9 HYPERGLYCEMIA, UNSPECIFIED: ICD-10-CM

## 2023-12-11 DIAGNOSIS — Z98.890 OTHER SPECIFIED POSTPROCEDURAL STATES: Chronic | ICD-10-CM

## 2023-12-11 DIAGNOSIS — Z95.810 PRESENCE OF AUTOMATIC (IMPLANTABLE) CARDIAC DEFIBRILLATOR: Chronic | ICD-10-CM

## 2023-12-11 DIAGNOSIS — D75.89 OTHER SPECIFIED DISEASES OF BLOOD AND BLOOD-FORMING ORGANS: ICD-10-CM

## 2023-12-11 DIAGNOSIS — E87.1 HYPO-OSMOLALITY AND HYPONATREMIA: ICD-10-CM

## 2023-12-11 DIAGNOSIS — Z95.1 PRESENCE OF AORTOCORONARY BYPASS GRAFT: Chronic | ICD-10-CM

## 2023-12-11 PROCEDURE — 82746 ASSAY OF FOLIC ACID SERUM: CPT

## 2023-12-11 PROCEDURE — 84443 ASSAY THYROID STIM HORMONE: CPT

## 2023-12-11 PROCEDURE — 82607 VITAMIN B-12: CPT

## 2023-12-11 PROCEDURE — 80061 LIPID PANEL: CPT

## 2023-12-11 PROCEDURE — 84439 ASSAY OF FREE THYROXINE: CPT

## 2023-12-11 PROCEDURE — 85027 COMPLETE CBC AUTOMATED: CPT

## 2023-12-11 PROCEDURE — 80053 COMPREHEN METABOLIC PANEL: CPT

## 2023-12-12 DIAGNOSIS — R79.89 OTHER SPECIFIED ABNORMAL FINDINGS OF BLOOD CHEMISTRY: ICD-10-CM

## 2023-12-12 DIAGNOSIS — I10 ESSENTIAL (PRIMARY) HYPERTENSION: ICD-10-CM

## 2023-12-12 DIAGNOSIS — D75.89 OTHER SPECIFIED DISEASES OF BLOOD AND BLOOD-FORMING ORGANS: ICD-10-CM

## 2023-12-12 DIAGNOSIS — E78.5 HYPERLIPIDEMIA, UNSPECIFIED: ICD-10-CM

## 2023-12-12 DIAGNOSIS — E83.42 HYPOMAGNESEMIA: ICD-10-CM

## 2023-12-12 DIAGNOSIS — E87.1 HYPO-OSMOLALITY AND HYPONATREMIA: ICD-10-CM

## 2023-12-12 DIAGNOSIS — R73.9 HYPERGLYCEMIA, UNSPECIFIED: ICD-10-CM

## 2023-12-13 ENCOUNTER — APPOINTMENT (OUTPATIENT)
Dept: INTERNAL MEDICINE | Facility: CLINIC | Age: 73
End: 2023-12-13
Payer: MEDICAID

## 2023-12-13 ENCOUNTER — OUTPATIENT (OUTPATIENT)
Dept: OUTPATIENT SERVICES | Facility: HOSPITAL | Age: 73
LOS: 1 days | End: 2023-12-13
Payer: MEDICAID

## 2023-12-13 VITALS
BODY MASS INDEX: 35.7 KG/M2 | SYSTOLIC BLOOD PRESSURE: 117 MMHG | HEART RATE: 68 BPM | HEIGHT: 62 IN | DIASTOLIC BLOOD PRESSURE: 69 MMHG | WEIGHT: 194 LBS | OXYGEN SATURATION: 100 % | TEMPERATURE: 98 F

## 2023-12-13 DIAGNOSIS — Z95.810 PRESENCE OF AUTOMATIC (IMPLANTABLE) CARDIAC DEFIBRILLATOR: Chronic | ICD-10-CM

## 2023-12-13 DIAGNOSIS — I50.22 CHRONIC SYSTOLIC (CONGESTIVE) HEART FAILURE: ICD-10-CM

## 2023-12-13 DIAGNOSIS — R73.03 PREDIABETES: ICD-10-CM

## 2023-12-13 DIAGNOSIS — E03.8 OTHER SPECIFIED HYPOTHYROIDISM: ICD-10-CM

## 2023-12-13 DIAGNOSIS — M79.10 MYALGIA, UNSPECIFIED SITE: ICD-10-CM

## 2023-12-13 DIAGNOSIS — I10 ESSENTIAL (PRIMARY) HYPERTENSION: ICD-10-CM

## 2023-12-13 DIAGNOSIS — E66.9 OBESITY, UNSPECIFIED: ICD-10-CM

## 2023-12-13 DIAGNOSIS — R79.89 OTHER SPECIFIED ABNORMAL FINDINGS OF BLOOD CHEMISTRY: ICD-10-CM

## 2023-12-13 DIAGNOSIS — Z95.1 PRESENCE OF AORTOCORONARY BYPASS GRAFT: Chronic | ICD-10-CM

## 2023-12-13 DIAGNOSIS — I25.10 ATHEROSCLEROTIC HEART DISEASE OF NATIVE CORONARY ARTERY WITHOUT ANGINA PECTORIS: ICD-10-CM

## 2023-12-13 DIAGNOSIS — Z00.00 ENCOUNTER FOR GENERAL ADULT MEDICAL EXAMINATION WITHOUT ABNORMAL FINDINGS: ICD-10-CM

## 2023-12-13 DIAGNOSIS — Z98.890 OTHER SPECIFIED POSTPROCEDURAL STATES: Chronic | ICD-10-CM

## 2023-12-13 PROCEDURE — 99214 OFFICE O/P EST MOD 30 MIN: CPT

## 2023-12-13 NOTE — HISTORY OF PRESENT ILLNESS
[FreeTextEntry1] : for f/u  [de-identified] : 73 year old female hx of HFrEF HTN HLD and pre-diabetes here for f/u eval- she cannot sleep at night, sleeos 30 minutes during day generalized muscle pain > 2 years, impairing sleeping she tried before taking meds but she does not want to take anything  that will effect her mentation.  Reports an episode of chest pain 2 weeks ago took nitro and felt better.

## 2023-12-13 NOTE — PLAN
[FreeTextEntry1] : #fibromyalgia vs statin mylagia vs RLS  -cpk normal  -hold statin 2 weeks and f/u with me, if no relief then treat with duloxetine and will resume statin if symptoms resolve, will change lipid agent  #normocytic anemia  -hemoglobin 11.2 -b12 and folate levels normal -had colonoscopy has hemorrhoids   #hypothyroidism, subclinical -tsh 5.56 -t4 normal  -not really symptomatic will continue to trend   #chronic systolic hf -euvolemic on exam  -follows with Dr. Bolton  -Cont ASA / Plavix  Cont Lipitor  Cont Entresto, Toprol, Spironolactone  Cont Chlorthalidone  Cont Imdur / Ranexa  #HLD -HOLDING statin 2 weeks, patient to f/u with me in 2 weeks  #PATRICIA/OHS -referral to pulm for sleep study     #preDM - Dietary modification -screening fasting lipid profile -recommended wt loss Hyperlipidemia; Low fat, low cholesterol diet. Discussed importance of eating a heart healthy diet Counseled on aerobic exercise and weight loss Fasting lipid panel every 3 months Treatment options and possible side effects discussed Smoking cessation discussed, if applicable Patient counseled on effects of ETOH on lipid levels -if a1c>7 will start SGLT2i   htn controlled HTN; DASH diet discussed and recommended Exercise and weight loss counseled Frequency and target at home BP readings discussed Decrease caffeine intake Treatment options and possible side effects discussed Patient counseled on symptoms of hypo/hypertension Counseled: Yearly Ophthalmology exams  Obesity; Diet/Exercise Counseled Patient was counseled on changing their diet to low fat, low carbohydrates and low cholesterol. Patient was also counseled on increasing their activity to 45 minutes of exercise daily.  hcm - mammogram: July 2023 discussed with pt - colonoscopy: done 2016, normal results - pap: > 5 years ago, deferred - Dexa July 2020: repeat in 2-3 years  Return: 1months labs prior to returning

## 2023-12-13 NOTE — INTERPRETER SERVICES
[Time Spent: ____ minutes] : Total time spent using  services: [unfilled] minutes. The patient's primary language is not English thus required  services. [Interpreters_IDNumber] : [id number] [Interpreters_FullName] : patsy [TWNoteComboBox1] : Yemeni

## 2023-12-13 NOTE — PHYSICAL EXAM
[No Acute Distress] : no acute distress [No Accessory Muscle Use] : no accessory muscle use [Clear to Auscultation] : lungs were clear to auscultation bilaterally [Regular Rhythm] : with a regular rhythm [Normal S1, S2] : normal S1 and S2 [Soft] : abdomen soft [Non Tender] : non-tender [Non-distended] : non-distended [No Joint Swelling] : no joint swelling [No Rash] : no rash [No Focal Deficits] : no focal deficits [Normal Insight/Judgement] : insight and judgment were intact [de-identified] : thick neck [de-identified] : overweight [de-identified] : denies depression or si/hi

## 2023-12-13 NOTE — REVIEW OF SYSTEMS
[Negative] : Heme/Lymph [Chest Pain] : chest pain [Shortness Of Breath] : shortness of breath [Constipation] : constipation [FreeTextEntry5] : chest pain with exertion reliveved by nitro  [FreeTextEntry6] : raygoza  [FreeTextEntry9] : diffuse muscle pain LE edema

## 2023-12-13 NOTE — END OF VISIT
[] : Resident [FreeTextEntry3] : I saw the patient, examined the patient independently, reviewed medical record, and provided the medical services. Agree with resident note as personally edited above. profound myaglias impairing sleep significantly. not depressed. trial off statin, f/u in 2 weeks. ideally should be on for heart failure, but will first try to exclude as a culprit. f/u in 2-4 weeks. rheumatic screen. may try empiric tx c duloxetine on f/u visit if no resolution from holding statin

## 2023-12-15 ENCOUNTER — APPOINTMENT (OUTPATIENT)
Dept: CARDIOLOGY | Facility: CLINIC | Age: 73
End: 2023-12-15
Payer: MEDICAID

## 2023-12-15 VITALS
HEART RATE: 55 BPM | BODY MASS INDEX: 35.7 KG/M2 | SYSTOLIC BLOOD PRESSURE: 122 MMHG | WEIGHT: 194 LBS | DIASTOLIC BLOOD PRESSURE: 72 MMHG | HEIGHT: 62 IN

## 2023-12-15 PROCEDURE — 93000 ELECTROCARDIOGRAM COMPLETE: CPT

## 2023-12-15 PROCEDURE — 99214 OFFICE O/P EST MOD 30 MIN: CPT

## 2023-12-15 NOTE — PHYSICAL EXAM
[de-identified] : well appearing, overweight, no distress [de-identified] : reg, nL s1/s2, no m/r/g [de-identified] : CTA [de-identified] : warm, no edema [de-identified] : alert, normal affect, logical conversation

## 2023-12-15 NOTE — DISCUSSION/SUMMARY
[FreeTextEntry1] : Cont ASA / Plavix Cont Entresto, Toprol, Spironolactone Cont Chlorthalidone Cont Imdur / Ranexa  Hold Lipitor x 2 weeks.  Notify office if pain resolves.  Will change statin.  Otherwise, follow-up PMD and neurology to evaluate pain.  Follow-up 6 months. [EKG obtained to assist in diagnosis and management of assessed problem(s)] : EKG obtained to assist in diagnosis and management of assessed problem(s)

## 2023-12-15 NOTE — ASSESSMENT
[FreeTextEntry1] : CAD s/p CABG Angina controlled. Poor revascularization targets.  ICM s/p AICD Severe LV Dysfunction (improved with revascularization and medication)  Chronic Systolic CHF Compensated.

## 2023-12-15 NOTE — REASON FOR VISIT
[FreeTextEntry1] : Uzbek  [id number]  Feels well.  No angina.  Stable exertional dyspnea.  Bilateral leg pain.  PMD stopped statin 2 days ago.  Selected labs reviewed (PMD, 12/2023): CBC, CMP, lipids, T4 unremarkable

## 2023-12-19 ENCOUNTER — NON-APPOINTMENT (OUTPATIENT)
Age: 73
End: 2023-12-19

## 2023-12-26 LAB
ALBUMIN SERPL ELPH-MCNC: 4.6 G/DL
ALP BLD-CCNC: 40 U/L
ALT SERPL-CCNC: 20 U/L
ANION GAP SERPL CALC-SCNC: 14 MMOL/L
AST SERPL-CCNC: 22 U/L
BASOPHILS # BLD AUTO: 0.03 K/UL
BASOPHILS NFR BLD AUTO: 0.6 %
BILIRUB SERPL-MCNC: 0.7 MG/DL
BUN SERPL-MCNC: 22 MG/DL
CALCIUM SERPL-MCNC: 9.8 MG/DL
CHLORIDE SERPL-SCNC: 96 MMOL/L
CHOLEST SERPL-MCNC: 175 MG/DL
CO2 SERPL-SCNC: 23 MMOL/L
CREAT SERPL-MCNC: 1 MG/DL
EGFR: 60 ML/MIN/1.73M2
EOSINOPHIL # BLD AUTO: 0.06 K/UL
EOSINOPHIL NFR BLD AUTO: 1.2 %
FOLATE SERPL-MCNC: 15.7 NG/ML
GLUCOSE SERPL-MCNC: 112 MG/DL
HCT VFR BLD CALC: 33 %
HDLC SERPL-MCNC: 92 MG/DL
HGB BLD-MCNC: 11.2 G/DL
IMM GRANULOCYTES NFR BLD AUTO: 0.2 %
LDLC SERPL CALC-MCNC: 67 MG/DL
LYMPHOCYTES # BLD AUTO: 2.25 K/UL
LYMPHOCYTES NFR BLD AUTO: 45.4 %
MAN DIFF?: NORMAL
MCHC RBC-ENTMCNC: 33.9 G/DL
MCHC RBC-ENTMCNC: 34 PG
MCV RBC AUTO: 100.3 FL
MONOCYTES # BLD AUTO: 0.67 K/UL
MONOCYTES NFR BLD AUTO: 13.5 %
NEUTROPHILS # BLD AUTO: 1.94 K/UL
NEUTROPHILS NFR BLD AUTO: 39.1 %
NONHDLC SERPL-MCNC: 83 MG/DL
PLATELET # BLD AUTO: 233 K/UL
POTASSIUM SERPL-SCNC: 4.2 MMOL/L
PROT SERPL-MCNC: 7.3 G/DL
RBC # BLD: 3.29 M/UL
RBC # FLD: 12.9 %
SODIUM SERPL-SCNC: 133 MMOL/L
TRIGL SERPL-MCNC: 80 MG/DL
TSH SERPL-ACNC: 5.56 UIU/ML
VIT B12 SERPL-MCNC: 508 PG/ML
WBC # FLD AUTO: 4.96 K/UL

## 2024-01-08 ENCOUNTER — OUTPATIENT (OUTPATIENT)
Dept: OUTPATIENT SERVICES | Facility: HOSPITAL | Age: 74
LOS: 1 days | End: 2024-01-08
Payer: MEDICAID

## 2024-01-08 DIAGNOSIS — Z98.890 OTHER SPECIFIED POSTPROCEDURAL STATES: Chronic | ICD-10-CM

## 2024-01-08 DIAGNOSIS — Z95.1 PRESENCE OF AORTOCORONARY BYPASS GRAFT: Chronic | ICD-10-CM

## 2024-01-08 DIAGNOSIS — Z95.810 PRESENCE OF AUTOMATIC (IMPLANTABLE) CARDIAC DEFIBRILLATOR: Chronic | ICD-10-CM

## 2024-01-08 DIAGNOSIS — R73.03 PREDIABETES: ICD-10-CM

## 2024-01-08 DIAGNOSIS — M79.10 MYALGIA, UNSPECIFIED SITE: ICD-10-CM

## 2024-01-08 PROCEDURE — 83550 IRON BINDING TEST: CPT

## 2024-01-08 PROCEDURE — 82085 ASSAY OF ALDOLASE: CPT

## 2024-01-08 PROCEDURE — 86431 RHEUMATOID FACTOR QUANT: CPT

## 2024-01-08 PROCEDURE — 86038 ANTINUCLEAR ANTIBODIES: CPT

## 2024-01-08 PROCEDURE — 85027 COMPLETE CBC AUTOMATED: CPT

## 2024-01-08 PROCEDURE — 84443 ASSAY THYROID STIM HORMONE: CPT

## 2024-01-08 PROCEDURE — 80053 COMPREHEN METABOLIC PANEL: CPT

## 2024-01-08 PROCEDURE — 85652 RBC SED RATE AUTOMATED: CPT

## 2024-01-08 PROCEDURE — 83540 ASSAY OF IRON: CPT

## 2024-01-08 PROCEDURE — 83036 HEMOGLOBIN GLYCOSYLATED A1C: CPT

## 2024-01-09 DIAGNOSIS — R73.03 PREDIABETES: ICD-10-CM

## 2024-01-09 DIAGNOSIS — M79.10 MYALGIA, UNSPECIFIED SITE: ICD-10-CM

## 2024-01-17 ENCOUNTER — APPOINTMENT (OUTPATIENT)
Dept: INTERNAL MEDICINE | Facility: CLINIC | Age: 74
End: 2024-01-17

## 2024-01-22 ENCOUNTER — NON-APPOINTMENT (OUTPATIENT)
Age: 74
End: 2024-01-22

## 2024-01-22 ENCOUNTER — APPOINTMENT (OUTPATIENT)
Dept: CARDIOLOGY | Facility: CLINIC | Age: 74
End: 2024-01-22
Payer: MEDICAID

## 2024-01-22 PROCEDURE — 93296 REM INTERROG EVL PM/IDS: CPT

## 2024-01-22 PROCEDURE — 93295 DEV INTERROG REMOTE 1/2/MLT: CPT

## 2024-01-29 LAB
ALBUMIN SERPL ELPH-MCNC: 4.6 G/DL
ALDOLASE SERPL-CCNC: 3 U/L
ALP BLD-CCNC: 53 U/L
ALT SERPL-CCNC: 19 U/L
ANACR T: NEGATIVE
ANION GAP SERPL CALC-SCNC: 15 MMOL/L
AST SERPL-CCNC: 21 U/L
BILIRUB SERPL-MCNC: 0.5 MG/DL
BUN SERPL-MCNC: 21 MG/DL
CALCIUM SERPL-MCNC: 10.1 MG/DL
CHLORIDE SERPL-SCNC: 92 MMOL/L
CO2 SERPL-SCNC: 23 MMOL/L
CREAT SERPL-MCNC: 1 MG/DL
EGFR: 60 ML/MIN/1.73M2
ERYTHROCYTE [SEDIMENTATION RATE] IN BLOOD BY WESTERGREN METHOD: 11 MM/HR
ESTIMATED AVERAGE GLUCOSE: 126 MG/DL
GLUCOSE SERPL-MCNC: 112 MG/DL
HBA1C MFR BLD HPLC: 6 %
HCT VFR BLD CALC: 32.6 %
HGB BLD-MCNC: 11.3 G/DL
IRON SATN MFR SERPL: 37 %
IRON SERPL-MCNC: 121 UG/DL
MCHC RBC-ENTMCNC: 34.7 G/DL
MCHC RBC-ENTMCNC: 34.9 PG
MCV RBC AUTO: 100.6 FL
PLATELET # BLD AUTO: 271 K/UL
PMV BLD: 9.9 FL
POTASSIUM SERPL-SCNC: 4.7 MMOL/L
PROT SERPL-MCNC: 7.1 G/DL
RBC # BLD: 3.24 M/UL
RBC # FLD: 12.6 %
RHEUMATOID FACT SER QL: 19 IU/ML
SODIUM SERPL-SCNC: 130 MMOL/L
TIBC SERPL-MCNC: 324 UG/DL
TSH SERPL-ACNC: 3.29 UIU/ML
UIBC SERPL-MCNC: 203 UG/DL
WBC # FLD AUTO: 4.97 K/UL

## 2024-02-20 ENCOUNTER — APPOINTMENT (OUTPATIENT)
Dept: NEUROLOGY | Facility: CLINIC | Age: 74
End: 2024-02-20

## 2024-03-12 ENCOUNTER — APPOINTMENT (OUTPATIENT)
Dept: INTERNAL MEDICINE | Facility: CLINIC | Age: 74
End: 2024-03-12
Payer: MEDICAID

## 2024-03-12 ENCOUNTER — OUTPATIENT (OUTPATIENT)
Dept: OUTPATIENT SERVICES | Facility: HOSPITAL | Age: 74
LOS: 1 days | End: 2024-03-12
Payer: MEDICAID

## 2024-03-12 VITALS
HEART RATE: 67 BPM | SYSTOLIC BLOOD PRESSURE: 104 MMHG | DIASTOLIC BLOOD PRESSURE: 65 MMHG | WEIGHT: 188 LBS | BODY MASS INDEX: 34.6 KG/M2 | OXYGEN SATURATION: 97 % | HEIGHT: 62 IN

## 2024-03-12 DIAGNOSIS — M79.2 NEURALGIA AND NEURITIS, UNSPECIFIED: ICD-10-CM

## 2024-03-12 DIAGNOSIS — R73.03 PREDIABETES.: ICD-10-CM

## 2024-03-12 DIAGNOSIS — G25.81 RESTLESS LEGS SYNDROME: ICD-10-CM

## 2024-03-12 DIAGNOSIS — Z98.890 OTHER SPECIFIED POSTPROCEDURAL STATES: Chronic | ICD-10-CM

## 2024-03-12 DIAGNOSIS — E66.9 OBESITY, UNSPECIFIED: ICD-10-CM

## 2024-03-12 DIAGNOSIS — K21.9 GASTRO-ESOPHAGEAL REFLUX DISEASE W/OUT ESOPHAGITIS: ICD-10-CM

## 2024-03-12 DIAGNOSIS — Z95.1 PRESENCE OF AORTOCORONARY BYPASS GRAFT: Chronic | ICD-10-CM

## 2024-03-12 DIAGNOSIS — R79.89 OTHER SPECIFIED ABNORMAL FINDINGS OF BLOOD CHEMISTRY: ICD-10-CM

## 2024-03-12 DIAGNOSIS — E78.5 HYPERLIPIDEMIA, UNSPECIFIED: ICD-10-CM

## 2024-03-12 DIAGNOSIS — Z00.00 ENCOUNTER FOR GENERAL ADULT MEDICAL EXAMINATION WITHOUT ABNORMAL FINDINGS: ICD-10-CM

## 2024-03-12 PROCEDURE — 99214 OFFICE O/P EST MOD 30 MIN: CPT

## 2024-03-12 PROCEDURE — G2211 COMPLEX E/M VISIT ADD ON: CPT | Mod: NC,1L

## 2024-03-12 PROCEDURE — T1013: CPT

## 2024-03-12 PROCEDURE — 99214 OFFICE O/P EST MOD 30 MIN: CPT | Mod: 25

## 2024-03-12 RX ORDER — ATORVASTATIN CALCIUM 40 MG/1
40 TABLET, FILM COATED ORAL
Qty: 90 | Refills: 2 | Status: ACTIVE | COMMUNITY
Start: 2019-11-25 | End: 1900-01-01

## 2024-03-12 RX ORDER — SACUBITRIL AND VALSARTAN 24; 26 MG/1; MG/1
24-26 TABLET, FILM COATED ORAL
Qty: 180 | Refills: 1 | Status: ACTIVE | COMMUNITY
Start: 2020-01-27 | End: 1900-01-01

## 2024-03-12 RX ORDER — SPIRONOLACTONE 25 MG/1
25 TABLET ORAL
Qty: 45 | Refills: 3 | Status: ACTIVE | COMMUNITY
Start: 2020-01-27 | End: 1900-01-01

## 2024-03-12 RX ORDER — METOPROLOL SUCCINATE 100 MG/1
100 TABLET, EXTENDED RELEASE ORAL DAILY
Qty: 90 | Refills: 1 | Status: ACTIVE | COMMUNITY
Start: 2017-03-20 | End: 1900-01-01

## 2024-03-12 RX ORDER — FAMOTIDINE 20 MG/1
20 TABLET, FILM COATED ORAL
Qty: 90 | Refills: 1 | Status: ACTIVE | COMMUNITY
Start: 2023-05-30 | End: 1900-01-01

## 2024-03-12 RX ORDER — ISOSORBIDE MONONITRATE 30 MG/1
30 TABLET, EXTENDED RELEASE ORAL
Qty: 90 | Refills: 5 | Status: ACTIVE | COMMUNITY
Start: 2019-11-25 | End: 1900-01-01

## 2024-03-12 RX ORDER — RANOLAZINE 1000 MG/1
1000 TABLET, EXTENDED RELEASE ORAL
Qty: 90 | Refills: 3 | Status: ACTIVE | COMMUNITY
Start: 2018-05-30 | End: 1900-01-01

## 2024-03-12 RX ORDER — MAGNESIUM OXIDE 241.3 MG/1000MG
400 TABLET ORAL DAILY
Qty: 10 | Refills: 0 | Status: COMPLETED | COMMUNITY
Start: 2023-07-25 | End: 2024-03-12

## 2024-03-12 RX ORDER — DIAPER,BRIEF,INFANT-TODD,DISP
600-10 EACH MISCELLANEOUS
Qty: 90 | Refills: 3 | Status: ACTIVE | COMMUNITY
Start: 2023-03-20 | End: 1900-01-01

## 2024-03-12 NOTE — END OF VISIT
[FreeTextEntry3] : I saw the patient, examined the patient independently, reviewed medical record, and provided the medical services. Agree with resident note as personally edited above. chronic comorbidities well controlled,  main issue today is restless legs trial plan as above counselled regarding red flags c meds in light of significant cardiac hx iron stores okay [] : Resident

## 2024-03-12 NOTE — PHYSICAL EXAM
[No Acute Distress] : no acute distress [No Accessory Muscle Use] : no accessory muscle use [Regular Rhythm] : with a regular rhythm [Clear to Auscultation] : lungs were clear to auscultation bilaterally [Normal S1, S2] : normal S1 and S2 [Non Tender] : non-tender [Soft] : abdomen soft [Non-distended] : non-distended [No Joint Swelling] : no joint swelling [No Rash] : no rash [Normal Insight/Judgement] : insight and judgment were intact [No Focal Deficits] : no focal deficits [de-identified] : thick neck [de-identified] : overweight [de-identified] : denies depression or si/hi

## 2024-03-12 NOTE — INTERPRETER SERVICES
[Time Spent: ____ minutes] : Total time spent using  services: [unfilled] minutes. The patient's primary language is not English thus required  services. [[employer] ] : provided by [employer]   [Interpreters_FullName] : Heidy [Interpreters_IDNumber] : [id number] [TWNoteComboBox1] : Zack

## 2024-03-12 NOTE — REVIEW OF SYSTEMS
[Constipation] : constipation [Shortness Of Breath] : shortness of breath [Chest Pain] : chest pain [Negative] : Psychiatric [FreeTextEntry9] : diffuse muscle pain LE edema  [FreeTextEntry6] : raygoza  [FreeTextEntry5] : chest pain with exertion reliveved by nitro

## 2024-03-12 NOTE — HISTORY OF PRESENT ILLNESS
[FreeTextEntry1] : for f/u  [de-identified] : 73 year old female hx of HFrEF HTN HLD and pre-diabetes here for f/u eval-  Primary concern is BL leg pain and restless legs at night

## 2024-03-12 NOTE — PLAN
[FreeTextEntry1] : # restless leg syndrome  - cpk normal  - held statin for 10 days but no improvement - aldolase low; RF 19 - will trial tonic water before bed if improvement, continue counselled patient on how/when to take medication and about side effects if fails - will trial ropinirole 0.25 qhs - pt edu to try tonic water, if no improvement will try ropinirole, if still no improvement will send to neuro- movement specialist   #normocytic anemia  -hemoglobin 11.2 --> 11.3 -b12 and folate levels normal -had colonoscopy has hemorrhoids   #hypothyroidism resolved -tsh 5.56 -->3.29 -t4 normal  -not symptomatic will continue to trend   #chronic systolic hf - stable/ compensated -euvolemic on exam  -follows with Dr. Bolton  -Cont ASA / Plavix Cont Lipitor Cont Entresto, Toprol, Spironolactone Cont Chlorthalidone Cont Imdur / Ranexa  #PATRICIA/OHS -referral to pulm for sleep study, not done yet  #preDM - Dietary modification - screening fasting lipid profile - recommended wt loss Hyperlipidemia; Low fat, low cholesterol diet. Discussed importance of eating a heart healthy diet Counseled on aerobic exercise and weight loss Fasting lipid panel every 3 months Treatment options and possible side effects discussed Smoking cessation discussed, if applicable Patient counseled on effects of ETOH on lipid levels -if a1c>7 will start SGLT2i  htn controlled HTN; DASH diet discussed and recommended Exercise and weight loss counseled Frequency and target at home BP readings discussed Decrease caffeine intake Treatment options and possible side effects discussed Patient counseled on symptoms of hypo/hypertension Counseled: Yearly Ophthalmology exams  Obesity; Diet/Exercise Counseled Patient was counseled on changing their diet to low fat, low carbohydrates and low cholesterol. Patient was also counseled on increasing their activity to 45 minutes of exercise daily.  hcm - mammogram: July 2023 discussed with pt Mammogram Cancer Screening; Patient counseled on the importance of a yearly mammogram screening and directed to have test performed or follow-up with OB/GYN. Failure to perform test can result in breast cancer and death - colonoscopy: done 2016, normal results - pap: > 5 years ago, deferred - Dexa July 2020: repeat in 2-3 years  seen by Resident Sharifa moore 3 mos

## 2024-03-18 ENCOUNTER — EMERGENCY (EMERGENCY)
Facility: HOSPITAL | Age: 74
LOS: 0 days | Discharge: HOME | End: 2024-03-18
Attending: EMERGENCY MEDICINE
Payer: MEDICARE

## 2024-03-18 VITALS
OXYGEN SATURATION: 99 % | TEMPERATURE: 98 F | DIASTOLIC BLOOD PRESSURE: 69 MMHG | RESPIRATION RATE: 16 BRPM | WEIGHT: 190.04 LBS | SYSTOLIC BLOOD PRESSURE: 148 MMHG | HEIGHT: 62.2 IN | HEART RATE: 58 BPM

## 2024-03-18 DIAGNOSIS — M54.16 RADICULOPATHY, LUMBAR REGION: ICD-10-CM

## 2024-03-18 DIAGNOSIS — Z95.810 PRESENCE OF AUTOMATIC (IMPLANTABLE) CARDIAC DEFIBRILLATOR: ICD-10-CM

## 2024-03-18 DIAGNOSIS — Z98.890 OTHER SPECIFIED POSTPROCEDURAL STATES: Chronic | ICD-10-CM

## 2024-03-18 DIAGNOSIS — Z95.1 PRESENCE OF AORTOCORONARY BYPASS GRAFT: ICD-10-CM

## 2024-03-18 DIAGNOSIS — Z95.810 PRESENCE OF AUTOMATIC (IMPLANTABLE) CARDIAC DEFIBRILLATOR: Chronic | ICD-10-CM

## 2024-03-18 DIAGNOSIS — Z95.1 PRESENCE OF AORTOCORONARY BYPASS GRAFT: Chronic | ICD-10-CM

## 2024-03-18 DIAGNOSIS — I10 ESSENTIAL (PRIMARY) HYPERTENSION: ICD-10-CM

## 2024-03-18 DIAGNOSIS — M25.552 PAIN IN LEFT HIP: ICD-10-CM

## 2024-03-18 PROCEDURE — 99283 EMERGENCY DEPT VISIT LOW MDM: CPT | Mod: 25

## 2024-03-18 PROCEDURE — 73502 X-RAY EXAM HIP UNI 2-3 VIEWS: CPT | Mod: 26,LT

## 2024-03-18 PROCEDURE — 96372 THER/PROPH/DIAG INJ SC/IM: CPT

## 2024-03-18 PROCEDURE — 73502 X-RAY EXAM HIP UNI 2-3 VIEWS: CPT | Mod: LT

## 2024-03-18 PROCEDURE — 99284 EMERGENCY DEPT VISIT MOD MDM: CPT

## 2024-03-18 RX ORDER — METHOCARBAMOL 500 MG/1
750 TABLET, FILM COATED ORAL ONCE
Refills: 0 | Status: COMPLETED | OUTPATIENT
Start: 2024-03-18 | End: 2024-03-18

## 2024-03-18 RX ORDER — TIZANIDINE 4 MG/1
1 TABLET ORAL
Qty: 12 | Refills: 0
Start: 2024-03-18 | End: 2024-03-21

## 2024-03-18 RX ORDER — KETOROLAC TROMETHAMINE 30 MG/ML
15 SYRINGE (ML) INJECTION ONCE
Refills: 0 | Status: DISCONTINUED | OUTPATIENT
Start: 2024-03-18 | End: 2024-03-18

## 2024-03-18 RX ADMIN — METHOCARBAMOL 750 MILLIGRAM(S): 500 TABLET, FILM COATED ORAL at 10:46

## 2024-03-18 RX ADMIN — Medication 15 MILLIGRAM(S): at 10:46

## 2024-03-18 NOTE — ED PROVIDER NOTE - WR ORDER NAME 1
Xray Hip 2-3 Views, Left
PAST MEDICAL HISTORY:  Current smoker     History of depression and anxiety    HIV infection 6/30/2022 virus detected, switched to Biktarvy, male partners    Rectal cancer not on tretment    Right cataract

## 2024-03-18 NOTE — ED PROVIDER NOTE - PHYSICAL EXAMINATION
Physical Exam    Constitutional: No acute distress.   Eyes: Conjunctiva pink, Sclera clear, PERRLA, EOMI.  ENT: No sinus tenderness. No nasal discharge. No oropharyngeal erythema, edema, or exudates. Uvula midline.   Cardiovascular: Regular rate, regular rhythm. No noted murmurs rubs or gallops.  Respiratory: unlabored respiratory effort, clear to auscultation bilaterally no wheezing, rales or rhonchi  Gastrointestinal: Normal bowel sounds. soft, non distended, non-tender abdomen.   Musculoskeletal: supple neck, no midline tenderness. No lower back tenderness. Stable pelvis. Full ROM right hip. Right lower extremity non tender to palpation. No overlying ecchymosis or edema  Integumentary: warm, dry, no rash  Neurologic: awake, alert, cranial nerves II-XII grossly intact, extremities’ motor and sensory functions grossly intact

## 2024-03-18 NOTE — ED PROVIDER NOTE - CARE PROVIDERS DIRECT ADDRESSES
[email or web address]

## 2024-03-18 NOTE — ED PROVIDER NOTE - PATIENT PORTAL LINK FT
You can access the FollowMyHealth Patient Portal offered by [facility] by registering at the following website: [email or web address]. By joining [facility]’s FollowMyHealth portal, you will also be able to view your health information using other applications (apps) compatible with our system.

## 2024-03-18 NOTE — ED PROVIDER NOTE - OBJECTIVE STATEMENT
73-year-old female with a history of 5 vessel CABG, AICD, HTN presents to the ED with leg pain.  Patient has had pain in her bilateral lower extremities for the past few weeks and was prescribed ropinirole at bedtime by her primary care provider Dr. Griggs which was helping.  3 days ago she developed more severe pain in her left hip radiating down to her knee.  She is able to walk, stand and range her hip.  She is taking nothing else for pain other than Tylenol.  Denies associated skin rash, fall, redness, bruising, urinary incontinence, paresthesias, weakness, leg swelling.

## 2024-03-18 NOTE — ED ADULT TRIAGE NOTE - INTERPRETATION SERVICES DECLINED
Patient/Caregiver requests family/friend to interpret. [FreeTextEntry1] : CPE\par Obesity\par S/P gastric bypass\par Hypertension [de-identified] : She overall is feeling well. She sees gynecologist and  has an IUD. She has no menses. She wears  reading glasses. She is seeing the dentist and dentures made. She's planning to go back to school for an RN. 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 She states she tries to watch her diet and  tries to walk for exercise but has not been able to lose weight. She is interested in perhaps having revision of gastric bypass. She has no nausea vomiting diarrhea. Her bronchospasm has been controlled. She has no chest pain

## 2024-03-18 NOTE — ED PROVIDER NOTE - CARE PROVIDER_API CALL
Sushil Casarez  Pain Medicine  [address] [place] [address]  Phone: [phone]  Fax: [phone]  Follow Up Time:     Sakina Matta  Neurosurgery  [address] [place] [address], [address]  [place] [address]  Phone: [phone]  Fax: [phone]  Follow Up Time:

## 2024-03-18 NOTE — ED PROVIDER NOTE - CLINICAL SUMMARY MEDICAL DECISION MAKING FREE TEXT BOX
Pt's son translated for patient.  Patient's son is at bedside.  73-year-old female with bilateral chronic leg pain and what sounds and appears to be restless leg syndrome based on patient's son.  Patient was started on medication for the restless leg symptoms/leg pain that has helped with the symptoms.  Patient has had the symptoms for years.  However for the past few weeks patient with worsening left-sided lower back pain, left hip pain, and pain that goes down below the knee.  No urinary complaints.  No urinary incontinence.  No numbness no weakness.  No trauma.  No other complaints.  Patient is awake alert.  No midline vertebral tenderness.  No significant bony tenderness.  Flexion extension intact in the hip.  Motor sensation intact in left lower extremity.  No calf tenderness to left leg.  No calf swelling to left leg.  Dorsiflexion intact in left foot.  Plan: X-rays, supportive care, reassess.  I spoke to son about the importance of follow-up with pain management need for MRI as an outpatient.  Patient's son understood.  KAMILLA Laurent informed patient's son of x-ray finding.  Patient discharged with outpatient follow-up with neurosurgery and pain management.

## 2024-03-18 NOTE — ED ADULT NURSE NOTE - SUICIDE SCREENING QUESTION 1
Patient in for wound care treatment today, both legs with increased redness, and purulent drainage.   No

## 2024-03-18 NOTE — ED ADULT TRIAGE NOTE - LANGUAGE ASSISTANCE NEEDED
I am an RN.   Is this medication being given as a supportive medication related to a Treatment Plan (eg. Xgeva, Faslodex, ect.) or any side effects related to the Treatment Plan (eg. Neulasta, Zarxio, ect.)? No.  Vidaza  Pre-Injection Information: Allergies reviewed as required for injection type., Pt states feeling well, no complaints. and Pt denies signs and symptoms of infection.    Refer to MAR (medication administration record) for type of injection and medication given.  Needle Size: 25 g. 1 1/2\"  Patient tolerated well: Stable and Follow up appointment scheduled     Dr. Hanna is supervising clinician today.     No-Patient/Caregiver offered and refused free interpretation services.

## 2024-03-18 NOTE — ED PROVIDER NOTE - ATTENDING APP SHARED VISIT CONTRIBUTION OF CARE
History obtained from patient's son.  Patient's son is at bedside.  73-year-old female with bilateral chronic leg pain and what sounds and appears to be restless leg syndrome based on patient's son.  Patient was started on medication for the restless leg symptoms/leg pain that has helped with the symptoms.  Patient has had the symptoms for years.  However for the past few weeks patient with worsening left-sided lower back pain, left hip pain, and pain that goes down below the knee.  No urinary complaints.  No urinary incontinence.  No numbness no weakness.  No trauma.  No other complaints.  Patient is awake alert.  No midline vertebral tenderness.  No significant bony tenderness.  Flexion extension intact in the hip.  Motor sensation intact in left lower extremity.  No calf tenderness to left leg.  No calf swelling to left leg.  Dorsiflexion intact in left foot.  Plan: X-rays, supportive care, reassess.  I spoke to son about the importance of follow-up with pain management need for MRI as an outpatient.  Patient's son understood.    . Pt's son translated for patient.  Patient's son is at bedside.  73-year-old female with bilateral chronic leg pain and what sounds and appears to be restless leg syndrome based on patient's son.  Patient was started on medication for the restless leg symptoms/leg pain that has helped with the symptoms.  Patient has had the symptoms for years.  However for the past few weeks patient with worsening left-sided lower back pain, left hip pain, and pain that goes down below the knee.  No urinary complaints.  No urinary incontinence.  No numbness no weakness.  No trauma.  No other complaints.  Patient is awake alert.  No midline vertebral tenderness.  No significant bony tenderness.  Flexion extension intact in the hip.  Motor sensation intact in left lower extremity.  No calf tenderness to left leg.  No calf swelling to left leg.  Dorsiflexion intact in left foot.  Plan: X-rays, supportive care, reassess.  I spoke to son about the importance of follow-up with pain management need for MRI as an outpatient.  Patient's son understood.    .

## 2024-03-18 NOTE — ED PROVIDER NOTE - NSFOLLOWUPINSTRUCTIONS_ED_ALL_ED_FT
Please follow up with pain management and neurosurgery. Trial Tizanidine 4mg every 8 hours as needed for muscle spasms and stiffness.       Our Emergency Department Referral Coordinators will be reaching out to you in the next 24-48 hours from 9:00am to 5:00pm with a follow up appointment. Please expect a phone call from the hospital in that time frame. If you do not receive a call or if you have any questions or concerns, you can reach them at [phone]       Sciatica    WHAT YOU NEED TO KNOW:    Sciatica is a condition that causes pain along your sciatic nerve. The sciatic nerve runs from your spine through both sides of your buttocks. It then runs down the back of your thigh, into your lower leg and foot. Your sciatic nerve may be compressed, inflamed, irritated, or stretched.          DISCHARGE INSTRUCTIONS:    Medicines:     NSAIDs: These medicines decrease swelling and pain. NSAIDs are available without a doctor's order. Ask your healthcare provider which medicine is right for you. Ask how much to take and when to take it. Take as directed. NSAIDs can cause stomach bleeding or kidney problems if not taken correctly.      Acetaminophen: This medicine decreases pain. Acetaminophen is available without a doctor's order. Ask how much to take and when to take it. Follow directions. Acetaminophen can cause liver damage if not taken correctly.      Muscle relaxers help decrease pain and muscle spasms.      Take your medicine as directed. Contact your healthcare provider if you think your medicine is not helping or if you have side effects. Tell him of her if you are allergic to any medicine. Keep a list of the medicines, vitamins, and herbs you take. Include the amounts, and when and why you take them. Bring the list or the pill bottles to follow-up visits. Carry your medicine list with you in case of an emergency.    Follow up with your healthcare provider as directed: Write down your questions so you remember to ask them during your visits.     Manage your symptoms:     Activity: Decrease your activity. Do not lift heavy objects or twist your back for at least 6 weeks. Slowly return to your usual activity.      Ice: Ice helps decrease swelling and pain. Ice may also help prevent tissue damage. Use an ice pack, or put crushed ice in a plastic bag. Cover it with a towel and place it on your low back or leg for 15 to 20 minutes every hour or as directed.      Heat: Heat helps decrease pain and muscle spasms. Apply heat on the area for 20 to 30 minutes every 2 hours for as many days as directed.       Physical therapy: You may need to see physical therapist to teach you exercises to help improve movement and strength, and to decrease pain. An occupational therapist teaches you skills to help with your daily activities.       Use assistive devices if directed: You may need to wear back support, such as a back brace. You may need crutches, a cane, or a walker to decrease stress on your lower back and leg muscles. Ask your healthcare provider for more information about assistive devices and how to use them correctly.    Self-care:     Avoid pressure on your back and legs: Do not lift heavy objects, or stand or sit for long periods of time.      Lift objects safely: Keep your back straight and bend your knees when you  an object. Do not bend or twist your back when you lift.      Maintain a healthy weight: Ask your healthcare provider how much you should weigh. Ask him to help you create a weight loss plan if you are overweight.       Exercise: Ask your healthcare provider about the best stretching, warmup, and exercise plan for you.     Contact your healthcare provider if:     You have pain in your lower back at night or when resting.      You have pain in your lower back with numbness below the knee.      You have weakness in one leg only.      You have questions or concerns about your condition or care.    Return to the emergency department if:     You have trouble holding back your urine or bowel movements.      You have weakness in both legs.      You have numbness in your groin or buttocks.         © Copyright [employer] 2019 All illustrations and images included in CareNotes are the copyrighted property of [employer] Inc. or [employer]ley Clarke.

## 2024-03-21 NOTE — CHART NOTE - NSCHARTNOTEFT_GEN_A_CORE
[facility] MRN [id number] / Left message via  3/20 & 3/21 - MIREYA    Specialty: pain management & neurosurgery

## 2024-03-22 DIAGNOSIS — I10 ESSENTIAL (PRIMARY) HYPERTENSION: ICD-10-CM

## 2024-03-22 DIAGNOSIS — G25.81 RESTLESS LEGS SYNDROME: ICD-10-CM

## 2024-03-22 DIAGNOSIS — R73.03 PREDIABETES: ICD-10-CM

## 2024-03-22 DIAGNOSIS — M79.2 NEURALGIA AND NEURITIS, UNSPECIFIED: ICD-10-CM

## 2024-03-22 DIAGNOSIS — K21.9 GASTRO-ESOPHAGEAL REFLUX DISEASE WITHOUT ESOPHAGITIS: ICD-10-CM

## 2024-03-22 DIAGNOSIS — E78.5 HYPERLIPIDEMIA, UNSPECIFIED: ICD-10-CM

## 2024-03-22 DIAGNOSIS — E66.9 OBESITY, UNSPECIFIED: ICD-10-CM

## 2024-03-22 DIAGNOSIS — I25.10 ATHEROSCLEROTIC HEART DISEASE OF NATIVE CORONARY ARTERY WITHOUT ANGINA PECTORIS: ICD-10-CM

## 2024-03-22 DIAGNOSIS — R79.89 OTHER SPECIFIED ABNORMAL FINDINGS OF BLOOD CHEMISTRY: ICD-10-CM

## 2024-03-22 DIAGNOSIS — I50.22 CHRONIC SYSTOLIC (CONGESTIVE) HEART FAILURE: ICD-10-CM

## 2024-03-27 ENCOUNTER — APPOINTMENT (OUTPATIENT)
Dept: NEUROSURGERY | Facility: CLINIC | Age: 74
End: 2024-03-27
Payer: MEDICARE

## 2024-03-27 VITALS — WEIGHT: 188 LBS | BODY MASS INDEX: 34.6 KG/M2 | HEIGHT: 62 IN

## 2024-03-27 DIAGNOSIS — M47.816 SPONDYLOSIS W/OUT MYELOPATHY OR RADICULOPATHY, LUMBAR REGION: ICD-10-CM

## 2024-03-27 DIAGNOSIS — M25.552 PAIN IN LEFT HIP: ICD-10-CM

## 2024-03-27 PROCEDURE — 99204 OFFICE O/P NEW MOD 45 MIN: CPT

## 2024-03-27 NOTE — HISTORY OF PRESENT ILLNESS
[de-identified] : Ms. RUFFIN developed acute left lateral hip and groin pain a few weeks ago.  Given the severity of the pain she presented to the emergency room.  Hip x-rays were performed.  She was found to have mild bilateral hip osteoarthritis.  She was then referred to follow-up outpatient for further evaluation.  She has completed 5 sessions of home physical therapy.  She reports pain improvements with therapy exercises and stretching.  Today, she reports mild left-sided back pain with isolated left lateral hip and groin pain.  She feels some referred pain into the left quadricep.  She does not have imaging for the lumbar spine.  This will be ordered today.  Note she has a cardiac defibrillator and cannot undergo MRIs.  PHYSICAL EXAM:  Constitutional: Well appearing, no distress HEENT: Normocephalic Atraumatic Psychiatric: Alert and oriented to all spheres, normal mood Pulmonary: No respiratory distress Neurologic:  CN II-XII grossly intact Palpation: + Tenderness left lateral hip. Strength: Full strength in all major muscle groups pain inhibition in the left quadricep.,  Sensation: Full sensation to light touch in all extremities Reflexes:   2+ patellar  2+ ankle jerk Restriction range of motion of the lower back and bilateral hips. Signs: SLR negative Gait: steady, walking w/o assistance.

## 2024-03-27 NOTE — ASSESSMENT
[FreeTextEntry1] : Ms. RUFFIN will proceed with x-rays of the lumbar spine AP lateral flexion-extension views.  She would also like to start outpatient physical therapy.  5 sessions of home therapy was really helping her and she is hoping outpatient therapy will provide further improvement.  I will see her back in 6 to 8 weeks for reassessment.  Any changes or concerns she will notify me.  Since she has a cardiac defibrillator cannot undergo MRIs if her symptoms continue then a myelogram will be ordered.  The patient and her family member are agreeable with the plan of care.  All questions answered today.  MS JORDY DislaC Senior Physician Assistant [facility]    Sakina Matta MD FAANS Chair, Department of Neurosurgery [facility]

## 2024-04-18 ENCOUNTER — APPOINTMENT (OUTPATIENT)
Dept: ELECTROPHYSIOLOGY | Facility: CLINIC | Age: 74
End: 2024-04-18
Payer: MEDICAID

## 2024-04-18 VITALS
HEIGHT: 62 IN | WEIGHT: 189 LBS | SYSTOLIC BLOOD PRESSURE: 100 MMHG | DIASTOLIC BLOOD PRESSURE: 70 MMHG | BODY MASS INDEX: 34.78 KG/M2

## 2024-04-18 PROCEDURE — 93282 PRGRMG EVAL IMPLANTABLE DFB: CPT

## 2024-04-18 PROCEDURE — 93290 INTERROG DEV EVAL ICPMS IP: CPT

## 2024-04-18 NOTE — HISTORY OF PRESENT ILLNESS
[None] : The patient complains of no symptoms [Palpitations] : no palpitations [SOB] : no dyspnea [Syncope] : no syncope [Dizziness] : no dizziness [Chest Pain] : no chest pain or discomfort [Erythema at Site] : no erythema at device site [Swelling at Site] : no swelling at device site [de-identified] : Patient with history of CAD, CABG, LV dysfunction, inducible VT s/p ICD implant. Patient comes for follow up.    Patient comes to my office for routine follow-up. Patient is doing well.

## 2024-04-18 NOTE — HISTORY OF PRESENT ILLNESS
[None] : The patient complains of no symptoms [Palpitations] : no palpitations [SOB] : no dyspnea [Syncope] : no syncope [Dizziness] : no dizziness [Chest Pain] : no chest pain or discomfort [Erythema at Site] : no erythema at device site [Swelling at Site] : no swelling at device site [de-identified] : Patient with history of CAD, CABG, LV dysfunction, inducible VT s/p ICD implant. Patient comes for follow up.    Patient comes to my office for routine follow-up. Patient is doing well.

## 2024-04-18 NOTE — ASSESSMENT
[FreeTextEntry1] : CHF  - Normal functioning single-chamber ICD.  - Euvolemic  - Continue Entresto 24-26 Q12.  - Continue Metoprolol 100 mg once a day.      HTN  - Controlled.  - Continue Metoprolol 100 mg QD.  - Continue Spironolactone 25 mg once a day.

## 2024-04-18 NOTE — PHYSICAL EXAM
[General Appearance - Well Developed] : well developed [Normal Appearance] : normal appearance [Well Groomed] : well groomed [General Appearance - Well Nourished] : well nourished [No Deformities] : no deformities [General Appearance - In No Acute Distress] : no acute distress [Heart Rate And Rhythm] : heart rate and rhythm were normal [Heart Sounds] : normal S1 and S2 [Murmurs] : no murmurs present [Respiration, Rhythm And Depth] : normal respiratory rhythm and effort [Auscultation Breath Sounds / Voice Sounds] : lungs were clear to auscultation bilaterally [Exaggerated Use Of Accessory Muscles For Inspiration] : no accessory muscle use [Clean] : clean [Dry] : dry [Well-Healed] : well-healed [Abdomen Soft] : soft [Abdomen Tenderness] : non-tender [Abdomen Mass (___ Cm)] : no abdominal mass palpated [Nail Clubbing] : no clubbing of the fingernails [Cyanosis, Localized] : no localized cyanosis [Petechial Hemorrhages (___cm)] : no petechial hemorrhages [] : no ischemic changes

## 2024-04-18 NOTE — PHYSICAL EXAM
[General Appearance - Well Developed] : well developed [Normal Appearance] : normal appearance [Well Groomed] : well groomed [General Appearance - Well Nourished] : well nourished [No Deformities] : no deformities [General Appearance - In No Acute Distress] : no acute distress [Heart Rate And Rhythm] : heart rate and rhythm were normal [Heart Sounds] : normal S1 and S2 [Murmurs] : no murmurs present [Respiration, Rhythm And Depth] : normal respiratory rhythm and effort [Auscultation Breath Sounds / Voice Sounds] : lungs were clear to auscultation bilaterally [Exaggerated Use Of Accessory Muscles For Inspiration] : no accessory muscle use [Clean] : clean [Dry] : dry [Well-Healed] : well-healed [Abdomen Soft] : soft [Abdomen Tenderness] : non-tender [Abdomen Mass (___ Cm)] : no abdominal mass palpated [Nail Clubbing] : no clubbing of the fingernails [Cyanosis, Localized] : no localized cyanosis [] : no ischemic changes [Petechial Hemorrhages (___cm)] : no petechial hemorrhages

## 2024-04-18 NOTE — ADDENDUM
[FreeTextEntry1] : Stacy BARTHOLOMEW assisted in documentation on 04/18/2024   acting as a scribe for Dr. Yaakov Scott.

## 2024-04-22 ENCOUNTER — OUTPATIENT (OUTPATIENT)
Dept: OUTPATIENT SERVICES | Facility: HOSPITAL | Age: 74
LOS: 1 days | End: 2024-04-22
Payer: MEDICAID

## 2024-04-22 ENCOUNTER — RESULT REVIEW (OUTPATIENT)
Age: 74
End: 2024-04-22

## 2024-04-22 DIAGNOSIS — Z95.1 PRESENCE OF AORTOCORONARY BYPASS GRAFT: Chronic | ICD-10-CM

## 2024-04-22 DIAGNOSIS — M47.816 SPONDYLOSIS WITHOUT MYELOPATHY OR RADICULOPATHY, LUMBAR REGION: ICD-10-CM

## 2024-04-22 DIAGNOSIS — Z95.810 PRESENCE OF AUTOMATIC (IMPLANTABLE) CARDIAC DEFIBRILLATOR: Chronic | ICD-10-CM

## 2024-04-22 DIAGNOSIS — Z98.890 OTHER SPECIFIED POSTPROCEDURAL STATES: Chronic | ICD-10-CM

## 2024-04-22 PROCEDURE — 72110 X-RAY EXAM L-2 SPINE 4/>VWS: CPT

## 2024-04-22 PROCEDURE — 72110 X-RAY EXAM L-2 SPINE 4/>VWS: CPT | Mod: 26

## 2024-04-23 DIAGNOSIS — M47.816 SPONDYLOSIS WITHOUT MYELOPATHY OR RADICULOPATHY, LUMBAR REGION: ICD-10-CM

## 2024-05-01 ENCOUNTER — OUTPATIENT (OUTPATIENT)
Dept: OUTPATIENT SERVICES | Facility: HOSPITAL | Age: 74
LOS: 1 days | End: 2024-05-01
Payer: MEDICAID

## 2024-05-01 DIAGNOSIS — Z95.1 PRESENCE OF AORTOCORONARY BYPASS GRAFT: Chronic | ICD-10-CM

## 2024-05-01 DIAGNOSIS — M25.552 PAIN IN LEFT HIP: ICD-10-CM

## 2024-05-01 DIAGNOSIS — Z98.890 OTHER SPECIFIED POSTPROCEDURAL STATES: Chronic | ICD-10-CM

## 2024-05-01 DIAGNOSIS — Z95.810 PRESENCE OF AUTOMATIC (IMPLANTABLE) CARDIAC DEFIBRILLATOR: Chronic | ICD-10-CM

## 2024-05-01 DIAGNOSIS — M47.816 SPONDYLOSIS WITHOUT MYELOPATHY OR RADICULOPATHY, LUMBAR REGION: ICD-10-CM

## 2024-05-01 PROCEDURE — 99204 OFFICE O/P NEW MOD 45 MIN: CPT

## 2024-05-02 DIAGNOSIS — M47.816 SPONDYLOSIS WITHOUT MYELOPATHY OR RADICULOPATHY, LUMBAR REGION: ICD-10-CM

## 2024-05-02 DIAGNOSIS — M25.552 PAIN IN LEFT HIP: ICD-10-CM

## 2024-05-15 ENCOUNTER — TRANSCRIPTION ENCOUNTER (OUTPATIENT)
Age: 74
End: 2024-05-15

## 2024-05-15 ENCOUNTER — INPATIENT (INPATIENT)
Facility: HOSPITAL | Age: 74
LOS: 1 days | Discharge: HOME | DRG: 201 | End: 2024-05-17
Attending: INTERNAL MEDICINE | Admitting: HOSPITALIST
Payer: MEDICAID

## 2024-05-15 VITALS
RESPIRATION RATE: 18 BRPM | OXYGEN SATURATION: 99 % | DIASTOLIC BLOOD PRESSURE: 62 MMHG | TEMPERATURE: 99 F | SYSTOLIC BLOOD PRESSURE: 105 MMHG | HEART RATE: 58 BPM | HEIGHT: 62.2 IN

## 2024-05-15 DIAGNOSIS — R07.9 CHEST PAIN, UNSPECIFIED: ICD-10-CM

## 2024-05-15 DIAGNOSIS — Z95.1 PRESENCE OF AORTOCORONARY BYPASS GRAFT: Chronic | ICD-10-CM

## 2024-05-15 DIAGNOSIS — Z98.890 OTHER SPECIFIED POSTPROCEDURAL STATES: Chronic | ICD-10-CM

## 2024-05-15 DIAGNOSIS — Z95.810 PRESENCE OF AUTOMATIC (IMPLANTABLE) CARDIAC DEFIBRILLATOR: Chronic | ICD-10-CM

## 2024-05-15 LAB
ALBUMIN SERPL ELPH-MCNC: 4.2 G/DL — SIGNIFICANT CHANGE UP (ref 3.5–5.2)
ALP SERPL-CCNC: 41 U/L — SIGNIFICANT CHANGE UP (ref 30–115)
ALT FLD-CCNC: 17 U/L — SIGNIFICANT CHANGE UP (ref 0–41)
ANION GAP SERPL CALC-SCNC: 11 MMOL/L — SIGNIFICANT CHANGE UP (ref 7–14)
ANION GAP SERPL CALC-SCNC: 12 MMOL/L — SIGNIFICANT CHANGE UP (ref 7–14)
AST SERPL-CCNC: 22 U/L — SIGNIFICANT CHANGE UP (ref 0–41)
BASOPHILS # BLD AUTO: 0.02 K/UL — SIGNIFICANT CHANGE UP (ref 0–0.2)
BASOPHILS NFR BLD AUTO: 0.3 % — SIGNIFICANT CHANGE UP (ref 0–1)
BILIRUB DIRECT SERPL-MCNC: 0.2 MG/DL — SIGNIFICANT CHANGE UP (ref 0–0.3)
BILIRUB INDIRECT FLD-MCNC: 0.4 MG/DL — SIGNIFICANT CHANGE UP (ref 0.2–1.2)
BILIRUB SERPL-MCNC: 0.6 MG/DL — SIGNIFICANT CHANGE UP (ref 0.2–1.2)
BUN SERPL-MCNC: 23 MG/DL — HIGH (ref 10–20)
BUN SERPL-MCNC: 24 MG/DL — HIGH (ref 10–20)
CALCIUM SERPL-MCNC: 8.5 MG/DL — SIGNIFICANT CHANGE UP (ref 8.4–10.5)
CALCIUM SERPL-MCNC: 8.8 MG/DL — SIGNIFICANT CHANGE UP (ref 8.4–10.5)
CHLORIDE SERPL-SCNC: 89 MMOL/L — LOW (ref 98–110)
CHLORIDE SERPL-SCNC: 91 MMOL/L — LOW (ref 98–110)
CO2 SERPL-SCNC: 22 MMOL/L — SIGNIFICANT CHANGE UP (ref 17–32)
CO2 SERPL-SCNC: 25 MMOL/L — SIGNIFICANT CHANGE UP (ref 17–32)
CREAT SERPL-MCNC: 0.7 MG/DL — SIGNIFICANT CHANGE UP (ref 0.7–1.5)
CREAT SERPL-MCNC: 0.8 MG/DL — SIGNIFICANT CHANGE UP (ref 0.7–1.5)
EGFR: 78 ML/MIN/1.73M2 — SIGNIFICANT CHANGE UP
EGFR: 92 ML/MIN/1.73M2 — SIGNIFICANT CHANGE UP
EOSINOPHIL # BLD AUTO: 0.01 K/UL — SIGNIFICANT CHANGE UP (ref 0–0.7)
EOSINOPHIL NFR BLD AUTO: 0.2 % — SIGNIFICANT CHANGE UP (ref 0–8)
GLUCOSE SERPL-MCNC: 123 MG/DL — HIGH (ref 70–99)
GLUCOSE SERPL-MCNC: 131 MG/DL — HIGH (ref 70–99)
HCT VFR BLD CALC: 31.7 % — LOW (ref 37–47)
HGB BLD-MCNC: 11.4 G/DL — LOW (ref 12–16)
IMM GRANULOCYTES NFR BLD AUTO: 0.5 % — HIGH (ref 0.1–0.3)
LACTATE SERPL-SCNC: 0.7 MMOL/L — SIGNIFICANT CHANGE UP (ref 0.7–2)
LIDOCAIN IGE QN: 16 U/L — SIGNIFICANT CHANGE UP (ref 7–60)
LYMPHOCYTES # BLD AUTO: 1.38 K/UL — SIGNIFICANT CHANGE UP (ref 1.2–3.4)
LYMPHOCYTES # BLD AUTO: 22.6 % — SIGNIFICANT CHANGE UP (ref 20.5–51.1)
MCHC RBC-ENTMCNC: 34.5 PG — HIGH (ref 27–31)
MCHC RBC-ENTMCNC: 36 G/DL — SIGNIFICANT CHANGE UP (ref 32–37)
MCV RBC AUTO: 96.1 FL — SIGNIFICANT CHANGE UP (ref 81–99)
MONOCYTES # BLD AUTO: 0.5 K/UL — SIGNIFICANT CHANGE UP (ref 0.1–0.6)
MONOCYTES NFR BLD AUTO: 8.2 % — SIGNIFICANT CHANGE UP (ref 1.7–9.3)
NEUTROPHILS # BLD AUTO: 4.16 K/UL — SIGNIFICANT CHANGE UP (ref 1.4–6.5)
NEUTROPHILS NFR BLD AUTO: 68.2 % — SIGNIFICANT CHANGE UP (ref 42.2–75.2)
NRBC # BLD: 0 /100 WBCS — SIGNIFICANT CHANGE UP (ref 0–0)
PLATELET # BLD AUTO: 204 K/UL — SIGNIFICANT CHANGE UP (ref 130–400)
PMV BLD: 9.6 FL — SIGNIFICANT CHANGE UP (ref 7.4–10.4)
POTASSIUM SERPL-MCNC: 3.9 MMOL/L — SIGNIFICANT CHANGE UP (ref 3.5–5)
POTASSIUM SERPL-MCNC: 4.5 MMOL/L — SIGNIFICANT CHANGE UP (ref 3.5–5)
POTASSIUM SERPL-SCNC: 3.9 MMOL/L — SIGNIFICANT CHANGE UP (ref 3.5–5)
POTASSIUM SERPL-SCNC: 4.5 MMOL/L — SIGNIFICANT CHANGE UP (ref 3.5–5)
PROT SERPL-MCNC: 6.4 G/DL — SIGNIFICANT CHANGE UP (ref 6–8)
RBC # BLD: 3.3 M/UL — LOW (ref 4.2–5.4)
RBC # FLD: 12.4 % — SIGNIFICANT CHANGE UP (ref 11.5–14.5)
SODIUM SERPL-SCNC: 123 MMOL/L — LOW (ref 135–146)
SODIUM SERPL-SCNC: 127 MMOL/L — LOW (ref 135–146)
TROPONIN T, HIGH SENSITIVITY RESULT: 20 NG/L — HIGH (ref 6–13)
TROPONIN T, HIGH SENSITIVITY RESULT: 21 NG/L — HIGH (ref 6–13)
WBC # BLD: 6.1 K/UL — SIGNIFICANT CHANGE UP (ref 4.8–10.8)
WBC # FLD AUTO: 6.1 K/UL — SIGNIFICANT CHANGE UP (ref 4.8–10.8)

## 2024-05-15 PROCEDURE — 93306 TTE W/DOPPLER COMPLETE: CPT

## 2024-05-15 PROCEDURE — 80048 BASIC METABOLIC PNL TOTAL CA: CPT

## 2024-05-15 PROCEDURE — 93289 INTERROG DEVICE EVAL HEART: CPT | Mod: 26

## 2024-05-15 PROCEDURE — 81001 URINALYSIS AUTO W/SCOPE: CPT

## 2024-05-15 PROCEDURE — 85027 COMPLETE CBC AUTOMATED: CPT

## 2024-05-15 PROCEDURE — 80053 COMPREHEN METABOLIC PANEL: CPT

## 2024-05-15 PROCEDURE — 83735 ASSAY OF MAGNESIUM: CPT

## 2024-05-15 PROCEDURE — 70450 CT HEAD/BRAIN W/O DYE: CPT | Mod: 26,MC

## 2024-05-15 PROCEDURE — 82570 ASSAY OF URINE CREATININE: CPT

## 2024-05-15 PROCEDURE — 99223 1ST HOSP IP/OBS HIGH 75: CPT

## 2024-05-15 PROCEDURE — 93010 ELECTROCARDIOGRAM REPORT: CPT

## 2024-05-15 PROCEDURE — 84443 ASSAY THYROID STIM HORMONE: CPT

## 2024-05-15 PROCEDURE — 99285 EMERGENCY DEPT VISIT HI MDM: CPT

## 2024-05-15 PROCEDURE — 84540 ASSAY OF URINE/UREA-N: CPT

## 2024-05-15 PROCEDURE — 71045 X-RAY EXAM CHEST 1 VIEW: CPT | Mod: 26

## 2024-05-15 PROCEDURE — 83935 ASSAY OF URINE OSMOLALITY: CPT

## 2024-05-15 PROCEDURE — 99254 IP/OBS CNSLTJ NEW/EST MOD 60: CPT

## 2024-05-15 PROCEDURE — 84300 ASSAY OF URINE SODIUM: CPT

## 2024-05-15 PROCEDURE — 36415 COLL VENOUS BLD VENIPUNCTURE: CPT

## 2024-05-15 RX ORDER — SACUBITRIL AND VALSARTAN 24; 26 MG/1; MG/1
1 TABLET, FILM COATED ORAL
Refills: 0 | Status: DISCONTINUED | OUTPATIENT
Start: 2024-05-15 | End: 2024-05-17

## 2024-05-15 RX ORDER — APIXABAN 2.5 MG/1
5 TABLET, FILM COATED ORAL EVERY 12 HOURS
Refills: 0 | Status: DISCONTINUED | OUTPATIENT
Start: 2024-05-15 | End: 2024-05-17

## 2024-05-15 RX ORDER — ATORVASTATIN CALCIUM 80 MG/1
1 TABLET, FILM COATED ORAL
Qty: 0 | Refills: 0 | DISCHARGE

## 2024-05-15 RX ORDER — FAMOTIDINE 10 MG/ML
1 INJECTION INTRAVENOUS
Refills: 0 | DISCHARGE

## 2024-05-15 RX ORDER — CLOPIDOGREL BISULFATE 75 MG/1
75 TABLET, FILM COATED ORAL DAILY
Refills: 0 | Status: DISCONTINUED | OUTPATIENT
Start: 2024-05-15 | End: 2024-05-15

## 2024-05-15 RX ORDER — SPIRONOLACTONE 25 MG/1
1 TABLET, FILM COATED ORAL
Qty: 0 | Refills: 0 | DISCHARGE

## 2024-05-15 RX ORDER — RANOLAZINE 500 MG/1
1000 TABLET, FILM COATED, EXTENDED RELEASE ORAL
Refills: 0 | Status: DISCONTINUED | OUTPATIENT
Start: 2024-05-15 | End: 2024-05-17

## 2024-05-15 RX ORDER — ASPIRIN/CALCIUM CARB/MAGNESIUM 324 MG
81 TABLET ORAL DAILY
Refills: 0 | Status: DISCONTINUED | OUTPATIENT
Start: 2024-05-15 | End: 2024-05-17

## 2024-05-15 RX ORDER — HEPARIN SODIUM 5000 [USP'U]/ML
5000 INJECTION INTRAVENOUS; SUBCUTANEOUS EVERY 8 HOURS
Refills: 0 | Status: DISCONTINUED | OUTPATIENT
Start: 2024-05-15 | End: 2024-05-15

## 2024-05-15 RX ORDER — PANTOPRAZOLE SODIUM 20 MG/1
40 TABLET, DELAYED RELEASE ORAL
Refills: 0 | Status: DISCONTINUED | OUTPATIENT
Start: 2024-05-15 | End: 2024-05-17

## 2024-05-15 RX ORDER — ISOSORBIDE MONONITRATE 60 MG/1
90 TABLET, EXTENDED RELEASE ORAL DAILY
Refills: 0 | Status: DISCONTINUED | OUTPATIENT
Start: 2024-05-15 | End: 2024-05-17

## 2024-05-15 RX ORDER — ASPIRIN/CALCIUM CARB/MAGNESIUM 324 MG
1 TABLET ORAL
Qty: 0 | Refills: 0 | DISCHARGE

## 2024-05-15 RX ORDER — FAMOTIDINE 10 MG/ML
20 INJECTION INTRAVENOUS ONCE
Refills: 0 | Status: COMPLETED | OUTPATIENT
Start: 2024-05-15 | End: 2024-05-15

## 2024-05-15 RX ORDER — ACETAMINOPHEN 500 MG
975 TABLET ORAL EVERY 8 HOURS
Refills: 0 | Status: DISCONTINUED | OUTPATIENT
Start: 2024-05-15 | End: 2024-05-17

## 2024-05-15 RX ORDER — RANOLAZINE 500 MG/1
1 TABLET, FILM COATED, EXTENDED RELEASE ORAL
Qty: 0 | Refills: 0 | DISCHARGE

## 2024-05-15 RX ORDER — ACETAMINOPHEN 500 MG
975 TABLET ORAL ONCE
Refills: 0 | Status: COMPLETED | OUTPATIENT
Start: 2024-05-15 | End: 2024-05-15

## 2024-05-15 RX ORDER — SACUBITRIL AND VALSARTAN 24; 26 MG/1; MG/1
1 TABLET, FILM COATED ORAL
Qty: 0 | Refills: 0 | DISCHARGE

## 2024-05-15 RX ORDER — ONDANSETRON 8 MG/1
4 TABLET, FILM COATED ORAL ONCE
Refills: 0 | Status: COMPLETED | OUTPATIENT
Start: 2024-05-15 | End: 2024-05-15

## 2024-05-15 RX ORDER — SODIUM CHLORIDE 9 MG/ML
1000 INJECTION INTRAMUSCULAR; INTRAVENOUS; SUBCUTANEOUS ONCE
Refills: 0 | Status: COMPLETED | OUTPATIENT
Start: 2024-05-15 | End: 2024-05-15

## 2024-05-15 RX ORDER — METOPROLOL TARTRATE 50 MG
100 TABLET ORAL DAILY
Refills: 0 | Status: DISCONTINUED | OUTPATIENT
Start: 2024-05-15 | End: 2024-05-17

## 2024-05-15 RX ORDER — METOPROLOL TARTRATE 50 MG
1 TABLET ORAL
Qty: 0 | Refills: 0 | DISCHARGE

## 2024-05-15 RX ORDER — MECLIZINE HCL 12.5 MG
25 TABLET ORAL ONCE
Refills: 0 | Status: COMPLETED | OUTPATIENT
Start: 2024-05-15 | End: 2024-05-15

## 2024-05-15 RX ADMIN — SODIUM CHLORIDE 1000 MILLILITER(S): 9 INJECTION INTRAMUSCULAR; INTRAVENOUS; SUBCUTANEOUS at 10:06

## 2024-05-15 RX ADMIN — Medication 81 MILLIGRAM(S): at 17:25

## 2024-05-15 RX ADMIN — Medication 25 MILLIGRAM(S): at 10:11

## 2024-05-15 RX ADMIN — RANOLAZINE 1000 MILLIGRAM(S): 500 TABLET, FILM COATED, EXTENDED RELEASE ORAL at 17:28

## 2024-05-15 RX ADMIN — APIXABAN 5 MILLIGRAM(S): 2.5 TABLET, FILM COATED ORAL at 20:55

## 2024-05-15 RX ADMIN — ISOSORBIDE MONONITRATE 90 MILLIGRAM(S): 60 TABLET, EXTENDED RELEASE ORAL at 17:28

## 2024-05-15 RX ADMIN — Medication 975 MILLIGRAM(S): at 11:35

## 2024-05-15 RX ADMIN — Medication 975 MILLIGRAM(S): at 20:51

## 2024-05-15 RX ADMIN — SACUBITRIL AND VALSARTAN 1 TABLET(S): 24; 26 TABLET, FILM COATED ORAL at 17:25

## 2024-05-15 RX ADMIN — Medication 100 MILLIGRAM(S): at 17:25

## 2024-05-15 RX ADMIN — FAMOTIDINE 20 MILLIGRAM(S): 10 INJECTION INTRAVENOUS at 10:04

## 2024-05-15 NOTE — H&P ADULT - ASSESSMENT
73yoF hx CAD s/p cabg '2011, HFrEF s/p AICD, htn, ?seizures, normocytic anemia, chronic hyponatremia, recent dx restless leg syndrome, here for vomiting and chest pain    #Pre-syncope likely secondary to orthostasis vs vasovagal  #Vomiting possibly drug-induced  -vomiting x4days, significant increase in episodes today associated with dizziness and chest pains  -ropinirole known to cause N/V, will hold for now  -ED reporting possibly due to gabapentin, pt states took one pill and felt bad so has stopped, unclear who prescribed this and unlikely to cause toxicity with one dose  -pt with ?remote hx seizure 2022 dx following syncope due to history given to neuro, EEG negative at that time, not on AED anymore and appears pt never followed up with neuro  -can send GI PCR if diarrhea  -f/u orthostatics  -c/w tele x24hrs    #Non-cardiac chest pain  #Troponemia  -can f/u cardiology cs  -re-introduce home meds as tolerated  -holding chlorthalidone and spironolactone for now given dehydration    #Hyponatremia, acute on chronic  -likely hypovolemic  -can f/u urine studies and serum osm  -start NS for now    #DVT ppx  -HSQ    #GI ppx  -home H2 to PPI    #Diet  -regular for now    #Activity  -IAT   73yoF hx CAD s/p cabg '2011, HFrEF s/p AICD, htn, ?seizures, normocytic anemia, chronic hyponatremia, recent dx restless leg syndrome, here for vomiting and chest pain    #Pre-syncope likely secondary to orthostasis vs vasovagal  #Vomiting possibly drug-induced  -vomiting x4days, significant increase in episodes today associated with dizziness and chest pains  -ropinirole known to cause N/V, will hold for now  -ED reporting possibly due to gabapentin, pt states took one pill and felt bad so has stopped, unclear who prescribed this and unlikely to cause toxicity with one dose  -pt with ?remote hx seizure 2022 dx following syncope due to history given to neuro, EEG negative at that time, not on AED anymore and appears pt never followed up with neuro  -can send GI PCR if diarrhea  -f/u orthostatics  -c/w tele x24hrs    #Non-cardiac chest pain  #Troponemia  #Hx CAD s/p CABG  #Hx HFrEF s/p AICD  #Essential Hypertension  -can f/u cardiology cs  -re-introduce home meds as tolerated  -holding chlorthalidone and spironolactone for now given dehydration    #Hyponatremia, acute on chronic  -likely hypovolemic  -can f/u urine studies and serum osm  -start NS for now    #DVT ppx  -HSQ    #GI ppx  -home H2 to PPI    #Diet  -regular for now    #Activity  -IAT   73yoF hx CAD s/p cabg '2011, HFrEF s/p AICD, htn, ?seizures, normocytic anemia, chronic hyponatremia, recent dx restless leg syndrome, here for vomiting and chest pain    #Pre-syncope likely secondary to orthostasis vs vasovagal  #Vomiting possibly drug-induced  -vomiting x4days, significant increase in episodes today associated with dizziness and chest pains  -ropinirole known to cause N/V, will hold for now  -ED reporting possibly due to gabapentin, pt states took one pill and felt bad so has stopped, unclear who prescribed this and unlikely to cause toxicity with one dose  -pt with ?remote hx seizure 2022 dx following syncope due to history given to neuro, EEG negative at that time, not on AED anymore and appears pt never followed up with neuro  -can send GI PCR if diarrhea  -f/u orthostatics  -c/w tele x24hrs    #Non-cardiac chest pain  #Troponemia  #Hx CAD s/p CABG  #Hx HFrEF s/p AICD  #Essential Hypertension  -can f/u cardiology cs  -re-introduce home meds as tolerated  -holding chlorthalidone and spironolactone for now given dehydration    #Hyponatremia, acute on chronic  -likely hypovolemic, much improved after NS bolus  -can f/u urine studies and serum osm  -regular diet for now    #DVT ppx  -HSQ    #GI ppx  -home H2 to PPI    #Diet  -regular for now    #Activity  -IAT

## 2024-05-15 NOTE — H&P ADULT - HISTORY OF PRESENT ILLNESS
73yoF hx CAD s/p cabg '2011, HFrEF s/p AICD, htn, ?seizures, normocytic anemia, chronic hyponatremia, recent dx restless leg syndrome, here for vomiting and chest pain. Per son pt has had vomiting since 5/12/24, non bloody. Today, pt had about 8 episodes then started complaining about dizziness and L sided chest pain. Son concerned pt had not received morning medications due to vomiting and wanted eval given her need for heart medications.     Labs significant for trop 21->20, Na 123, lactate 0.7    Imaging: CTh without acute changes, CXR without opacity.    EKG: no acute changes; L-axis devation, qs in 2,3, aVF V4-6, 1st degree AVB intermittent    Pt ordered for 1L NS bolus, tylenol, meclizine, famotidine IV    Vitals  T(C): 36.4 (05-15-24 @ 16:00), Max: 37 (05-15-24 @ 09:19)  T(F): 97.6 (05-15-24 @ 16:00), Max: 98.6 (05-15-24 @ 09:19)  HR: 71 (05-15-24 @ 16:00) (58 - 71)  BP: 125/66 (05-15-24 @ 16:00) (105/62 - 125/66)  RR: 18 (05-15-24 @ 16:00) (18 - 18)  SpO2: 95% (05-15-24 @ 16:00) (95% - 99%)  Wt(kg): --    Review of Systems  CONSTITUTIONAL:  No weakness, fevers or chills  EYES/ENT:  No visual changes;  No vertigo or throat pain   NECK:  No pain or stiffness  RESPIRATORY:  No cough, wheezing, hemoptysis; No shortness of breath  CARDIOVASCULAR:  L anterior lower chest wall pain   GASTROINTESTINAL:  No abdominal or epigastric pain. No nausea, vomiting, or hematemesis; No diarrhea or constipation. No melena or hematochezia.  GENITOURINARY:  No dysuria, frequency or hematuria  MUSCULOSKELETAL: No muscle pains or joint pains  NEUROLOGICAL:  No numbness or weakness  SKIN:  No itching, rashes      Physical Exam  GENERAL: AA. Well-nourished, laying in bed appearing in no acute distress  HEENT: No FNDs, atraumatic, normocephalic, moist mucus membranes  LUNGS: TTP over L anterior chest wall. Clear to auscultation bilaterally  HEART: S1/S2. No heaves or thrills  ABD: Distended but soft, no TTP  EXT/NEURO: Strength, sensation, ROM grossly intact  SKIN: No breaks, erythema, edema

## 2024-05-15 NOTE — H&P ADULT - ATTENDING COMMENTS
73yoF hx CAD s/p cabg '2011, HFrEF s/p AICD, htn, ?seizures, normocytic anemia, chronic hyponatremia, recent dx restless leg syndrome, here for vomiting and chest pain.    Agree  with assessment  except for changes below.   Vital Signs Last 24 Hrs  T(C): 36.4 (15 May 2024 16:00), Max: 37 (15 May 2024 09:19)  T(F): 97.6 (15 May 2024 16:00), Max: 98.6 (15 May 2024 09:19)  HR: 71 (15 May 2024 16:00) (58 - 71)  BP: 125/66 (15 May 2024 16:00) (105/62 - 125/66)  BP(mean): --  RR: 18 (15 May 2024 16:00) (18 - 18)  SpO2: 95% (15 May 2024 16:00) (95% - 99%)    Parameters below as of 15 May 2024 16:00  Patient On (Oxygen Delivery Method): room air    CTH: No acute intracranial pathology. No evidence of midline shift, mass   effect or intracranial hemorrhage.      IMPRESSION   Presyncopal  Episode in the  Setting of Persistent Vomitting  Chest Pain Likely Stable  Angina     Suspected new onset Afib  Hemodynamically Stable   Mild Hyponatremia   Hx CAD s/p CABG (2011) s/p PCI (2012) HFrEF s/p AICD  CHADSVASC 5   Device Interrogation - negative for VT; couldn't assess atrial rhythm as AICD is single lead  Trop Stable  21-20  ECG no Ischemic Changes   Tele  Monitoring   IVF Resuscitation s/p 1L Bolus, gentle Hydration   Send BNP  Stop Lasix  in the setting of vomiting    EP following   Start Eliquis and hold  Plavix   Continue with home meds - Entresto, metoprolol, spironolactone, imdur, ranexa and ASA,   MCOT at DC to evaluate a.fib diagnosis and burden.  F/u Urine Studies   Cardiology Recs Noted 73yoF hx CAD s/p cabg '2011, HFrEF s/p AICD, htn, ?seizures, normocytic anemia, chronic hyponatremia, recent dx restless leg syndrome, here for vomiting and chest pain.    Agree  with assessment  except for changes below.   Vital Signs Last 24 Hrs  T(C): 36.4 (15 May 2024 16:00), Max: 37 (15 May 2024 09:19)  T(F): 97.6 (15 May 2024 16:00), Max: 98.6 (15 May 2024 09:19)  HR: 71 (15 May 2024 16:00) (58 - 71)  BP: 125/66 (15 May 2024 16:00) (105/62 - 125/66)  BP(mean): --  RR: 18 (15 May 2024 16:00) (18 - 18)  SpO2: 95% (15 May 2024 16:00) (95% - 99%)    Parameters below as of 15 May 2024 16:00  Patient On (Oxygen Delivery Method): room air    CTH: No acute intracranial pathology. No evidence of midline shift, mass   effect or intracranial hemorrhage.    PHYSICAL EXAM  GENERAL: NAD, Obese   HEAD:  NCAT, EOMI, MM  NECK: Supple, Nontender  NERVOUS SYSTEM:  AAOx3, NFD  CHEST/LUNG: +bs b/l, No wheezing   HEART: +s1s2 RRR  ABDOMEN: soft, NT/ND  EXTREMITIES:  pp, no edema  SKIN: age related skin changes     IMPRESSION   Presyncopal  Episode in the  Setting of Persistent Vomitting ( Now  resolved)  Chest Pain Likely Stable  Angina     Suspected new onset Afib  Hemodynamically Stable   Mild Hyponatremia   Hx CAD s/p CABG (2011) s/p PCI (2012) HFrEF s/p AICD  CHADSVASC 5   Device Interrogation - negative for VT; couldn't assess atrial rhythm as AICD is single lead  Trop Stable  21-20  ECG no Ischemic Changes   Tele  Monitoring   IVF Resuscitation s/p 1L Bolus, gentle Hydration   Send BNP  Stop Lasix  in the setting of vomiting    EP following   Start Eliquis and hold  Plavix   Continue with home meds - Entresto, metoprolol, spironolactone, imdur, ranexa and ASA,   MCOT at DC to evaluate a.fib diagnosis and burden.  F/u Urine Studies   Cardiology Recs Noted    Seen on 05/15

## 2024-05-15 NOTE — ED PROVIDER NOTE - OBJECTIVE STATEMENT
73-year-old female with history of CAD status post CABG, AICD, hypertension, seizures brought in by ambulance accompanied by son complaining of dizziness described as room spinning with nausea and vomiting.  Patient also complaining of left-sided chest tightness which shortness of breath.  Patient denies any abdominal pain, headache, weakness, fever or chills.

## 2024-05-15 NOTE — ED PROVIDER NOTE - CLINICAL SUMMARY MEDICAL DECISION MAKING FREE TEXT BOX
Labs and EKG were ordered and reviewed.  Imaging was ordered and reviewed by me.  Appropriate medications for patient's presenting complaints were ordered and effects were reassessed.  Patient's records (prior hospital, ED visit, and/or nursing home notes if available) were reviewed.  Additional history was obtained from EMS, family, and/or PCP (where available).  Escalation to admission/observation was considered.  Patient requires inpatient hospitalization - monitored setting.

## 2024-05-15 NOTE — ED ADULT NURSE NOTE - NSICDXPASTSURGICALHX_GEN_ALL_CORE_FT
PAST SURGICAL HISTORY:  AICD (automatic cardioverter/defibrillator) present     History of left heart catheterization (LHC) MULTIPLE    S/P CABG (coronary artery bypass graft) 2011 [facility]

## 2024-05-15 NOTE — DISCHARGE NOTE NURSING/CASE MANAGEMENT/SOCIAL WORK - PATIENT PORTAL LINK FT
You can access the FollowMyHealth Patient Portal offered by [facility] by registering at the following website: [email or web address]. By joining [employer]’s FollowMyHealth portal, you will also be able to view your health information using other applications (apps) compatible with our system.

## 2024-05-15 NOTE — ED PROVIDER NOTE - PROGRESS NOTE DETAILS
Alerted by RN patient had run of unsustained vtach.  Patient denies any chest pain currently.  Spoke to cardiac fellow Dr. Young. Requesting AICD interrogation.  Called EP spoke to Mile, requesting consult. I was called for possible V-tach.  I looked at the rhythm strip and there was a wide complex tachycardia with about 10 beats that seemed like non-sustaine V-tach.  EP and cardio called.  EP states they interrogated and did not see any events.  Sodium 123.  Patient is awake and alert.  Appears likely hypovolemic.  But will add urine osm.  Neuro exam unremarkable.  I do not think patient requires intensive care treatment.  I feel like the most likely cause of symptoms is the recent initiation of gabapentin a few days ago.  But will admit for monitoring and workup.

## 2024-05-15 NOTE — ED PROVIDER NOTE - PHYSICAL EXAMINATION
Vital Signs: I have reviewed the initial vital signs.  Constitutional: NAD, well-nourished, appears stated age, no acute distress.  HEENT: Airway patent, moist MM, no erythema/swelling/deformity of oral structures. EOMI, PERRLA.  CV: regular rate, regular rhythm, well-perfused extremities, 2+ b/l DP and radial pulses equal.  Lungs: BCTA, no increased WOB.  ABD: NT ND, no guarding or rebound, no pulsatile mass, no hernias.   MSK: Neck supple, nontender, nl ROM, no stepoff. Chest nontender. Back nontender in TLS spine or to b/l bony structures or flanks. Ext nontender, nl rom, no deformity.   INTEG: Skin warm, dry, no rash.  NEURO: A&Ox3, normal strength, nl sensation throughout, normal speech.   PSYCH: Calm, cooperative, normal affect and interaction.

## 2024-05-15 NOTE — ED ADULT NURSE REASSESSMENT NOTE - NS ED NURSE REASSESS COMMENT FT1
pt with runs of v tach, pt asymptomatic at this time. ekg done, pt stable , ep consult called to interrogate aicd.

## 2024-05-15 NOTE — CONSULT NOTE ADULT - ASSESSMENT
IMPRESSION   #Chest pain - stable angina   #CAD s/p CABG (2011) s/p PCI (2012)         -  comparable to previous Ecgs, no ischemia changes, Left axis deviation, poor r wave progression          -  trops 21---20  #h/o HFrEF s/p AICD         - Device interrogation - negative for VT; couldn't assess atrial rhythm as AICD is single lead  #Chronic Systolic CHF  #suspected paroxysmal A.fib - new diagnosis           - Chadsvasc - 5         - rate controlled      PLAN  - Ruled out ACS from ecg, trops and pt's history  - Pt will not require ischemia work-up  - One of the ECG's is consistent with a.fib. However, telemetry and other ecgs appear to be switching between sinus rhythm and PACs  - discussed the findings with patient and patient's family  - Device interrogation - negative for VT; couldn't assess atrial rhythm as AICD is single lead  - continue with home meds - entresto, metoprolol, spironolactone, imdur and ASA, Stop plavix   - start eliquis 5mg bid for new paroxysmal a.fib, will hold plavix   - MCOT at DC to evaluate a.fib diagnosis and burden.    IMPRESSION   #Chest pain - stable angina   #CAD s/p CABG (2011) s/p PCI (2012)         -  comparable to previous Ecgs, no ischemia changes, Left axis deviation, poor r wave progression          -  trops 21---20  #h/o HFrEF s/p AICD         - Device interrogation - negative for VT; couldn't assess atrial rhythm as AICD is single lead  #Chronic Systolic CHF  #suspected paroxysmal A.fib - new diagnosis           - Chadsvasc - 5         - rate controlled      PLAN  - Ruled out ACS from ecg, trops and pt's history  - Pt will not require ischemia work-up  - One of the ECG's is consistent with a.fib. However, telemetry and other ecgs appear to be switching between sinus rhythm and PACs  - discussed the findings with patient and patient's family  - Device interrogation - negative for VT; couldn't assess atrial rhythm as AICD is single lead  - continue with home meds - entresto, metoprolol, spironolactone, imdur, ranexa and ASA,   - Stop plavix   - start eliquis 5mg bid for new paroxysmal a.fib, will hold plavix   - Obtain repeat TTE   - MCOT at ME to evaluate a.fib diagnosis and burden.      IMPRESSION   #Chest pain - stable angina   #CAD s/p CABG (2011) s/p PCI (2012)         -  comparable to previous Ecgs, no ischemia changes, Left axis deviation, poor r wave progression          -  trops 21---20  #h/o HFrEF s/p AICD EF recovered in an outpatient echo          - Device interrogation - negative for VT; couldn't assess atrial rhythm as AICD is single lead  #suspected paroxysmal A.fib - new diagnosis           - Chadsvasc - 5         - rate controlled      PLAN  - ACS ruled out  - One of the ECG's is consistent with a.fib. However, telemetry and other ecgs appear to be switching between sinus rhythm and PACs  - discussed the findings with patient and patient's family  - Device interrogation - negative for VT; couldn't assess atrial rhythm as AICD is single lead  - continue with home meds - entresto, metoprolol, spironolactone, imdur, ranexa and ASA,   - Stop plavix   - start eliquis 5mg bid for new paroxysmal a.fib, will hold plavix   -  TTE   -EP for  MCOT upon  DC given suspected afib on EKG and tele.

## 2024-05-15 NOTE — ED PROVIDER NOTE - WHICH SHOWED
ED CXR prelim, my independent interpretation - Dr. Garcia Caldera: [No PTX, No infiltrates, No Free air]  ED EKG: my independent interpretation - Dr. Garcia Caldera :  see results documented

## 2024-05-15 NOTE — ED PROVIDER NOTE - CARE PLAN
1 Principal Discharge DX:	Chest pain  Secondary Diagnosis:	Dizziness   Principal Discharge DX:	Chest pain  Secondary Diagnosis:	Dizziness  Secondary Diagnosis:	Hyponatremia

## 2024-05-15 NOTE — H&P ADULT - NSHPLABSRESULTS_GEN_ALL_CORE
CBC Full  -  ( 15 May 2024 11:24 )  WBC Count : 6.10 K/uL  RBC Count : 3.30 M/uL  Hemoglobin : 11.4 g/dL  Hematocrit : 31.7 %  Platelet Count - Automated : 204 K/uL  Mean Cell Volume : 96.1 fL  Mean Cell Hemoglobin : 34.5 pg  Mean Cell Hemoglobin Concentration : 36.0 g/dL  Auto Neutrophil # : 4.16 K/uL  Auto Lymphocyte # : 1.38 K/uL  Auto Monocyte # : 0.50 K/uL  Auto Eosinophil # : 0.01 K/uL  Auto Basophil # : 0.02 K/uL  Auto Neutrophil % : 68.2 %  Auto Lymphocyte % : 22.6 %  Auto Monocyte % : 8.2 %  Auto Eosinophil % : 0.2 %  Auto Basophil % : 0.3 %    05-15    127<L>  |  91<L>  |  23<H>  ----------------------------<  123<H>  4.5   |  25  |  0.7    Ca    8.5      15 May 2024 16:18    TPro  6.4  /  Alb  4.2  /  TBili  0.6  /  DBili  0.2  /  AST  22  /  ALT  17  /  AlkPhos  41  05-15    LIVER FUNCTIONS - ( 15 May 2024 10:05 )  Alb: 4.2 g/dL / Pro: 6.4 g/dL / ALK PHOS: 41 U/L / ALT: 17 U/L / AST: 22 U/L / GGT: x           Urinalysis Basic - ( 15 May 2024 16:18 )    Color: x / Appearance: x / SG: x / pH: x  Gluc: 123 mg/dL / Ketone: x  / Bili: x / Urobili: x   Blood: x / Protein: x / Nitrite: x   Leuk Esterase: x / RBC: x / WBC x   Sq Epi: x / Non Sq Epi: x / Bacteria: x

## 2024-05-15 NOTE — CONSULT NOTE ADULT - SUBJECTIVE AND OBJECTIVE BOX
HPI    73-year-old female with history of CAD status post CABG, AICD, hypertension, seizures brought in by ambulance accompanied by son complaining of dizziness described as room spinning with nausea and vomiting.  Patient also complaining of left-sided chest tightness which shortness of breath.  Patient denies any abdominal pain, headache, weakness, fever or chills    CARDIOLOGY CONSULT    PAST MEDICAL & SURGICAL HISTORY  CAD (coronary artery disease)  Hypercholesterolemia  HTN (hypertension)  S/P CABG (coronary artery bypass graft)  2011 [facility]  History of left heart catheterization (LHC)  MULTIPLE  AICD (automatic cardioverter/defibrillator) present    FAMILY HISTORY  FAMILY HISTORY:      SOCIAL HISTORY  []smoker  []Alcohol  []Drug    ALLERGIES  No Known Allergies      MEDICATIONS:  MEDICATIONS  (STANDING):    MEDICATIONS  (PRN):      HOME MEDICATIONS  Home Medications:  Aspir 81 oral delayed release tablet: 1 tab(s) orally once a day (16 Jan 2020 14:29)  atorvastatin 40 mg oral tablet: 1 tab(s) orally once a day (31 Jul 2022 23:35)  clopidogrel 75 mg oral tablet: 1 tab(s) orally once a day (16 Jan 2020 14:29)  Entresto 24 mg-26 mg oral tablet: 1 tab(s) orally 2 times a day (31 Jul 2022 23:37)  famotidine 20 mg oral tablet: orally 2 times a day (31 Jul 2022 23:36)  metoprolol succinate 100 mg oral tablet, extended release: 1 tab(s) orally once a day (16 Jan 2020 14:29)  Ranexa 1000 mg oral tablet, extended release: 1 tab(s) orally 2 times a day (31 Jul 2022 23:34)  spironolactone 25 mg oral tablet: 1 tab(s) orally once a day (31 Jul 2022 23:38)      VITALS   T(F): 98.6 (05-15 @ 09:19), Max: 98.6 (05-15 @ 09:19)  HR: 58 (05-15 @ 09:19) (58 - 58)  BP: 105/62 (05-15 @ 09:19) (105/62 - 105/62)  BP(mean): --  RR: 18 (05-15 @ 09:19) (18 - 18)  SpO2: 99% (05-15 @ 09:19) (99% - 99%)    I&O's Summary      REVIEW OF SYSTEMS  CONSTITUTIONAL: No weakness, fevers or chills  EYES: No visual changes  ENT: No vertigo or throat pain   NECK: No pain or stiffness  RESPIRATORY: No cough, wheezing, hemoptysis; No shortness of breath  CARDIOVASCULAR: No chest pain or palpitations  GASTROINTESTINAL: No abdominal or epigastric pain. No nausea, vomiting, or hematemesis; No diarrhea or constipation. No melena or hematochezia.  GENITOURINARY: No dysuria, frequency or hematuria  NEUROLOGICAL: No numbness or weakness  SKIN: No itching, no rashes  MSK: No pain    PHYSICAL EXAM  NEURO: patient is awake , alert and oriented  GEN: Not in acute distress  NECK: no thyroid enlargement, no JVD  LUNGS: Clear to auscultation bilaterally   CARDIOVASCULAR: S1/S2 present, RRR , no murmurs or rubs, no carotid bruits,  + PP bilaterally  ABD: Soft, non-tender, non-distended, +BS  EXT: No KYLER  SKIN: Intact    LABS:                        11.4   6.10  )-----------( 204      ( 15 May 2024 11:24 )             31.7     05-15    123<L>  |  89<L>  |  24<H>  ----------------------------<  131<H>  3.9   |  22  |  0.8    Ca    8.8      15 May 2024 10:05    TPro  6.4  /  Alb  4.2  /  TBili  0.6  /  DBili  0.2  /  AST  22  /  ALT  17  /  AlkPhos  41  05-15        Troponin Trend:            RADIOLOGY  -CXR:  -TTE:  -CCTA:  -STRESS TEST:  -CATHETERIZATION:    ECG    TELEMETRY EVENTS   HPI    73yoF hx CAD s/p cabg '2011, HFrEF s/p AICD, htn, ?seizures, normocytic anemia, chronic hyponatremia, recent dx restless leg syndrome, here for vomiting and chest pain. Per son pt has had vomiting since 5/12/24, non bloody. Today, pt had about 8 episodes then started complaining about dizziness and L sided chest pain. Son concerned pt had not received morning medications due to vomiting and wanted eval given her need for heart medications.     Labs significant for trop 21->20, Na 123, lactate 0.7  Imaging: CTh without acute changes, CXR without opacity.  EKG: no acute changes; L-axis devation, qs in 2,3, aVF V4-6, 1st degree AVB intermittent  Pt ordered for 1L NS bolus, tylenol, meclizine, famotidine IV      CARDIOLOGY CONSULT    Cardiology was consulted to evaluate patient's chest pain. Patient's chest pain worsened at rest since afternoon yesterday, Severe overnight. However it was not associated SOB, palpitations or lightheadedness. Pt does have complaints of vomiting, positional vertigo and ear pain for which she is being evaluated.  On eval, ECG had no st changes, trops 21---20. however, she was noted to have a.fib on one of the ECGs        PAST MEDICAL & SURGICAL HISTORY  CAD (coronary artery disease)  Hypercholesterolemia  HTN (hypertension)  S/P CABG (coronary artery bypass graft)  2011 [facility]  History of left heart catheterization (LHC)  MULTIPLE  AICD (automatic cardioverter/defibrillator) present    ALLERGIES  No Known Allergies      MEDICATIONS:  MEDICATIONS  (STANDING):    MEDICATIONS  (PRN):      HOME MEDICATIONS  Home Medications:  Aspir 81 oral delayed release tablet: 1 tab(s) orally once a day (16 Jan 2020 14:29)  atorvastatin 40 mg oral tablet: 1 tab(s) orally once a day (31 Jul 2022 23:35)  clopidogrel 75 mg oral tablet: 1 tab(s) orally once a day (16 Jan 2020 14:29)  Entresto 24 mg-26 mg oral tablet: 1 tab(s) orally 2 times a day (31 Jul 2022 23:37)  famotidine 20 mg oral tablet: orally 2 times a day (31 Jul 2022 23:36)  metoprolol succinate 100 mg oral tablet, extended release: 1 tab(s) orally once a day (16 Jan 2020 14:29)  Ranexa 1000 mg oral tablet, extended release: 1 tab(s) orally 2 times a day (31 Jul 2022 23:34)  spironolactone 25 mg oral tablet: 1 tab(s) orally once a day (31 Jul 2022 23:38)      VITALS   T(F): 98.6 (05-15 @ 09:19), Max: 98.6 (05-15 @ 09:19)  HR: 58 (05-15 @ 09:19) (58 - 58)  BP: 105/62 (05-15 @ 09:19) (105/62 - 105/62)  BP(mean): --  RR: 18 (05-15 @ 09:19) (18 - 18)  SpO2: 99% (05-15 @ 09:19) (99% - 99%)    I&O's Summary      REVIEW OF SYSTEMS  CONSTITUTIONAL: No weakness, fevers or chills  EYES: No visual changes  ENT: No vertigo or throat pain   NECK: No pain or stiffness  RESPIRATORY: No cough, wheezing, hemoptysis; No shortness of breath  CARDIOVASCULAR: No chest pain or palpitations  GASTROINTESTINAL: No abdominal or epigastric pain. No nausea, vomiting, or hematemesis; No diarrhea or constipation. No melena or hematochezia.  GENITOURINARY: No dysuria, frequency or hematuria  NEUROLOGICAL: No numbness or weakness  SKIN: No itching, no rashes  MSK: No pain    PHYSICAL EXAM  NEURO: patient is awake , alert and oriented  GEN: Not in acute distress  NECK: no thyroid enlargement, no JVD  LUNGS: Clear to auscultation bilaterally   CARDIOVASCULAR: S1/S2 present, RRR , no murmurs or rubs, no carotid bruits,  + PP bilaterally  ABD: Soft, non-tender, non-distended, +BS  EXT: No KYLER  SKIN: Intact    LABS:                        11.4   6.10  )-----------( 204      ( 15 May 2024 11:24 )             31.7     05-15    123<L>  |  89<L>  |  24<H>  ----------------------------<  131<H>  3.9   |  22  |  0.8    Ca    8.8      15 May 2024 10:05    TPro  6.4  /  Alb  4.2  /  TBili  0.6  /  DBili  0.2  /  AST  22  /  ALT  17  /  AlkPhos  41  05-15      Troponin Trend: 21 -----> 20    RADIOLOGY  -CXR: Cardiomegaly, RV lead in place. rest wnl   -TTE: Aug 2023 - EF 55-60%; Mod MR /TR   -CATHETERIZATION: 2020 - patent LIMA-LAD, SGV-D2 graft; Patent RCA stent; known occluded SGV to RCA    ECG  Overall comparable to previous Ecgs, no ischemia changes  1 ecg with a.fib     TELEMETRY EVENTS  Intermittent A.fib with controlled rate, mostly in sinus rhythm with occasional PVCs   HPI    73yoF hx CAD s/p cabg '2011, HFrEF s/p AICD, htn, ?seizures, normocytic anemia, chronic hyponatremia, recent dx restless leg syndrome, here for vomiting and chest pain. Per son pt has had vomiting since 5/12/24, non bloody. Today, pt had about 8 episodes then started complaining about dizziness and L sided chest pain. Son concerned pt had not received morning medications due to vomiting and wanted eval given her need for heart medications.     Labs significant for trop 21->20, Na 123, lactate 0.7  Imaging: CTh without acute changes, CXR without opacity.  EKG: no acute changes; L-axis devation, qs in 2,3, aVF V4-6, 1st degree AVB intermittent  Pt ordered for 1L NS bolus, tylenol, meclizine, famotidine IV      CARDIOLOGY CONSULT    Cardiology was consulted to evaluate patient's chest pain overnight  was not associated SOB, palpitations or lightheadedness. Pt does have complaints of vomiting, positional vertigo and ear pain for which she is being evaluated.  On eval, ECG had no st changes, trops 21---20. however, she was noted to have a.fib on one of the ECGs        PAST MEDICAL & SURGICAL HISTORY  CAD (coronary artery disease)  Hypercholesterolemia  HTN (hypertension)  S/P CABG (coronary artery bypass graft)  2011 [facility]  History of left heart catheterization (LHC)  MULTIPLE  AICD (automatic cardioverter/defibrillator) present    ALLERGIES  No Known Allergies      MEDICATIONS:  MEDICATIONS  (STANDING):    MEDICATIONS  (PRN):      HOME MEDICATIONS  Home Medications:  Aspir 81 oral delayed release tablet: 1 tab(s) orally once a day (16 Jan 2020 14:29)  atorvastatin 40 mg oral tablet: 1 tab(s) orally once a day (31 Jul 2022 23:35)  clopidogrel 75 mg oral tablet: 1 tab(s) orally once a day (16 Jan 2020 14:29)  Entresto 24 mg-26 mg oral tablet: 1 tab(s) orally 2 times a day (31 Jul 2022 23:37)  famotidine 20 mg oral tablet: orally 2 times a day (31 Jul 2022 23:36)  metoprolol succinate 100 mg oral tablet, extended release: 1 tab(s) orally once a day (16 Jan 2020 14:29)  Ranexa 1000 mg oral tablet, extended release: 1 tab(s) orally 2 times a day (31 Jul 2022 23:34)  spironolactone 25 mg oral tablet: 1 tab(s) orally once a day (31 Jul 2022 23:38)      VITALS   T(F): 98.6 (05-15 @ 09:19), Max: 98.6 (05-15 @ 09:19)  HR: 58 (05-15 @ 09:19) (58 - 58)  BP: 105/62 (05-15 @ 09:19) (105/62 - 105/62)  BP(mean): --  RR: 18 (05-15 @ 09:19) (18 - 18)  SpO2: 99% (05-15 @ 09:19) (99% - 99%)    I&O's Summary      REVIEW OF SYSTEMS  CONSTITUTIONAL: No weakness, fevers or chills  EYES: No visual changes  ENT: No vertigo or throat pain   NECK: No pain or stiffness  RESPIRATORY: No cough, wheezing, hemoptysis; No shortness of breath  CARDIOVASCULAR: No chest pain or palpitations  GASTROINTESTINAL: No abdominal or epigastric pain. No nausea, vomiting, or hematemesis; No diarrhea or constipation. No melena or hematochezia.  GENITOURINARY: No dysuria, frequency or hematuria  NEUROLOGICAL: No numbness or weakness  SKIN: No itching, no rashes  MSK: No pain    PHYSICAL EXAM  NEURO: patient is awake , alert and oriented  GEN: Not in acute distress  NECK: no thyroid enlargement, no JVD  LUNGS: Clear to auscultation bilaterally   CARDIOVASCULAR: S1/S2 present, RRR , no murmurs or rubs, no carotid bruits,  + PP bilaterally  ABD: Soft, non-tender, non-distended, +BS  EXT: No KYLER  SKIN: Intact    LABS:                        11.4   6.10  )-----------( 204      ( 15 May 2024 11:24 )             31.7     05-15    123<L>  |  89<L>  |  24<H>  ----------------------------<  131<H>  3.9   |  22  |  0.8    Ca    8.8      15 May 2024 10:05    TPro  6.4  /  Alb  4.2  /  TBili  0.6  /  DBili  0.2  /  AST  22  /  ALT  17  /  AlkPhos  41  05-15      Troponin Trend: 21 -----> 20    RADIOLOGY  -CXR: Cardiomegaly, RV lead in place. rest wnl   -TTE: Aug 2023 - EF 55-60%; Mod MR /TR   -CATHETERIZATION: 2020 - patent LIMA-LAD, SGV-D2 graft; Patent RCA stent; known occluded SGV to RCA    ECG  Overall comparable to previous Ecgs, no ischemia changes  1 ecg with a.fib     TELEMETRY EVENTS  Intermittent A.fib with controlled rate, mostly in sinus rhythm with occasional PVCs

## 2024-05-15 NOTE — CONSULT NOTE ADULT - ATTENDING COMMENTS
Patient seen and examined at bedside  73 year old female patient with PMHx of CAD s/p CABG 2011., then PCI to RCA 2012 last cath few years ago showed patent LIMA to LAD, patent SVG to Diag , patent stent in RCA , however severe native diag and distal LAD, had chronic stable angina on optimized medical treatment.   admitted to hospital for chest pain , also true vertigo , some ear pain with nausa and vomiting  chest pain likely related to chronic angina in setting of dehydration and some stress, currently resolved, ACS ruled out  EKG no acute ischemic changes, however one EKG is suspicious for afib, tele, paroxysmal short afib noted  currently stable  no evidence of heart failure or ACS    will recommend to continue anti ischemic medication   will continue asa, stop plavix and start full AC with eliquis for likely pafib for primary stroke prevention (elevated CHADSVASC) , suggest MCOT with EP upon discharge   correct electrolytes  consider ENT evaluation for true vertigo  if no significant new finding on echo no need for further inpatient cardiac workup  other plan as outlined above

## 2024-05-15 NOTE — ED PROVIDER NOTE - NSICDXPASTSURGICALHX_GEN_ALL_CORE_FT
PAST SURGICAL HISTORY:  AICD (automatic cardioverter/defibrillator) present     History of left heart catheterization (LHC) MULTIPLE    S/P CABG (coronary artery bypass graft) 2011 [facility]

## 2024-05-15 NOTE — ED ADULT NURSE NOTE - NSFALLRISKINTERV_ED_ALL_ED

## 2024-05-16 ENCOUNTER — APPOINTMENT (OUTPATIENT)
Dept: NEUROSURGERY | Facility: CLINIC | Age: 74
End: 2024-05-16

## 2024-05-16 ENCOUNTER — RESULT REVIEW (OUTPATIENT)
Age: 74
End: 2024-05-16

## 2024-05-16 ENCOUNTER — TRANSCRIPTION ENCOUNTER (OUTPATIENT)
Age: 74
End: 2024-05-16

## 2024-05-16 LAB
ALBUMIN SERPL ELPH-MCNC: 4.3 G/DL — SIGNIFICANT CHANGE UP (ref 3.5–5.2)
ALP SERPL-CCNC: 34 U/L — SIGNIFICANT CHANGE UP (ref 30–115)
ALT FLD-CCNC: 20 U/L — SIGNIFICANT CHANGE UP (ref 0–41)
ANION GAP SERPL CALC-SCNC: 10 MMOL/L — SIGNIFICANT CHANGE UP (ref 7–14)
ANION GAP SERPL CALC-SCNC: 11 MMOL/L — SIGNIFICANT CHANGE UP (ref 7–14)
APPEARANCE UR: CLEAR — SIGNIFICANT CHANGE UP
AST SERPL-CCNC: 27 U/L — SIGNIFICANT CHANGE UP (ref 0–41)
BACTERIA # UR AUTO: ABNORMAL /HPF
BILIRUB SERPL-MCNC: 0.6 MG/DL — SIGNIFICANT CHANGE UP (ref 0.2–1.2)
BILIRUB UR-MCNC: NEGATIVE — SIGNIFICANT CHANGE UP
BUN SERPL-MCNC: 18 MG/DL — SIGNIFICANT CHANGE UP (ref 10–20)
BUN SERPL-MCNC: 19 MG/DL — SIGNIFICANT CHANGE UP (ref 10–20)
CALCIUM SERPL-MCNC: 8.8 MG/DL — SIGNIFICANT CHANGE UP (ref 8.4–10.5)
CALCIUM SERPL-MCNC: 9 MG/DL — SIGNIFICANT CHANGE UP (ref 8.4–10.5)
CAST: 1 /LPF — SIGNIFICANT CHANGE UP (ref 0–4)
CHLORIDE SERPL-SCNC: 88 MMOL/L — LOW (ref 98–110)
CHLORIDE SERPL-SCNC: 89 MMOL/L — LOW (ref 98–110)
CO2 SERPL-SCNC: 24 MMOL/L — SIGNIFICANT CHANGE UP (ref 17–32)
CO2 SERPL-SCNC: 25 MMOL/L — SIGNIFICANT CHANGE UP (ref 17–32)
COLOR SPEC: YELLOW — SIGNIFICANT CHANGE UP
CREAT ?TM UR-MCNC: 88 MG/DL — SIGNIFICANT CHANGE UP
CREAT SERPL-MCNC: 0.9 MG/DL — SIGNIFICANT CHANGE UP (ref 0.7–1.5)
CREAT SERPL-MCNC: 0.9 MG/DL — SIGNIFICANT CHANGE UP (ref 0.7–1.5)
DIFF PNL FLD: NEGATIVE — SIGNIFICANT CHANGE UP
EGFR: 68 ML/MIN/1.73M2 — SIGNIFICANT CHANGE UP
EGFR: 68 ML/MIN/1.73M2 — SIGNIFICANT CHANGE UP
EPI CELLS # UR: PRESENT
GLUCOSE SERPL-MCNC: 102 MG/DL — HIGH (ref 70–99)
GLUCOSE SERPL-MCNC: 107 MG/DL — HIGH (ref 70–99)
GLUCOSE UR QL: NEGATIVE MG/DL — SIGNIFICANT CHANGE UP
HCT VFR BLD CALC: 29.3 % — LOW (ref 37–47)
HGB BLD-MCNC: 10.5 G/DL — LOW (ref 12–16)
HYALINE CASTS # UR AUTO: 1 /LPF — SIGNIFICANT CHANGE UP (ref 0–4)
KETONES UR-MCNC: NEGATIVE MG/DL — SIGNIFICANT CHANGE UP
LEUKOCYTE ESTERASE UR-ACNC: ABNORMAL
MAGNESIUM SERPL-MCNC: 1.6 MG/DL — LOW (ref 1.8–2.4)
MCHC RBC-ENTMCNC: 34.5 PG — HIGH (ref 27–31)
MCHC RBC-ENTMCNC: 35.8 G/DL — SIGNIFICANT CHANGE UP (ref 32–37)
MCV RBC AUTO: 96.4 FL — SIGNIFICANT CHANGE UP (ref 81–99)
NITRITE UR-MCNC: NEGATIVE — SIGNIFICANT CHANGE UP
NRBC # BLD: 0 /100 WBCS — SIGNIFICANT CHANGE UP (ref 0–0)
OSMOLALITY UR: 485 MOS/KG — SIGNIFICANT CHANGE UP (ref 50–1200)
PH UR: 6.5 — SIGNIFICANT CHANGE UP (ref 5–8)
PLATELET # BLD AUTO: 193 K/UL — SIGNIFICANT CHANGE UP (ref 130–400)
PMV BLD: 10.2 FL — SIGNIFICANT CHANGE UP (ref 7.4–10.4)
POTASSIUM SERPL-MCNC: 3.6 MMOL/L — SIGNIFICANT CHANGE UP (ref 3.5–5)
POTASSIUM SERPL-MCNC: 3.7 MMOL/L — SIGNIFICANT CHANGE UP (ref 3.5–5)
POTASSIUM SERPL-SCNC: 3.6 MMOL/L — SIGNIFICANT CHANGE UP (ref 3.5–5)
POTASSIUM SERPL-SCNC: 3.7 MMOL/L — SIGNIFICANT CHANGE UP (ref 3.5–5)
PROT SERPL-MCNC: 6.5 G/DL — SIGNIFICANT CHANGE UP (ref 6–8)
PROT UR-MCNC: SIGNIFICANT CHANGE UP MG/DL
RBC # BLD: 3.04 M/UL — LOW (ref 4.2–5.4)
RBC # FLD: 12.3 % — SIGNIFICANT CHANGE UP (ref 11.5–14.5)
RBC CASTS # UR COMP ASSIST: 3 /HPF — SIGNIFICANT CHANGE UP (ref 0–4)
SODIUM SERPL-SCNC: 123 MMOL/L — LOW (ref 135–146)
SODIUM SERPL-SCNC: 124 MMOL/L — LOW (ref 135–146)
SODIUM UR-SCNC: 44 MMOL/L — SIGNIFICANT CHANGE UP
SP GR SPEC: 1.02 — SIGNIFICANT CHANGE UP (ref 1–1.03)
SQUAMOUS # UR AUTO: 6 /HPF — HIGH (ref 0–5)
TSH SERPL-MCNC: 2.56 UIU/ML — SIGNIFICANT CHANGE UP (ref 0.27–4.2)
URATE CRY FLD QL MICRO: PRESENT
UROBILINOGEN FLD QL: 1 MG/DL — SIGNIFICANT CHANGE UP (ref 0.2–1)
UUN UR-MCNC: 822 MG/DL — SIGNIFICANT CHANGE UP
WBC # BLD: 4.74 K/UL — LOW (ref 4.8–10.8)
WBC # FLD AUTO: 4.74 K/UL — LOW (ref 4.8–10.8)
WBC UR QL: 18 /HPF — HIGH (ref 0–5)

## 2024-05-16 PROCEDURE — 93306 TTE W/DOPPLER COMPLETE: CPT | Mod: 26

## 2024-05-16 PROCEDURE — 99232 SBSQ HOSP IP/OBS MODERATE 35: CPT

## 2024-05-16 RX ORDER — MAGNESIUM SULFATE 500 MG/ML
2 VIAL (ML) INJECTION ONCE
Refills: 0 | Status: COMPLETED | OUTPATIENT
Start: 2024-05-16 | End: 2024-05-16

## 2024-05-16 RX ORDER — CLOPIDOGREL BISULFATE 75 MG/1
1 TABLET, FILM COATED ORAL
Qty: 0 | Refills: 0 | DISCHARGE

## 2024-05-16 RX ORDER — SODIUM CHLORIDE 9 MG/ML
1000 INJECTION INTRAMUSCULAR; INTRAVENOUS; SUBCUTANEOUS
Refills: 0 | Status: DISCONTINUED | OUTPATIENT
Start: 2024-05-16 | End: 2024-05-17

## 2024-05-16 RX ORDER — SODIUM CHLORIDE 9 MG/ML
1000 INJECTION INTRAMUSCULAR; INTRAVENOUS; SUBCUTANEOUS
Refills: 0 | Status: DISCONTINUED | OUTPATIENT
Start: 2024-05-16 | End: 2024-05-16

## 2024-05-16 RX ORDER — ROPINIROLE 8 MG/1
1 TABLET, FILM COATED, EXTENDED RELEASE ORAL
Refills: 0 | DISCHARGE

## 2024-05-16 RX ORDER — APIXABAN 2.5 MG/1
1 TABLET, FILM COATED ORAL
Qty: 60 | Refills: 0
Start: 2024-05-16 | End: 2024-06-14

## 2024-05-16 RX ORDER — CHLORTHALIDONE 50 MG
1 TABLET ORAL
Refills: 0 | DISCHARGE

## 2024-05-16 RX ADMIN — PANTOPRAZOLE SODIUM 40 MILLIGRAM(S): 20 TABLET, DELAYED RELEASE ORAL at 06:09

## 2024-05-16 RX ADMIN — SACUBITRIL AND VALSARTAN 1 TABLET(S): 24; 26 TABLET, FILM COATED ORAL at 18:31

## 2024-05-16 RX ADMIN — SACUBITRIL AND VALSARTAN 1 TABLET(S): 24; 26 TABLET, FILM COATED ORAL at 06:09

## 2024-05-16 RX ADMIN — Medication 25 GRAM(S): at 13:11

## 2024-05-16 RX ADMIN — SODIUM CHLORIDE 70 MILLILITER(S): 9 INJECTION INTRAMUSCULAR; INTRAVENOUS; SUBCUTANEOUS at 18:32

## 2024-05-16 RX ADMIN — RANOLAZINE 1000 MILLIGRAM(S): 500 TABLET, FILM COATED, EXTENDED RELEASE ORAL at 18:31

## 2024-05-16 RX ADMIN — APIXABAN 5 MILLIGRAM(S): 2.5 TABLET, FILM COATED ORAL at 18:31

## 2024-05-16 RX ADMIN — Medication 100 MILLIGRAM(S): at 06:08

## 2024-05-16 RX ADMIN — Medication 975 MILLIGRAM(S): at 20:01

## 2024-05-16 RX ADMIN — Medication 81 MILLIGRAM(S): at 13:38

## 2024-05-16 RX ADMIN — APIXABAN 5 MILLIGRAM(S): 2.5 TABLET, FILM COATED ORAL at 08:51

## 2024-05-16 RX ADMIN — ISOSORBIDE MONONITRATE 90 MILLIGRAM(S): 60 TABLET, EXTENDED RELEASE ORAL at 13:39

## 2024-05-16 RX ADMIN — RANOLAZINE 1000 MILLIGRAM(S): 500 TABLET, FILM COATED, EXTENDED RELEASE ORAL at 06:09

## 2024-05-16 NOTE — DISCHARGE NOTE PROVIDER - HOSPITAL COURSE
74 y/o woman with PMH of CAD s/p CABG in 2011, chronic HFrEF (EF 41% in 2022), s/p AICD, HTN ?seizures, normocytic anemia, chronic hyponatremia and recent dx of restless leg syndrome presented for vomiting, dizziness and chest pain. She was found with paroxysmal Afib.    Near syncope possibly due to hypovolemia from vomiting - now resolved  Chest pain - now resolved - seemed atypical based on presentation (tender to palpation)  Paroxysmal Afib - new diagnosis- in NSR  Vomiting now resolved - ? due to adverse med effect  Acute over chronic hyponatremia - initially improved with hydration and now down to 124  CAD s/p CABG  chronic HFrEF  s/p AICD interrogation: negative for VT; couldn't assess atrial rhythm as AICD is single lead  hypomagnesemia repleted    cardio consult appreciated - started on Eliquis 5mg bid, stopped Plavix, EP consulted for MCOT on discharge  ECHO tody - report reviewed and EF remains at 41%, no significant new pathology  pt now eating and drinking - no further vomiting, chest pain  stop chlorthalidone on discharge  urine and serum osmolality and urine Na reviewed - pt is more euvolemic now - encourage solute intake and restrict free water intake 1L/day  negative orthostatics today      full code      Case discussed with son this morning and this afternoon - pt wants to go home ASAP  check BMP at 4PM -> if Na level 129 or higher, then pt may go home with outpt f/u with PMD, cardiology, EP  ideally pt should have MCOT placed prior to discharge but if pt does not want to wait for EP to place, then send info so it can be done as outpt  Eliquis script sent to [facility] - 1st month free and then pt needs prior authorization   74 y/o woman with PMH of CAD s/p CABG in 2011, chronic HFrEF (EF 41% in 2022), s/p AICD, HTN ?seizures, normocytic anemia, chronic hyponatremia and recent dx of restless leg syndrome presented for vomiting, dizziness and chest pain. She was found with paroxysmal Afib.    Near syncope possibly due to hypovolemia from vomiting - now resolved  Chest pain - now resolved - seemed atypical based on presentation (tender to palpation)  Paroxysmal Afib - new diagnosis- in NSR  Vomiting now resolved - ? due to adverse med effect  Acute over chronic hyponatremia - initially improved with hydration and now down to 124  CAD s/p CABG  chronic HFrEF  s/p AICD interrogation: negative for VT; couldn't assess atrial rhythm as AICD is single lead  hypomagnesemia repleted  EP placed MCOT     cardio consult appreciated - started on Eliquis 5mg bid, stopped Plavix, EP consulted for MCOT on discharge  ECHO tody - report reviewed and EF remains at 41%, no significant new pathology  pt now eating and drinking - no further vomiting, chest pain  stop chlorthalidone on discharge  urine and serum osmolality and urine Na reviewed - pt is more euvolemic now - encourage solute intake and restrict free water intake 1L/day  negative orthostatics today    Patient seen and examined, stable for discharge 74 y/o woman with PMH of CAD s/p CABG in 2011, chronic HFrEF (EF 41% in 2022), s/p AICD, HTN ?seizures, normocytic anemia, chronic hyponatremia and recent dx of restless leg syndrome presented for vomiting, dizziness and chest pain. She was found with paroxysmal Afib.    Near syncope possibly due to hypovolemia from vomiting - now resolved  Chest pain - now resolved - seemed atypical based on presentation (tender to palpation)  Paroxysmal Afib - new diagnosis- in NSR  Vomiting now resolved - ? due to adverse med effect from ropirinole  Acute over chronic hyponatremia - improved with hydration and now 128  CAD s/p CABG  chronic HFrEF  s/p AICD interrogation: negative for VT; couldn't assess atrial rhythm as AICD is single lead  hypomagnesemia repleted    cardio consult appreciated - started on Eliquis 5mg bid, stopped Plavix, EP consulted for MCOT on discharge (placed today)  ECHO report reviewed - EF41%, grade 2 diastolic dysfunction (same EF as previous ECHO)  TSH level - WNL  pt is now eating and drinking - no further vomiting, chest pain  stop chlorthalidone on discharge  urine and serum osmolality and urine Na reviewed - pt is euvolemic now - encourage solute intake and restrict free water intake 1L/day  negative orthostatics         Patient seen and examined, stable for discharge

## 2024-05-16 NOTE — PROGRESS NOTE ADULT - SUBJECTIVE AND OBJECTIVE BOX
ESTELITA RUFFIN  73y Female    INTERVAL HPI/OVERNIGHT EVENTS:    pt feels well  no dizziness, chest pain, SOB, N/V, abdominal pain  pt in NSR  son at bedside    T(F): 97.4 (05-16-24 @ 07:26), Max: 97.6 (05-15-24 @ 16:00)  HR: 53 (05-16-24 @ 07:26) (53 - 71)  BP: 101/64 (05-16-24 @ 07:26) (101/64 - 125/66)  RR: 18 (05-16-24 @ 07:26) (18 - 18)  SpO2: 94% (05-16-24 @ 07:26) (94% - 97%) on RA      PHYSICAL EXAM:  GENERAL: NAD  HEAD:  Normocephalic  EYES:  conjunctiva and sclera clear  ENMT: Moist mucous membranes  NECK: Supple, No JVD  NERVOUS SYSTEM:  Alert, awake, Good concentration  CHEST/LUNG: CTA b/l; No rales, rhonchi, wheezing  HEART: Regular rate and rhythm; No murmurs  ABDOMEN: Soft, Nontender, Nondistended; Bowel sounds present  EXTREMITIES: No edema  SKIN:     Consultant(s) Notes Reviewed:  [x ] YES  [ ] NO  Care Discussed with Consultants/Other Providers [ x] YES  [ ] NO    MEDICATIONS  (STANDING):  apixaban 5 milliGRAM(s) Oral every 12 hours  aspirin enteric coated 81 milliGRAM(s) Oral daily  isosorbide   mononitrate ER Tablet (IMDUR) 90 milliGRAM(s) Oral daily  metoprolol succinate  milliGRAM(s) Oral daily  pantoprazole    Tablet 40 milliGRAM(s) Oral before breakfast  ranolazine 1000 milliGRAM(s) Oral two times a day  sacubitril 24 mG/valsartan 26 mG 1 Tablet(s) Oral two times a day    MEDICATIONS  (PRN):  acetaminophen     Tablet .. 975 milliGRAM(s) Oral every 8 hours PRN Mild Pain (1 - 3)      Telemetry reviewed by me    LABS:                        10.5   4.74  )-----------( 193      ( 16 May 2024 08:11 )             29.3     05-16    124<L>  |  89<L>  |  19  ----------------------------<  102<H>  3.6   |  24  |  0.9    Ca    9.0      16 May 2024 08:11  Mg     1.6     05-16    TPro  6.5  /  Alb  4.3  /  TBili  0.6  /  DBili  x   /  AST  27  /  ALT  20  /  AlkPhos  34  05-16            Lactate, Blood: 0.7 mmol/L (05-15 @ 10:05)        RADIOLOGY & ADDITIONAL TESTS:    Imaging or report Personally Reviewed:  [ ] YES  [ ] NO        Case discussed with residents and RN on rounds today    Care discussed with pt/family           ESTELITA RUFFIN  73y Female    INTERVAL HPI/OVERNIGHT EVENTS:    pt feels well  no dizziness, chest pain, SOB, N/V, abdominal pain  pt in NSR  son at bedside    T(F): 97.4 (05-16-24 @ 07:26), Max: 97.6 (05-15-24 @ 16:00)  HR: 53 (05-16-24 @ 07:26) (53 - 71)  BP: 101/64 (05-16-24 @ 07:26) (101/64 - 125/66)  RR: 18 (05-16-24 @ 07:26) (18 - 18)  SpO2: 94% (05-16-24 @ 07:26) (94% - 97%) on RA      PHYSICAL EXAM:  GENERAL: NAD  HEAD:  Normocephalic  EYES:  conjunctiva and sclera clear  ENMT: Moist mucous membranes  NECK: Supple  NERVOUS SYSTEM:  Alert, awake, Good concentration  CHEST/LUNG: CTA b/l  HEART: Regular rate and rhythm  ABDOMEN: Soft, Nontender, Nondistended; Bowel sounds present  EXTREMITIES: No edema LE  SKIN: warm, dry    Consultant(s) Notes Reviewed:  [x ] YES  [ ] NO  Care Discussed with Consultants/Other Providers [ x] YES  [ ] NO    MEDICATIONS  (STANDING):  apixaban 5 milliGRAM(s) Oral every 12 hours  aspirin enteric coated 81 milliGRAM(s) Oral daily  isosorbide   mononitrate ER Tablet (IMDUR) 90 milliGRAM(s) Oral daily  metoprolol succinate  milliGRAM(s) Oral daily  pantoprazole    Tablet 40 milliGRAM(s) Oral before breakfast  ranolazine 1000 milliGRAM(s) Oral two times a day  sacubitril 24 mG/valsartan 26 mG 1 Tablet(s) Oral two times a day    MEDICATIONS  (PRN):  acetaminophen     Tablet .. 975 milliGRAM(s) Oral every 8 hours PRN Mild Pain (1 - 3)      Telemetry reviewed by me    LABS:                        10.5   4.74  )-----------( 193      ( 16 May 2024 08:11 )             29.3     05-16    124<L>  |  89<L>  |  19  ----------------------------<  102<H>  3.6   |  24  |  0.9    Ca    9.0      16 May 2024 08:11  Mg     1.6     05-16    TPro  6.5  /  Alb  4.3  /  TBili  0.6  /  DBili  x   /  AST  27  /  ALT  20  /  AlkPhos  34  05-16            Lactate, Blood: 0.7 mmol/L (05-15 @ 10:05)        RADIOLOGY & ADDITIONAL TESTS:    Imaging or report Personally Reviewed:  [x ] YES  [ ] NO    < from: CT Head No Cont (05.15.24 @ 10:33) >  IMPRESSION:  No acute intracranial pathology. No evidence of midline shift, mass   effect or intracranial hemorrhage.    < end of copied text >      < from: Xray Chest 1 View- PORTABLE-Urgent (05.15.24 @ 10:04) >  Impression:      Low lung volume without eagle consolidation, effusion, or pneumothorax.    < end of copied text >      < from: TTE Echo Complete w/o Contrast w/ Doppler (08.01.22 @ 09:03) >  Summary:   1. Moderately decreased global left ventricular systolic function.   2. Multiple left ventricular regional wall motion abnormalities exist.   See wall motion findings.   3. LV Ejection Fraction by Kong's Method with a biplane EF of 41 %.   4. Elevated left atrial and left ventricular end-diastolic pressures.   5. Normal left ventricular internal cavity size.   6. Spectral Doppler shows pseudonormal pattern of left ventricular   myocardial filling (Grade II diastolic dysfunction).   7. No evidence of intracardiac mass or thrombus with Lumason injection.   8. Mild mitral valve regurgitation.   9. Mild tricuspid regurgitation.  10. Color flow doppler and intravenous injection of agitated saline   demonstrates the presenceof an intact intra atrial septum.  11. Endocardial visualization was enhanced with intravenous echo contrast.    < end of copied text >          Case discussed with residents on rounds today    Care discussed with pt's family

## 2024-05-16 NOTE — DISCHARGE NOTE PROVIDER - NSDCMRMEDTOKEN_GEN_ALL_CORE_FT
Aspir 81 oral delayed release tablet: 1 tab(s) orally once a day  calcium + Vitamin D: once daily  Eliquis 5 mg oral tablet: 1 tab(s) orally every 12 hours  Entresto 24 mg-26 mg oral tablet: 1 tab(s) orally 2 times a day  famotidine 20 mg oral tablet: 1 tab(s) orally once a day  isosorbide mononitrate 30 mg oral tablet, extended release: 3 tab(s) orally once a day   metoprolol succinate 100 mg oral tablet, extended release: 1 tab(s) orally once a day  Ranexa 1000 mg oral tablet, extended release: 1 tab(s) orally 2 times a day  spironolactone 25 mg oral tablet: 1 tab(s) orally once a day

## 2024-05-16 NOTE — DISCHARGE NOTE PROVIDER - CARE PROVIDER_API CALL
[facility] Medicine  [address] [facility] - [address]  [place] [address]  Phone: [phone]  Fax: [phone]  Established Patient  Follow Up Time: 2 weeks    Yaakov Scott  Cardiac Electrophysiology  [address] [place] [address], [address]  [place] [address]  Phone: [phone]  Fax: [phone]  Follow Up Time: 1 week

## 2024-05-16 NOTE — DISCHARGE NOTE PROVIDER - PROVIDER TOKENS
PROVIDER:[TOKEN:[[id number]],FOLLOWUP:[2 weeks],ESTABLISHEDPATIENT:[T]],PROVIDER:[TOKEN:[[id number]],FOLLOWUP:[1 week]]

## 2024-05-16 NOTE — DISCHARGE NOTE PROVIDER - NSDCCPCAREPLAN_GEN_ALL_CORE_FT
PRINCIPAL DISCHARGE DIAGNOSIS  Diagnosis: Chest pain  Assessment and Plan of Treatment: you presented with vomiting multiple times which may have been due to ropinirole (side effects: nausea, vomiting, dizziness).   Stop this medication and follow up with neurology for alternative.  You had chest pain which resolved and cardiology recommends you to continue the medications listed on the sheet and follow up as outpatient.   You were found with atrial fibrillation (arrhythmia). Take apixaban 5mg twice a day and follow up with cardiology and electrophysiology. You need external monitor as outpatient.  Stop Plavix   Stop chlorthalidone.      SECONDARY DISCHARGE DIAGNOSES  Diagnosis: Dizziness  Assessment and Plan of Treatment: now resolved    Diagnosis: Hyponatremia  Assessment and Plan of Treatment: likely due to excessive vomiting and loss of volume  stop chlorthalidone     PRINCIPAL DISCHARGE DIAGNOSIS  Diagnosis: Chest pain  Assessment and Plan of Treatment: you presented with vomiting multiple times which may have been due to ropinirole (side effects: nausea, vomiting, dizziness).   Stop this medication and follow up with neurology for alternative.  You had chest pain which resolved and cardiology recommends you to continue the medications listed on the sheet and follow up as outpatient.   You were found with atrial fibrillation (arrhythmia). Take apixaban 5mg twice a day and follow up with cardiology and electrophysiology.   Stop Plavix   Stop chlorthalidone.      SECONDARY DISCHARGE DIAGNOSES  Diagnosis: Dizziness  Assessment and Plan of Treatment: now resolved    Diagnosis: Hyponatremia  Assessment and Plan of Treatment: likely due to excessive vomiting and loss of volume  stop chlorthalidone

## 2024-05-16 NOTE — PATIENT PROFILE ADULT - FALL HARM RISK - HARM RISK INTERVENTIONS

## 2024-05-16 NOTE — DISCHARGE NOTE PROVIDER - CARE PROVIDERS DIRECT ADDRESSES
[email or web address]

## 2024-05-16 NOTE — DISCHARGE NOTE PROVIDER - NSDCFUSCHEDAPPT_GEN_ALL_CORE_FT
Villa Bolton  [facility]  CARDIOLOGY [address]  Scheduled Appointment: 06/14/2024    [facility]  CARDIOLOGY [address]  Scheduled Appointment: 07/18/2024

## 2024-05-17 VITALS — HEART RATE: 58 BPM | DIASTOLIC BLOOD PRESSURE: 63 MMHG | SYSTOLIC BLOOD PRESSURE: 103 MMHG

## 2024-05-17 LAB
ANION GAP SERPL CALC-SCNC: 12 MMOL/L — SIGNIFICANT CHANGE UP (ref 7–14)
ANION GAP SERPL CALC-SCNC: 9 MMOL/L — SIGNIFICANT CHANGE UP (ref 7–14)
APPEARANCE UR: CLEAR — SIGNIFICANT CHANGE UP
BACTERIA # UR AUTO: NEGATIVE /HPF — SIGNIFICANT CHANGE UP
BILIRUB UR-MCNC: NEGATIVE — SIGNIFICANT CHANGE UP
BUN SERPL-MCNC: 18 MG/DL — SIGNIFICANT CHANGE UP (ref 10–20)
BUN SERPL-MCNC: 19 MG/DL — SIGNIFICANT CHANGE UP (ref 10–20)
CALCIUM SERPL-MCNC: 8.4 MG/DL — SIGNIFICANT CHANGE UP (ref 8.4–10.4)
CALCIUM SERPL-MCNC: 8.8 MG/DL — SIGNIFICANT CHANGE UP (ref 8.4–10.5)
CAST: 0 /LPF — SIGNIFICANT CHANGE UP (ref 0–4)
CHLORIDE SERPL-SCNC: 88 MMOL/L — LOW (ref 98–110)
CHLORIDE SERPL-SCNC: 92 MMOL/L — LOW (ref 98–110)
CO2 SERPL-SCNC: 24 MMOL/L — SIGNIFICANT CHANGE UP (ref 17–32)
CO2 SERPL-SCNC: 27 MMOL/L — SIGNIFICANT CHANGE UP (ref 17–32)
COLOR SPEC: YELLOW — SIGNIFICANT CHANGE UP
CREAT SERPL-MCNC: 0.9 MG/DL — SIGNIFICANT CHANGE UP (ref 0.7–1.5)
CREAT SERPL-MCNC: 1.1 MG/DL — SIGNIFICANT CHANGE UP (ref 0.7–1.5)
DIFF PNL FLD: NEGATIVE — SIGNIFICANT CHANGE UP
EGFR: 53 ML/MIN/1.73M2 — LOW
EGFR: 68 ML/MIN/1.73M2 — SIGNIFICANT CHANGE UP
GLUCOSE SERPL-MCNC: 100 MG/DL — HIGH (ref 70–99)
GLUCOSE SERPL-MCNC: 96 MG/DL — SIGNIFICANT CHANGE UP (ref 70–99)
GLUCOSE UR QL: NEGATIVE MG/DL — SIGNIFICANT CHANGE UP
HCT VFR BLD CALC: 30.6 % — LOW (ref 37–47)
HGB BLD-MCNC: 10.4 G/DL — LOW (ref 12–16)
KETONES UR-MCNC: NEGATIVE MG/DL — SIGNIFICANT CHANGE UP
LEUKOCYTE ESTERASE UR-ACNC: ABNORMAL
MAGNESIUM SERPL-MCNC: 1.6 MG/DL — LOW (ref 1.8–2.4)
MCHC RBC-ENTMCNC: 34 G/DL — SIGNIFICANT CHANGE UP (ref 32–37)
MCHC RBC-ENTMCNC: 34 PG — HIGH (ref 27–31)
MCV RBC AUTO: 100 FL — HIGH (ref 81–99)
NITRITE UR-MCNC: NEGATIVE — SIGNIFICANT CHANGE UP
NRBC # BLD: 0 /100 WBCS — SIGNIFICANT CHANGE UP (ref 0–0)
OSMOLALITY UR: 265 MOS/KG — SIGNIFICANT CHANGE UP (ref 50–1200)
PH UR: 6 — SIGNIFICANT CHANGE UP (ref 5–8)
PLATELET # BLD AUTO: 191 K/UL — SIGNIFICANT CHANGE UP (ref 130–400)
PMV BLD: 10.4 FL — SIGNIFICANT CHANGE UP (ref 7.4–10.4)
POTASSIUM SERPL-MCNC: 3.8 MMOL/L — SIGNIFICANT CHANGE UP (ref 3.5–5)
POTASSIUM SERPL-MCNC: 4.2 MMOL/L — SIGNIFICANT CHANGE UP (ref 3.5–5)
POTASSIUM SERPL-SCNC: 3.8 MMOL/L — SIGNIFICANT CHANGE UP (ref 3.5–5)
POTASSIUM SERPL-SCNC: 4.2 MMOL/L — SIGNIFICANT CHANGE UP (ref 3.5–5)
PROT UR-MCNC: NEGATIVE MG/DL — SIGNIFICANT CHANGE UP
RBC # BLD: 3.06 M/UL — LOW (ref 4.2–5.4)
RBC # FLD: 12.4 % — SIGNIFICANT CHANGE UP (ref 11.5–14.5)
RBC CASTS # UR COMP ASSIST: 1 /HPF — SIGNIFICANT CHANGE UP (ref 0–4)
SODIUM SERPL-SCNC: 124 MMOL/L — LOW (ref 135–146)
SODIUM SERPL-SCNC: 128 MMOL/L — LOW (ref 135–146)
SP GR SPEC: 1.01 — SIGNIFICANT CHANGE UP (ref 1–1.03)
SQUAMOUS # UR AUTO: 0 /HPF — SIGNIFICANT CHANGE UP (ref 0–5)
UROBILINOGEN FLD QL: 0.2 MG/DL — SIGNIFICANT CHANGE UP (ref 0.2–1)
WBC # BLD: 5.6 K/UL — SIGNIFICANT CHANGE UP (ref 4.8–10.8)
WBC # FLD AUTO: 5.6 K/UL — SIGNIFICANT CHANGE UP (ref 4.8–10.8)
WBC UR QL: 1 /HPF — SIGNIFICANT CHANGE UP (ref 0–5)

## 2024-05-17 PROCEDURE — 99239 HOSP IP/OBS DSCHRG MGMT >30: CPT

## 2024-05-17 RX ORDER — SODIUM CHLORIDE 9 MG/ML
1000 INJECTION INTRAMUSCULAR; INTRAVENOUS; SUBCUTANEOUS
Refills: 0 | Status: DISCONTINUED | OUTPATIENT
Start: 2024-05-17 | End: 2024-05-17

## 2024-05-17 RX ORDER — MAGNESIUM SULFATE 500 MG/ML
2 VIAL (ML) INJECTION ONCE
Refills: 0 | Status: DISCONTINUED | OUTPATIENT
Start: 2024-05-17 | End: 2024-05-17

## 2024-05-17 RX ORDER — MAGNESIUM SULFATE 500 MG/ML
2 VIAL (ML) INJECTION ONCE
Refills: 0 | Status: COMPLETED | OUTPATIENT
Start: 2024-05-17 | End: 2024-05-17

## 2024-05-17 RX ADMIN — RANOLAZINE 1000 MILLIGRAM(S): 500 TABLET, FILM COATED, EXTENDED RELEASE ORAL at 05:06

## 2024-05-17 RX ADMIN — PANTOPRAZOLE SODIUM 40 MILLIGRAM(S): 20 TABLET, DELAYED RELEASE ORAL at 05:06

## 2024-05-17 RX ADMIN — ISOSORBIDE MONONITRATE 90 MILLIGRAM(S): 60 TABLET, EXTENDED RELEASE ORAL at 11:49

## 2024-05-17 RX ADMIN — Medication 975 MILLIGRAM(S): at 05:06

## 2024-05-17 RX ADMIN — Medication 100 MILLIGRAM(S): at 05:06

## 2024-05-17 RX ADMIN — Medication 25 GRAM(S): at 10:16

## 2024-05-17 RX ADMIN — APIXABAN 5 MILLIGRAM(S): 2.5 TABLET, FILM COATED ORAL at 05:06

## 2024-05-17 RX ADMIN — Medication 81 MILLIGRAM(S): at 11:49

## 2024-05-17 RX ADMIN — SODIUM CHLORIDE 70 MILLILITER(S): 9 INJECTION INTRAMUSCULAR; INTRAVENOUS; SUBCUTANEOUS at 10:16

## 2024-05-17 RX ADMIN — SACUBITRIL AND VALSARTAN 1 TABLET(S): 24; 26 TABLET, FILM COATED ORAL at 05:06

## 2024-05-17 NOTE — CHART NOTE - NSCHARTNOTEFT_GEN_A_CORE
[employer] single chamber ICD interrogated  Implant date Sept 8, 2016  Follows with Dr. Scott  Battery life 2.2 years   < 1%  Mode: VVI 40 BPM    No events. No VT
IVONE put on pt.  Instructions provided to pt's son.   Pt can follow up with Dr. Scott in 5-6 weeks
On Call Updates:   - Followed up 4PM Na >> 123  - DC held  - Start IV NS @ 70cc/hr  - Repeat BMP at 11PM and adjust rate accordingly  - Monitor for fluid overload  - NO excess free water intake

## 2024-05-17 NOTE — PROGRESS NOTE ADULT - SUBJECTIVE AND OBJECTIVE BOX
ESTELITA RUFFIN  73y Female    INTERVAL HPI/OVERNIGHT EVENTS:    pt feels well and is comfortable in going home today  no complaints    T(F): 97.9 (05-17-24 @ 04:54), Max: 98.4 (05-16-24 @ 20:15)  HR: 58 (05-17-24 @ 11:52) (56 - 69)  BP: 103/63 (05-17-24 @ 11:52) (95/52 - 126/73)  RR: 18 (05-17-24 @ 04:54) (18 - 18)  SpO2: 95% (05-17-24 @ 04:54) (95% - 99%)    I&O's Summary    16 May 2024 07:01  -  17 May 2024 07:00  --------------------------------------------------------  IN: 1020 mL / OUT: 850 mL / NET: 170 mL    PHYSICAL EXAM:  GENERAL: NAD  HEAD:  Normocephalic  EYES:  conjunctiva and sclera clear  ENMT: Moist mucous membranes  NECK: Supple  NERVOUS SYSTEM:  Alert, awake, Good concentration  CHEST/LUNG: CTA b/l  HEART: Regular rate and rhythm  ABDOMEN: Soft, Nontender, Nondistended  EXTREMITIES: mild LE edema  SKIN: warm, dry    Consultant(s) Notes Reviewed:  [x ] YES  [ ] NO  Care Discussed with Consultants/Other Providers [ x] YES  [ ] NO    MEDICATIONS  (STANDING):  apixaban 5 milliGRAM(s) Oral every 12 hours  aspirin enteric coated 81 milliGRAM(s) Oral daily  isosorbide   mononitrate ER Tablet (IMDUR) 90 milliGRAM(s) Oral daily  metoprolol succinate  milliGRAM(s) Oral daily  pantoprazole    Tablet 40 milliGRAM(s) Oral before breakfast  ranolazine 1000 milliGRAM(s) Oral two times a day  sacubitril 24 mG/valsartan 26 mG 1 Tablet(s) Oral two times a day  sodium chloride 0.9%. 1000 milliLiter(s) (70 mL/Hr) IV Continuous <Continuous>    MEDICATIONS  (PRN):  acetaminophen     Tablet .. 975 milliGRAM(s) Oral every 8 hours PRN Mild Pain (1 - 3)      Telemetry reviewed by me    LABS:                        10.4   5.60  )-----------( 191      ( 17 May 2024 06:16 )             30.6     05-17    128<L>  |  92<L>  |  19  ----------------------------<  100<H>  4.2   |  27  |  0.9    Ca    8.8      17 May 2024 06:16  Mg     1.6     05-17    TPro  6.5  /  Alb  4.3  /  TBili  0.6  /  DBili  x   /  AST  27  /  ALT  20  /  AlkPhos  34  05-16            Lactate, Blood: 0.7 mmol/L (05-15 @ 10:05)      < from: TTE Echo Complete w/o Contrast w/ Doppler (05.16.24 @ 09:59) >  Summary:   1. Moderately decreasedglobal left ventricular systolic function.   2. LV Ejection Fraction by Kong's Method with a biplane EF of 41 %.   3. Elevated mean left atrial pressure.   4. Spectral Doppler shows pseudonormal pattern of left ventricular   myocardial filling (Grade II diastolic dysfunction).   5. Moderately enlarged left atrium.   6. Normal right atrial size.   7. Mild to moderate mitral valve regurgitation.   8. Moderate tricuspid regurgitation.   9. Sclerotic aortic valve with normal opening.    < end of copied text >          Case discussed with resident today    Care discussed with pt    Case discussed in detail with pt's son yesterday

## 2024-05-17 NOTE — PROGRESS NOTE ADULT - ASSESSMENT
72 y/o woman with PMH of CAD s/p CABG in 2011, chronic HFrEF (EF 41% in 2022), s/p AICD, HTN ?seizures, normocytic anemia, chronic hyponatremia and recent dx of restless leg syndrome presented for vomiting, dizziness and chest pain. She was found with paroxysmal Afib.    Near syncope possibly due to hypovolemia from vomiting - now resolved  Chest pain - now resolved - seemed atypical based on presentation (tender to palpation)  Paroxysmal Afib - new diagnosis- in NSR  Vomiting now resolved - ? due to adverse med effect from ropirinole  Acute over chronic hyponatremia - improved with hydration and now 128  CAD s/p CABG  chronic HFrEF  s/p AICD interrogation: negative for VT; couldn't assess atrial rhythm as AICD is single lead  hypomagnesemia repleted    cardio consult appreciated - started on Eliquis 5mg bid, stopped Plavix, EP consulted for MCOT on discharge (placed today)  ECHO report reviewed - EF41%, grade 2 diastolic dysfunction (same EF as previous ECHO)  TSH level - WNL  pt is now eating and drinking - no further vomiting, chest pain  stop chlorthalidone on discharge  urine and serum osmolality and urine Na reviewed - pt is euvolemic now - encourage solute intake and restrict free water intake 1L/day  negative orthostatics       pt will f/u with PMD, cardiology Dr. Bolton and EP as outpt  med rec and discharge papers reviewed by me  spent 50 minutes on the discharge process of this pt
72 y/o woman with PMH of CAD s/p CABG in 2011, chronic HFrEF (EF 41% in 2022), s/p AICD, HTN ?seizures, normocytic anemia, chronic hyponatremia and recent dx of restless leg syndrome presented for vomiting, dizziness and chest pain. She was found with paroxysmal Afib.    Near syncope possibly due to hypovolemia from vomiting - now resolved  Chest pain - now resolved - seemed atypical based on presentation (tender to palpation)  Paroxysmal Afib - new diagnosis- in NSR  Vomiting now resolved - ? due to adverse med effect  Acute over chronic hyponatremia - initially improved with hydration and now down to 124  CAD s/p CABG  chronic HFrEF  s/p AICD interrogation: negative for VT; couldn't assess atrial rhythm as AICD is single lead  hypomagnesemia repleted    cardio consult appreciated - started on Eliquis 5mg bid, stopped Plavix, EP consulted for MCOT on discharge  ECHO today  check TSH level  pt now eating and drinking - no further vomiting, chest pain  stop chlorthalidone on discharge  urine and serum osmolality and urine Na reviewed - pt is more euvolemic now - encourage solute intake and restrict free water intake 1L/day  negative orthostatics today      full code  guarded prognosis      PROGRESS NOTE HANDOFF    Pending: BMP later today and in AM    Family discussion: with son at bedside today    Disposition: after review of ECHO, TSH level and improvement in Na level

## 2024-05-23 DIAGNOSIS — E87.1 HYPO-OSMOLALITY AND HYPONATREMIA: ICD-10-CM

## 2024-05-23 DIAGNOSIS — Z95.810 PRESENCE OF AUTOMATIC (IMPLANTABLE) CARDIAC DEFIBRILLATOR: ICD-10-CM

## 2024-05-23 DIAGNOSIS — I25.10 ATHEROSCLEROTIC HEART DISEASE OF NATIVE CORONARY ARTERY WITHOUT ANGINA PECTORIS: ICD-10-CM

## 2024-05-23 DIAGNOSIS — R07.9 CHEST PAIN, UNSPECIFIED: ICD-10-CM

## 2024-05-23 DIAGNOSIS — I50.22 CHRONIC SYSTOLIC (CONGESTIVE) HEART FAILURE: ICD-10-CM

## 2024-05-23 DIAGNOSIS — I11.0 HYPERTENSIVE HEART DISEASE WITH HEART FAILURE: ICD-10-CM

## 2024-05-23 DIAGNOSIS — I48.0 PAROXYSMAL ATRIAL FIBRILLATION: ICD-10-CM

## 2024-05-23 DIAGNOSIS — D64.9 ANEMIA, UNSPECIFIED: ICD-10-CM

## 2024-05-26 NOTE — PROCEDURE
[See Scanned Paceart Report] : See scanned paceart report [NSR] : normal sinus rhythm [ICD] : Implantable cardioverter-defibrillator [VVI] : VVI [Impedance: ___ Ohms] : current cell impedance is [unfilled] Ohms [Charge Time: ___ sec] : charge time was [unfilled] seconds [Lead Imp:  ___ohms] : lead impedance was [unfilled] ohms [Sensing Amplitude ___mv] : sensing amplitude was [unfilled] mv [___V @] : [unfilled] V [___ ms] : [unfilled] ms [None] : none [Programmed for Longevity] : output reprogrammed for improved battery longevity [de-identified] : sT Garrett [de-identified] : MARSHAL vR 1411-7 [de-identified] : [id number] [de-identified] : 9/8/2016

## 2024-05-26 NOTE — PROCEDURE
[See Scanned Paceart Report] : See scanned paceart report [NSR] : normal sinus rhythm [ICD] : Implantable cardioverter-defibrillator [VVI] : VVI [Impedance: ___ Ohms] : current cell impedance is [unfilled] Ohms [Charge Time: ___ sec] : charge time was [unfilled] seconds [Lead Imp:  ___ohms] : lead impedance was [unfilled] ohms [Sensing Amplitude ___mv] : sensing amplitude was [unfilled] mv [___V @] : [unfilled] V [___ ms] : [unfilled] ms [None] : none [Programmed for Longevity] : output reprogrammed for improved battery longevity [de-identified] : sT Garrett [de-identified] : MARSHAL vR 1411-7 [de-identified] : [id number] [de-identified] : 9/8/2016

## 2024-06-14 ENCOUNTER — APPOINTMENT (OUTPATIENT)
Dept: CARDIOLOGY | Facility: CLINIC | Age: 74
End: 2024-06-14
Payer: MEDICAID

## 2024-06-14 VITALS
DIASTOLIC BLOOD PRESSURE: 62 MMHG | SYSTOLIC BLOOD PRESSURE: 104 MMHG | WEIGHT: 185 LBS | BODY MASS INDEX: 34.04 KG/M2 | HEART RATE: 61 BPM | HEIGHT: 62 IN

## 2024-06-14 DIAGNOSIS — I10 ESSENTIAL (PRIMARY) HYPERTENSION: ICD-10-CM

## 2024-06-14 DIAGNOSIS — I50.22 CHRONIC SYSTOLIC (CONGESTIVE) HEART FAILURE: ICD-10-CM

## 2024-06-14 DIAGNOSIS — I25.10 ATHEROSCLEROTIC HEART DISEASE OF NATIVE CORONARY ARTERY W/OUT ANGINA PECTORIS: ICD-10-CM

## 2024-06-14 PROCEDURE — 99214 OFFICE O/P EST MOD 30 MIN: CPT

## 2024-06-14 PROCEDURE — 93000 ELECTROCARDIOGRAM COMPLETE: CPT

## 2024-06-14 RX ORDER — CLOPIDOGREL BISULFATE 75 MG/1
75 TABLET, FILM COATED ORAL
Qty: 30 | Refills: 6 | Status: DISCONTINUED | COMMUNITY
Start: 2019-11-25 | End: 2024-06-14

## 2024-06-14 RX ORDER — APIXABAN 5 MG/1
5 TABLET, FILM COATED ORAL
Qty: 120 | Refills: 2 | Status: ACTIVE | COMMUNITY
Start: 1900-01-01 | End: 1900-01-01

## 2024-06-14 RX ORDER — ROPINIROLE 0.25 MG/1
0.25 TABLET, FILM COATED ORAL AT BEDTIME
Qty: 30 | Refills: 2 | Status: DISCONTINUED | COMMUNITY
Start: 2024-03-12 | End: 2024-06-14

## 2024-06-15 LAB
ALBUMIN SERPL ELPH-MCNC: 4.6 G/DL
ALP BLD-CCNC: 42 U/L
ALT SERPL-CCNC: 18 U/L
ANION GAP SERPL CALC-SCNC: 14 MMOL/L
AST SERPL-CCNC: 21 U/L
BILIRUB SERPL-MCNC: 0.8 MG/DL
BUN SERPL-MCNC: 14 MG/DL
CALCIUM SERPL-MCNC: 9.6 MG/DL
CHLORIDE SERPL-SCNC: 94 MMOL/L
CO2 SERPL-SCNC: 24 MMOL/L
CREAT SERPL-MCNC: 0.9 MG/DL
EGFR: 68 ML/MIN/1.73M2
GLUCOSE SERPL-MCNC: 113 MG/DL
POTASSIUM SERPL-SCNC: 4.4 MMOL/L
PROT SERPL-MCNC: 7 G/DL
SODIUM SERPL-SCNC: 132 MMOL/L

## 2024-06-15 RX ORDER — CHLORTHALIDONE 25 MG/1
25 TABLET ORAL
Qty: 90 | Refills: 2 | Status: DISCONTINUED | COMMUNITY
Start: 2018-05-30 | End: 2024-06-15

## 2024-06-15 NOTE — PHYSICAL EXAM
[Well Developed] : well developed [Well Nourished] : well nourished [No Acute Distress] : no acute distress [Normal Conjunctiva] : normal conjunctiva [Normal Venous Pressure] : normal venous pressure [No Carotid Bruit] : no carotid bruit [Normal S1, S2] : normal S1, S2 [No Murmur] : no murmur [No Rub] : no rub [No Gallop] : no gallop [Clear Lung Fields] : clear lung fields [Good Air Entry] : good air entry [No Respiratory Distress] : no respiratory distress  [Soft] : abdomen soft [Non Tender] : non-tender [No Masses/organomegaly] : no masses/organomegaly [Normal Bowel Sounds] : normal bowel sounds [Normal Gait] : normal gait [No Edema] : no edema [No Cyanosis] : no cyanosis [No Clubbing] : no clubbing [No Varicosities] : no varicosities [No Rash] : no rash [No Skin Lesions] : no skin lesions [Moves all extremities] : moves all extremities [No Focal Deficits] : no focal deficits [Normal Speech] : normal speech [Alert and Oriented] : alert and oriented [Normal memory] : normal memory [de-identified] : well appearing, no distress [de-identified] : alert, normal affect, logicalconversation

## 2024-06-15 NOTE — ASSESSMENT
[FreeTextEntry1] : CAD s/p CABG Angina controlled. Poor revascularization targets.  ICM s/p AICD Severe LV Dysfunction (improved with revascularization and medication)  Chronic Systolic CHF Compensated.  BP controlled.  Hyponatremia likely multifactorial (hypovolemia / SIADH in setting of vomiting / thiazide) Mild chronic component.

## 2024-06-15 NOTE — DISCUSSION/SUMMARY
[FreeTextEntry1] : Cont ASA / Eliquis Cont Lipitor Cont Entresto, Toprol, Spironolactone Cont Imdur / Ranexa Remain off Chlorthalidone Cardiac rehab Follow-up 3 months [EKG obtained to assist in diagnosis and management of assessed problem(s)] : EKG obtained to assist in diagnosis and management of assessed problem(s)

## 2024-06-15 NOTE — HISTORY OF PRESENT ILLNESS
[FreeTextEntry1] : Seen with British speaking NP, who facilitated conversation.  Interval hospitalization.  Nausea / vomiting (possibly related to ropirinole) complicated by new onset parox AF and hypovolemic hyponatremia.   Anticoagulation initiated.  Hyponatremia improved with volume resuscitation.  Chlorthalidone and Plavix discontinued.  SR today.  Recovered, but feels weak / deconditioned.  No angina.  Breathing relatively comfortable.  No palpitations.  Tolerating Rx.

## 2024-06-17 LAB
HCT VFR BLD CALC: 34.3 %
HGB BLD-MCNC: 11.9 G/DL
MCHC RBC-ENTMCNC: 34.7 G/DL
MCHC RBC-ENTMCNC: 34.7 PG
MCV RBC AUTO: 100 FL
PLATELET # BLD AUTO: 250 K/UL
PMV BLD AUTO: 0 /100 WBCS
PMV BLD: 10.1 FL
RBC # BLD: 3.43 M/UL
RBC # FLD: 13.2 %
WBC # FLD AUTO: 3.6 K/UL

## 2024-06-20 ENCOUNTER — APPOINTMENT (OUTPATIENT)
Dept: ELECTROPHYSIOLOGY | Facility: CLINIC | Age: 74
End: 2024-06-20

## 2024-06-20 VITALS
HEART RATE: 61 BPM | HEIGHT: 62 IN | DIASTOLIC BLOOD PRESSURE: 60 MMHG | SYSTOLIC BLOOD PRESSURE: 108 MMHG | WEIGHT: 178 LBS | BODY MASS INDEX: 32.76 KG/M2

## 2024-06-20 DIAGNOSIS — I25.5 ISCHEMIC CARDIOMYOPATHY: ICD-10-CM

## 2024-06-20 DIAGNOSIS — I47.20 VENTRICULAR TACHYCARDIA, UNSPECIFIED: ICD-10-CM

## 2024-06-20 DIAGNOSIS — I48.91 UNSPECIFIED ATRIAL FIBRILLATION: ICD-10-CM

## 2024-06-20 PROCEDURE — 99213 OFFICE O/P EST LOW 20 MIN: CPT

## 2024-06-20 PROCEDURE — 93000 ELECTROCARDIOGRAM COMPLETE: CPT

## 2024-06-20 NOTE — ADDENDUM
[FreeTextEntry1] : Stacy BARTHOLOMEW assisted in documentation on 06/20/2024   acting as a scribe for Dr. Yaakov Scott.

## 2024-06-20 NOTE — ASSESSMENT
[FreeTextEntry1] : VT  - No events of patient's ICD.  - Normal functioning SC-ICD.   - Continue Metoprolol 100 mg once a day.   AF - No evidence of atrial fibrillation episode of the device. However, device is a single-chamber device.  - Continue Eliquis 5 mg Q12.

## 2024-06-20 NOTE — PROCEDURE
[NSR] : normal sinus rhythm [ICD] : Implantable cardioverter-defibrillator [VVI] : VVI [Impedance: ___ Ohms] : current cell impedance is [unfilled] Ohms [Charge Time: ___ sec] : charge time was [unfilled] seconds [Lead Imp:  ___ohms] : lead impedance was [unfilled] ohms [Sensing Amplitude ___mv] : sensing amplitude was [unfilled] mv [___V @] : [unfilled] V [___ ms] : [unfilled] ms [Programmed for Longevity] : output reprogrammed for improved battery longevity [de-identified] : sT Garrett [de-identified] : MARSHAL vR 1411-7 [de-identified] : [id number] [de-identified] : 9/8/2016

## 2024-06-20 NOTE — PHYSICAL EXAM
[General Appearance - Well Developed] : well developed [Normal Appearance] : normal appearance [Well Groomed] : well groomed [General Appearance - Well Nourished] : well nourished [No Deformities] : no deformities [General Appearance - In No Acute Distress] : no acute distress [Heart Rate And Rhythm] : heart rate and rhythm were normal [Heart Sounds] : normal S1 and S2 [Murmurs] : no murmurs present [Respiration, Rhythm And Depth] : normal respiratory rhythm and effort [Exaggerated Use Of Accessory Muscles For Inspiration] : no accessory muscle use [Auscultation Breath Sounds / Voice Sounds] : lungs were clear to auscultation bilaterally [Clean] : clean [Dry] : dry [Well-Healed] : well-healed [Abdomen Soft] : soft [Abdomen Tenderness] : non-tender [Abdomen Mass (___ Cm)] : no abdominal mass palpated [Nail Clubbing] : no clubbing of the fingernails [Cyanosis, Localized] : no localized cyanosis [] : no ischemic changes [Petechial Hemorrhages (___cm)] : no petechial hemorrhages

## 2024-06-20 NOTE — HISTORY OF PRESENT ILLNESS
[de-identified] : Patient with history of HTN, CAD, CABG, PAF CHADVSC 4, LV dysfunction, inducible VT s/p ICD implant. Patient comes for follow up.  06/20/2024: Patient comes to the office today for routine follow-up. Patient is without complaints and is doing well.     EKG (06/20/2024): Sinus rhythm at 61 bpm, first-degree AV block.   Echo (05/2024): EF of 41%, moderately enlarged LA, moderate TR, mild to moderate MR.

## 2024-07-18 ENCOUNTER — APPOINTMENT (OUTPATIENT)
Dept: CARDIOLOGY | Facility: CLINIC | Age: 74
End: 2024-07-18

## 2024-07-25 ENCOUNTER — APPOINTMENT (OUTPATIENT)
Dept: CARDIOLOGY | Facility: CLINIC | Age: 74
End: 2024-07-25
Payer: MEDICAID

## 2024-07-25 ENCOUNTER — NON-APPOINTMENT (OUTPATIENT)
Age: 74
End: 2024-07-25

## 2024-07-25 PROCEDURE — 93296 REM INTERROG EVL PM/IDS: CPT | Mod: NC

## 2024-07-25 PROCEDURE — 93295 DEV INTERROG REMOTE 1/2/MLT: CPT

## 2024-09-12 ENCOUNTER — APPOINTMENT (OUTPATIENT)
Dept: INTERNAL MEDICINE | Facility: CLINIC | Age: 74
End: 2024-09-12

## 2024-09-12 ENCOUNTER — OUTPATIENT (OUTPATIENT)
Dept: OUTPATIENT SERVICES | Facility: HOSPITAL | Age: 74
LOS: 1 days | End: 2024-09-12
Payer: MEDICARE

## 2024-09-12 VITALS
HEART RATE: 67 BPM | WEIGHT: 179 LBS | DIASTOLIC BLOOD PRESSURE: 68 MMHG | HEIGHT: 62 IN | TEMPERATURE: 97.2 F | BODY MASS INDEX: 32.94 KG/M2 | SYSTOLIC BLOOD PRESSURE: 138 MMHG | OXYGEN SATURATION: 99 %

## 2024-09-12 DIAGNOSIS — Z95.810 PRESENCE OF AUTOMATIC (IMPLANTABLE) CARDIAC DEFIBRILLATOR: Chronic | ICD-10-CM

## 2024-09-12 DIAGNOSIS — Z00.00 ENCOUNTER FOR GENERAL ADULT MEDICAL EXAMINATION WITHOUT ABNORMAL FINDINGS: ICD-10-CM

## 2024-09-12 DIAGNOSIS — E87.1 HYPO-OSMOLALITY AND HYPONATREMIA: ICD-10-CM

## 2024-09-12 DIAGNOSIS — Z95.1 PRESENCE OF AORTOCORONARY BYPASS GRAFT: Chronic | ICD-10-CM

## 2024-09-12 DIAGNOSIS — M85.80 OTHER SPECIFIED DISORDERS OF BONE DENSITY AND STRUCTURE, UNSPECIFIED SITE: ICD-10-CM

## 2024-09-12 DIAGNOSIS — R73.03 PREDIABETES.: ICD-10-CM

## 2024-09-12 DIAGNOSIS — I50.22 CHRONIC SYSTOLIC (CONGESTIVE) HEART FAILURE: ICD-10-CM

## 2024-09-12 DIAGNOSIS — I25.5 ISCHEMIC CARDIOMYOPATHY: ICD-10-CM

## 2024-09-12 DIAGNOSIS — Z98.890 OTHER SPECIFIED POSTPROCEDURAL STATES: Chronic | ICD-10-CM

## 2024-09-12 DIAGNOSIS — E78.5 HYPERLIPIDEMIA, UNSPECIFIED: ICD-10-CM

## 2024-09-12 DIAGNOSIS — I10 ESSENTIAL (PRIMARY) HYPERTENSION: ICD-10-CM

## 2024-09-12 DIAGNOSIS — Z95.810 PRESENCE OF AUTOMATIC (IMPLANTABLE) CARDIAC DEFIBRILLATOR: ICD-10-CM

## 2024-09-12 DIAGNOSIS — Z23 ENCOUNTER FOR IMMUNIZATION: ICD-10-CM

## 2024-09-12 DIAGNOSIS — G25.81 RESTLESS LEGS SYNDROME: ICD-10-CM

## 2024-09-12 DIAGNOSIS — Z00.00 ENCOUNTER FOR GENERAL ADULT MEDICAL EXAMINATION W/OUT ABNORMAL FINDINGS: ICD-10-CM

## 2024-09-12 PROCEDURE — 99214 OFFICE O/P EST MOD 30 MIN: CPT | Mod: 25

## 2024-09-12 PROCEDURE — 90662 IIV NO PRSV INCREASED AG IM: CPT

## 2024-09-12 PROCEDURE — T1013: CPT

## 2024-09-12 PROCEDURE — 99214 OFFICE O/P EST MOD 30 MIN: CPT

## 2024-09-12 PROCEDURE — 90471 IMMUNIZATION ADMIN: CPT

## 2024-09-12 RX ORDER — GABAPENTIN 100 MG/1
100 CAPSULE ORAL
Qty: 90 | Refills: 2 | Status: ACTIVE | COMMUNITY
Start: 2024-09-12 | End: 1900-01-01

## 2024-09-12 NOTE — END OF VISIT
[] : Resident [FreeTextEntry3] : I saw the patient, examined the patient independently, reviewed medical record, and provided the medical services. Agree with resident note as personally edited above. stop ropinirole , trial gabapentin quinine/tonic/ magnesium had not worked neuro f/u HF well compensated

## 2024-09-12 NOTE — INTERPRETER SERVICES
[[employer] ] : provided by [employer]   [Time Spent: ____ minutes] : Total time spent using  services: [unfilled] minutes. The patient's primary language is not English thus required  services. [Interpreters_IDNumber] : [id number] [Interpreters_FullName] : Shabbir [TWNoteComboBox1] : Zack

## 2024-09-12 NOTE — PHYSICAL EXAM
[No Acute Distress] : no acute distress [No Accessory Muscle Use] : no accessory muscle use [Clear to Auscultation] : lungs were clear to auscultation bilaterally [Regular Rhythm] : with a regular rhythm [Normal S1, S2] : normal S1 and S2 [Soft] : abdomen soft [Non Tender] : non-tender [Non-distended] : non-distended [No Joint Swelling] : no joint swelling [No Rash] : no rash [No Focal Deficits] : no focal deficits [Normal Insight/Judgement] : insight and judgment were intact [Well Nourished] : well nourished [Kyphosis] : no kyphosis [Scoliosis] : no scoliosis [de-identified] : thick neck [de-identified] : overweight [de-identified] : b/l LE scars cdi, varicose veins [de-identified] : denies depression or si/hi

## 2024-09-12 NOTE — HISTORY OF PRESENT ILLNESS
[FreeTextEntry1] : Routine follow up.  [de-identified] : 74 year old female hx of Chronic Systolic CHF, Compensated, ICM s/p AICD, HTN, HLD, Possible PATRICIA, Pre-DM, Restless Leg Syndrome and pre-diabetes here for f/u evaluation. She endorses her restless leg symptoms are still persistent despite trial of Ropinirole and tonic water. She has been having trouble sleeping and requesting sleep medications. No other active complaints.

## 2024-09-12 NOTE — PLAN
[FreeTextEntry1] : # Restless leg syndrome  - CPK normal, Ferritin Normal - Aldolase low; Rheumatoid Factor 19 - held statin for 10 days, no improvement - trial of tonic water before bed, no improvement  - trial ropinirole 0.25 qhs, had no improvement - will stop - will trial gabapentin 100 mg in am and 200 mg qhs  counselled patient on how/when to take medication and about side effects - Referral to neuro- movement specialist, patient says she has an appointment scheduled   # Normocytic anemia  - Hemoglobin 11.2 --> 11.3 -> 11.9  - B12 and folate levels normal - Had colonoscopy has hemorrhoids   # Hypothyroidism resolved - tsh 5.56 -->3.29 - t4 normal  - not symptomatic will continue to trend   # Chronic Systolic CHF, Compensated. # ICM s/p AICD - Follow with Dr Bolton (Cardiology) - Follow with Dr Bolton   - Cont ASA / Eliquis - Cont Lipitor - Cont Entresto, Toprol, Spironolactone - Cont Imdur / Ranexa - Remain off Chlorthalidone - Cardiac rehab  # Hyponatremia likely multifactorial (hypovolemia / SIADH in setting of vomiting / thiazide) - Mild chronic component.  # PATRICIA/OHS - STOP-BANG Score Intermediate - Referral to pulm for sleep study, not done yet, will send referral again  # pre-DM - A1c 6.0 (01/24) - Dietary modification - Recommended weight loss Hyperlipidemia; Low fat, low cholesterol diet. Discussed importance of eating a heart healthy diet Counseled on aerobic exercise and weight loss Fasting lipid panel every 3 months Treatment options and possible side effects discussed Smoking cessation discussed, if applicable Patient counseled on effects of ETOH on lipid levels  # HTN - BP this visit 138/68 mm hg - At home the BP is elevated to 130-140s DASH diet discussed and recommended Exercise and weight loss counseled Frequency and target at home BP readings discussed Decrease caffeine intake Treatment options and possible side effects discussed Patient counseled on symptoms of hypo/hypertension Counseled: Yearly Ophthalmology exams  # Obesity; Diet/Exercise Counseled Patient was counseled on changing their diet to low fat, low carbohydrates and low cholesterol. Patient was also counseled on increasing their activity to 45 minutes of exercise daily.  # Health Care Maintenance  - Mammogram: Aug 2023, BI-RADS 1, discussed with pt, will send referral  Mammogram Cancer Screening; Patient counseled on the importance of a yearly mammogram screening and directed to have test performed or follow-up with OB/GYN. Failure to perform test can result in breast cancer and death - Colonoscopy: done 2016, normal results - Pap smear: Not needed as age > 65 and previous ones negative as per patient.  - DEXA March 2023: Osteopenia (T-score -1.7) Lumbar Spine, Repeat in 2-3 years.  - Flu Vaccine administered today Vaccine; All vaccine side effects discussed with pt prior to injection. Tolerated well by patient. Patient instructed to return to office if any side effects; including, but not limited to, rash, fever or vomiting occur. - F/u in 6 months with routine labs

## 2024-09-26 DIAGNOSIS — I50.22 CHRONIC SYSTOLIC (CONGESTIVE) HEART FAILURE: ICD-10-CM

## 2024-09-26 DIAGNOSIS — E78.5 HYPERLIPIDEMIA, UNSPECIFIED: ICD-10-CM

## 2024-09-26 DIAGNOSIS — I10 ESSENTIAL (PRIMARY) HYPERTENSION: ICD-10-CM

## 2024-09-26 DIAGNOSIS — Z95.810 PRESENCE OF AUTOMATIC (IMPLANTABLE) CARDIAC DEFIBRILLATOR: ICD-10-CM

## 2024-09-26 DIAGNOSIS — R73.03 PREDIABETES: ICD-10-CM

## 2024-09-26 DIAGNOSIS — Z23 ENCOUNTER FOR IMMUNIZATION: ICD-10-CM

## 2024-09-26 DIAGNOSIS — I25.5 ISCHEMIC CARDIOMYOPATHY: ICD-10-CM

## 2024-09-26 DIAGNOSIS — G25.81 RESTLESS LEGS SYNDROME: ICD-10-CM

## 2024-09-26 DIAGNOSIS — M85.80 OTHER SPECIFIED DISORDERS OF BONE DENSITY AND STRUCTURE, UNSPECIFIED SITE: ICD-10-CM

## 2024-09-26 DIAGNOSIS — E78.1 PURE HYPERGLYCERIDEMIA: ICD-10-CM

## 2024-09-30 ENCOUNTER — APPOINTMENT (OUTPATIENT)
Dept: CARDIOLOGY | Facility: CLINIC | Age: 74
End: 2024-09-30

## 2024-10-17 ENCOUNTER — APPOINTMENT (OUTPATIENT)
Dept: CARDIOLOGY | Facility: CLINIC | Age: 74
End: 2024-10-17

## 2024-10-21 ENCOUNTER — APPOINTMENT (OUTPATIENT)
Dept: CARDIOLOGY | Facility: CLINIC | Age: 74
End: 2024-10-21
Payer: MEDICAID

## 2024-10-21 ENCOUNTER — LABORATORY RESULT (OUTPATIENT)
Age: 74
End: 2024-10-21

## 2024-10-21 VITALS — HEART RATE: 78 BPM | DIASTOLIC BLOOD PRESSURE: 70 MMHG | SYSTOLIC BLOOD PRESSURE: 114 MMHG

## 2024-10-21 DIAGNOSIS — I25.5 ISCHEMIC CARDIOMYOPATHY: ICD-10-CM

## 2024-10-21 DIAGNOSIS — I10 ESSENTIAL (PRIMARY) HYPERTENSION: ICD-10-CM

## 2024-10-21 DIAGNOSIS — I34.0 NONRHEUMATIC MITRAL (VALVE) INSUFFICIENCY: ICD-10-CM

## 2024-10-21 DIAGNOSIS — I50.22 CHRONIC SYSTOLIC (CONGESTIVE) HEART FAILURE: ICD-10-CM

## 2024-10-21 DIAGNOSIS — I25.10 ATHEROSCLEROTIC HEART DISEASE OF NATIVE CORONARY ARTERY W/OUT ANGINA PECTORIS: ICD-10-CM

## 2024-10-21 LAB
CHOLEST SERPL-MCNC: 159 MG/DL
ESTIMATED AVERAGE GLUCOSE: 108 MG/DL
HBA1C MFR BLD HPLC: 5.4 %
HDLC SERPL-MCNC: 84 MG/DL
LDLC SERPL CALC-MCNC: 57 MG/DL
NONHDLC SERPL-MCNC: 75 MG/DL
TRIGL SERPL-MCNC: 92 MG/DL
TSH SERPL-ACNC: 3.32 UIU/ML

## 2024-10-21 PROCEDURE — 93000 ELECTROCARDIOGRAM COMPLETE: CPT

## 2024-10-21 PROCEDURE — 99214 OFFICE O/P EST MOD 30 MIN: CPT

## 2024-10-22 LAB
ALBUMIN SERPL ELPH-MCNC: 4.5 G/DL
ALP BLD-CCNC: 70 U/L
ALT SERPL-CCNC: 32 U/L
ANION GAP SERPL CALC-SCNC: 14 MMOL/L
AST SERPL-CCNC: 22 U/L
BASOPHILS # BLD AUTO: 0.02 K/UL
BASOPHILS NFR BLD AUTO: 0.4 %
BILIRUB SERPL-MCNC: 0.6 MG/DL
BUN SERPL-MCNC: 22 MG/DL
CALCIUM SERPL-MCNC: 9.9 MG/DL
CHLORIDE SERPL-SCNC: 93 MMOL/L
CO2 SERPL-SCNC: 24 MMOL/L
CREAT SERPL-MCNC: 1 MG/DL
EGFR: 59 ML/MIN/1.73M2
EOSINOPHIL # BLD AUTO: 0.03 K/UL
EOSINOPHIL NFR BLD AUTO: 0.6 %
GLUCOSE SERPL-MCNC: 108 MG/DL
HCT VFR BLD CALC: 32 %
HGB BLD-MCNC: 11.1 G/DL
IMM GRANULOCYTES NFR BLD AUTO: 0.2 %
LYMPHOCYTES # BLD AUTO: 1.94 K/UL
LYMPHOCYTES NFR BLD AUTO: 39.8 %
MAN DIFF?: NORMAL
MCHC RBC-ENTMCNC: 34.7 GM/DL
MCHC RBC-ENTMCNC: 35 PG
MCV RBC AUTO: 100.9 FL
MONOCYTES # BLD AUTO: 0.59 K/UL
MONOCYTES NFR BLD AUTO: 12.1 %
NEUTROPHILS # BLD AUTO: 2.28 K/UL
NEUTROPHILS NFR BLD AUTO: 46.9 %
PLATELET # BLD AUTO: 244 K/UL
POTASSIUM SERPL-SCNC: 4.1 MMOL/L
PROT SERPL-MCNC: 7 G/DL
RBC # BLD: 3.17 M/UL
RBC # FLD: 13.6 %
SODIUM SERPL-SCNC: 131 MMOL/L
WBC # FLD AUTO: 4.87 K/UL

## 2024-10-24 ENCOUNTER — APPOINTMENT (OUTPATIENT)
Dept: CARDIOLOGY | Facility: CLINIC | Age: 74
End: 2024-10-24

## 2024-10-24 PROCEDURE — 93296 REM INTERROG EVL PM/IDS: CPT | Mod: NC

## 2024-10-24 PROCEDURE — 93295 DEV INTERROG REMOTE 1/2/MLT: CPT

## 2024-10-28 ENCOUNTER — RX RENEWAL (OUTPATIENT)
Age: 74
End: 2024-10-28

## 2024-11-25 ENCOUNTER — RX RENEWAL (OUTPATIENT)
Age: 74
End: 2024-11-25

## 2024-12-03 ENCOUNTER — RX RENEWAL (OUTPATIENT)
Age: 74
End: 2024-12-03

## 2024-12-11 ENCOUNTER — RX RENEWAL (OUTPATIENT)
Age: 74
End: 2024-12-11

## 2025-01-04 ENCOUNTER — INPATIENT (INPATIENT)
Facility: HOSPITAL | Age: 75
LOS: 2 days | Discharge: HOME | DRG: 194 | End: 2025-01-07
Attending: INTERNAL MEDICINE | Admitting: STUDENT IN AN ORGANIZED HEALTH CARE EDUCATION/TRAINING PROGRAM
Payer: MEDICAID

## 2025-01-04 VITALS
OXYGEN SATURATION: 99 % | RESPIRATION RATE: 16 BRPM | HEART RATE: 97 BPM | TEMPERATURE: 98 F | DIASTOLIC BLOOD PRESSURE: 92 MMHG | SYSTOLIC BLOOD PRESSURE: 163 MMHG | WEIGHT: 182.98 LBS

## 2025-01-04 DIAGNOSIS — Z98.890 OTHER SPECIFIED POSTPROCEDURAL STATES: Chronic | ICD-10-CM

## 2025-01-04 DIAGNOSIS — Z95.1 PRESENCE OF AORTOCORONARY BYPASS GRAFT: Chronic | ICD-10-CM

## 2025-01-04 DIAGNOSIS — Z95.810 PRESENCE OF AUTOMATIC (IMPLANTABLE) CARDIAC DEFIBRILLATOR: Chronic | ICD-10-CM

## 2025-01-04 PROCEDURE — 93010 ELECTROCARDIOGRAM REPORT: CPT

## 2025-01-04 PROCEDURE — 99285 EMERGENCY DEPT VISIT HI MDM: CPT

## 2025-01-05 DIAGNOSIS — I50.23 ACUTE ON CHRONIC SYSTOLIC (CONGESTIVE) HEART FAILURE: ICD-10-CM

## 2025-01-05 LAB
ALBUMIN SERPL ELPH-MCNC: 3.9 G/DL — SIGNIFICANT CHANGE UP (ref 3.5–5.2)
ALBUMIN SERPL ELPH-MCNC: 4.3 G/DL — SIGNIFICANT CHANGE UP (ref 3.5–5.2)
ALP SERPL-CCNC: 70 U/L — SIGNIFICANT CHANGE UP (ref 30–115)
ALP SERPL-CCNC: 70 U/L — SIGNIFICANT CHANGE UP (ref 30–115)
ALT FLD-CCNC: 60 U/L — HIGH (ref 0–41)
ALT FLD-CCNC: 83 U/L — HIGH (ref 0–41)
ANION GAP SERPL CALC-SCNC: 13 MMOL/L — SIGNIFICANT CHANGE UP (ref 7–14)
ANION GAP SERPL CALC-SCNC: 15 MMOL/L — HIGH (ref 7–14)
APPEARANCE UR: CLEAR — SIGNIFICANT CHANGE UP
AST SERPL-CCNC: 59 U/L — HIGH (ref 0–41)
AST SERPL-CCNC: 77 U/L — HIGH (ref 0–41)
BACTERIA # UR AUTO: NEGATIVE /HPF — SIGNIFICANT CHANGE UP
BASOPHILS # BLD AUTO: 0.03 K/UL — SIGNIFICANT CHANGE UP (ref 0–0.2)
BASOPHILS # BLD AUTO: 0.03 K/UL — SIGNIFICANT CHANGE UP (ref 0–0.2)
BASOPHILS NFR BLD AUTO: 0.5 % — SIGNIFICANT CHANGE UP (ref 0–1)
BASOPHILS NFR BLD AUTO: 0.6 % — SIGNIFICANT CHANGE UP (ref 0–1)
BILIRUB SERPL-MCNC: 0.8 MG/DL — SIGNIFICANT CHANGE UP (ref 0.2–1.2)
BILIRUB SERPL-MCNC: 0.9 MG/DL — SIGNIFICANT CHANGE UP (ref 0.2–1.2)
BILIRUB UR-MCNC: NEGATIVE — SIGNIFICANT CHANGE UP
BUN SERPL-MCNC: 22 MG/DL — HIGH (ref 10–20)
BUN SERPL-MCNC: 22 MG/DL — HIGH (ref 10–20)
CALCIUM SERPL-MCNC: 8.9 MG/DL — SIGNIFICANT CHANGE UP (ref 8.4–10.5)
CALCIUM SERPL-MCNC: 9.5 MG/DL — SIGNIFICANT CHANGE UP (ref 8.4–10.4)
CAST: 0 /LPF — SIGNIFICANT CHANGE UP (ref 0–4)
CHLORIDE SERPL-SCNC: 96 MMOL/L — LOW (ref 98–110)
CHLORIDE SERPL-SCNC: 97 MMOL/L — LOW (ref 98–110)
CO2 SERPL-SCNC: 19 MMOL/L — SIGNIFICANT CHANGE UP (ref 17–32)
CO2 SERPL-SCNC: 24 MMOL/L — SIGNIFICANT CHANGE UP (ref 17–32)
COLOR SPEC: YELLOW — SIGNIFICANT CHANGE UP
CREAT ?TM UR-MCNC: 5 MG/DL — SIGNIFICANT CHANGE UP
CREAT SERPL-MCNC: 1 MG/DL — SIGNIFICANT CHANGE UP (ref 0.7–1.5)
CREAT SERPL-MCNC: 1.1 MG/DL — SIGNIFICANT CHANGE UP (ref 0.7–1.5)
DIFF PNL FLD: ABNORMAL
EGFR: 53 ML/MIN/1.73M2 — LOW
EGFR: 59 ML/MIN/1.73M2 — LOW
EOSINOPHIL # BLD AUTO: 0.02 K/UL — SIGNIFICANT CHANGE UP (ref 0–0.7)
EOSINOPHIL # BLD AUTO: 0.04 K/UL — SIGNIFICANT CHANGE UP (ref 0–0.7)
EOSINOPHIL NFR BLD AUTO: 0.3 % — SIGNIFICANT CHANGE UP (ref 0–8)
EOSINOPHIL NFR BLD AUTO: 0.7 % — SIGNIFICANT CHANGE UP (ref 0–8)
FLUAV AG NPH QL: SIGNIFICANT CHANGE UP
FLUBV AG NPH QL: SIGNIFICANT CHANGE UP
GLUCOSE SERPL-MCNC: 91 MG/DL — SIGNIFICANT CHANGE UP (ref 70–99)
GLUCOSE SERPL-MCNC: 97 MG/DL — SIGNIFICANT CHANGE UP (ref 70–99)
GLUCOSE UR QL: NEGATIVE MG/DL — SIGNIFICANT CHANGE UP
HCT VFR BLD CALC: 29.7 % — LOW (ref 37–47)
HCT VFR BLD CALC: 32.8 % — LOW (ref 37–47)
HGB BLD-MCNC: 10.1 G/DL — LOW (ref 12–16)
HGB BLD-MCNC: 11.2 G/DL — LOW (ref 12–16)
IMM GRANULOCYTES NFR BLD AUTO: 0.2 % — SIGNIFICANT CHANGE UP (ref 0.1–0.3)
IMM GRANULOCYTES NFR BLD AUTO: 0.3 % — SIGNIFICANT CHANGE UP (ref 0.1–0.3)
KETONES UR-MCNC: NEGATIVE MG/DL — SIGNIFICANT CHANGE UP
LEUKOCYTE ESTERASE UR-ACNC: ABNORMAL
LYMPHOCYTES # BLD AUTO: 1.62 K/UL — SIGNIFICANT CHANGE UP (ref 1.2–3.4)
LYMPHOCYTES # BLD AUTO: 1.93 K/UL — SIGNIFICANT CHANGE UP (ref 1.2–3.4)
LYMPHOCYTES # BLD AUTO: 30 % — SIGNIFICANT CHANGE UP (ref 20.5–51.1)
LYMPHOCYTES # BLD AUTO: 32.2 % — SIGNIFICANT CHANGE UP (ref 20.5–51.1)
MAGNESIUM SERPL-MCNC: 1.5 MG/DL — LOW (ref 1.8–2.4)
MAGNESIUM SERPL-MCNC: 2 MG/DL — SIGNIFICANT CHANGE UP (ref 1.8–2.4)
MCHC RBC-ENTMCNC: 34 G/DL — SIGNIFICANT CHANGE UP (ref 32–37)
MCHC RBC-ENTMCNC: 34 PG — HIGH (ref 27–31)
MCHC RBC-ENTMCNC: 34 PG — HIGH (ref 27–31)
MCHC RBC-ENTMCNC: 34.1 G/DL — SIGNIFICANT CHANGE UP (ref 32–37)
MCV RBC AUTO: 100 FL — HIGH (ref 81–99)
MCV RBC AUTO: 99.7 FL — HIGH (ref 81–99)
MONOCYTES # BLD AUTO: 0.74 K/UL — HIGH (ref 0.1–0.6)
MONOCYTES # BLD AUTO: 0.87 K/UL — HIGH (ref 0.1–0.6)
MONOCYTES NFR BLD AUTO: 13.7 % — HIGH (ref 1.7–9.3)
MONOCYTES NFR BLD AUTO: 14.5 % — HIGH (ref 1.7–9.3)
NEUTROPHILS # BLD AUTO: 2.96 K/UL — SIGNIFICANT CHANGE UP (ref 1.4–6.5)
NEUTROPHILS # BLD AUTO: 3.12 K/UL — SIGNIFICANT CHANGE UP (ref 1.4–6.5)
NEUTROPHILS NFR BLD AUTO: 52.2 % — SIGNIFICANT CHANGE UP (ref 42.2–75.2)
NEUTROPHILS NFR BLD AUTO: 54.8 % — SIGNIFICANT CHANGE UP (ref 42.2–75.2)
NITRITE UR-MCNC: NEGATIVE — SIGNIFICANT CHANGE UP
NRBC # BLD: 0 /100 WBCS — SIGNIFICANT CHANGE UP (ref 0–0)
NRBC # BLD: 0 /100 WBCS — SIGNIFICANT CHANGE UP (ref 0–0)
NT-PROBNP SERPL-SCNC: 2478 PG/ML — HIGH (ref 0–300)
OSMOLALITY UR: 243 MOS/KG — SIGNIFICANT CHANGE UP (ref 50–1200)
PH UR: 6.5 — SIGNIFICANT CHANGE UP (ref 5–8)
PLATELET # BLD AUTO: 223 K/UL — SIGNIFICANT CHANGE UP (ref 130–400)
PLATELET # BLD AUTO: 262 K/UL — SIGNIFICANT CHANGE UP (ref 130–400)
PMV BLD: 10.6 FL — HIGH (ref 7.4–10.4)
PMV BLD: 10.8 FL — HIGH (ref 7.4–10.4)
POTASSIUM SERPL-MCNC: 4.2 MMOL/L — SIGNIFICANT CHANGE UP (ref 3.5–5)
POTASSIUM SERPL-MCNC: 4.4 MMOL/L — SIGNIFICANT CHANGE UP (ref 3.5–5)
POTASSIUM SERPL-SCNC: 4.2 MMOL/L — SIGNIFICANT CHANGE UP (ref 3.5–5)
POTASSIUM SERPL-SCNC: 4.4 MMOL/L — SIGNIFICANT CHANGE UP (ref 3.5–5)
POTASSIUM UR-SCNC: 10 MMOL/L — SIGNIFICANT CHANGE UP
PROT ?TM UR-MCNC: <5 MG/DLG/24H — SIGNIFICANT CHANGE UP
PROT SERPL-MCNC: 5.9 G/DL — LOW (ref 6–8)
PROT SERPL-MCNC: 7 G/DL — SIGNIFICANT CHANGE UP (ref 6–8)
PROT UR-MCNC: NEGATIVE MG/DL — SIGNIFICANT CHANGE UP
PROT/CREAT UR-RTO: <1 RATIO — HIGH (ref 0–0.2)
RBC # BLD: 2.97 M/UL — LOW (ref 4.2–5.4)
RBC # BLD: 3.29 M/UL — LOW (ref 4.2–5.4)
RBC # FLD: 13.5 % — SIGNIFICANT CHANGE UP (ref 11.5–14.5)
RBC # FLD: 13.6 % — SIGNIFICANT CHANGE UP (ref 11.5–14.5)
RBC CASTS # UR COMP ASSIST: 2 /HPF — SIGNIFICANT CHANGE UP (ref 0–4)
RSV RNA NPH QL NAA+NON-PROBE: SIGNIFICANT CHANGE UP
SARS-COV-2 RNA SPEC QL NAA+PROBE: SIGNIFICANT CHANGE UP
SODIUM SERPL-SCNC: 129 MMOL/L — LOW (ref 135–146)
SODIUM SERPL-SCNC: 135 MMOL/L — SIGNIFICANT CHANGE UP (ref 135–146)
SODIUM UR-SCNC: 102 MMOL/L — SIGNIFICANT CHANGE UP
SP GR SPEC: 1.01 — SIGNIFICANT CHANGE UP (ref 1–1.03)
SQUAMOUS # UR AUTO: 1 /HPF — SIGNIFICANT CHANGE UP (ref 0–5)
TROPONIN T, HIGH SENSITIVITY RESULT: 18 NG/L — HIGH (ref 6–13)
TROPONIN T, HIGH SENSITIVITY RESULT: 19 NG/L — HIGH (ref 6–13)
UROBILINOGEN FLD QL: 0.2 MG/DL — SIGNIFICANT CHANGE UP (ref 0.2–1)
UUN UR-MCNC: <6 MG/DL — SIGNIFICANT CHANGE UP
WBC # BLD: 5.4 K/UL — SIGNIFICANT CHANGE UP (ref 4.8–10.8)
WBC # BLD: 5.99 K/UL — SIGNIFICANT CHANGE UP (ref 4.8–10.8)
WBC # FLD AUTO: 5.4 K/UL — SIGNIFICANT CHANGE UP (ref 4.8–10.8)
WBC # FLD AUTO: 5.99 K/UL — SIGNIFICANT CHANGE UP (ref 4.8–10.8)
WBC UR QL: 8 /HPF — HIGH (ref 0–5)

## 2025-01-05 PROCEDURE — 82570 ASSAY OF URINE CREATININE: CPT

## 2025-01-05 PROCEDURE — 84133 ASSAY OF URINE POTASSIUM: CPT

## 2025-01-05 PROCEDURE — 84540 ASSAY OF URINE/UREA-N: CPT

## 2025-01-05 PROCEDURE — 82607 VITAMIN B-12: CPT

## 2025-01-05 PROCEDURE — 97161 PT EVAL LOW COMPLEX 20 MIN: CPT | Mod: GP

## 2025-01-05 PROCEDURE — 71045 X-RAY EXAM CHEST 1 VIEW: CPT | Mod: 26

## 2025-01-05 PROCEDURE — 0241U: CPT

## 2025-01-05 PROCEDURE — 84156 ASSAY OF PROTEIN URINE: CPT

## 2025-01-05 PROCEDURE — 93306 TTE W/DOPPLER COMPLETE: CPT

## 2025-01-05 PROCEDURE — 99223 1ST HOSP IP/OBS HIGH 75: CPT

## 2025-01-05 PROCEDURE — 84443 ASSAY THYROID STIM HORMONE: CPT

## 2025-01-05 PROCEDURE — 84300 ASSAY OF URINE SODIUM: CPT

## 2025-01-05 PROCEDURE — 84484 ASSAY OF TROPONIN QUANT: CPT

## 2025-01-05 PROCEDURE — 83735 ASSAY OF MAGNESIUM: CPT

## 2025-01-05 PROCEDURE — 80074 ACUTE HEPATITIS PANEL: CPT

## 2025-01-05 PROCEDURE — 76705 ECHO EXAM OF ABDOMEN: CPT

## 2025-01-05 PROCEDURE — 76705 ECHO EXAM OF ABDOMEN: CPT | Mod: 26

## 2025-01-05 PROCEDURE — 71045 X-RAY EXAM CHEST 1 VIEW: CPT

## 2025-01-05 PROCEDURE — 93970 EXTREMITY STUDY: CPT

## 2025-01-05 PROCEDURE — 85027 COMPLETE CBC AUTOMATED: CPT

## 2025-01-05 PROCEDURE — 93970 EXTREMITY STUDY: CPT | Mod: 26

## 2025-01-05 PROCEDURE — 82746 ASSAY OF FOLIC ACID SERUM: CPT

## 2025-01-05 PROCEDURE — 80053 COMPREHEN METABOLIC PANEL: CPT

## 2025-01-05 PROCEDURE — 81001 URINALYSIS AUTO W/SCOPE: CPT

## 2025-01-05 PROCEDURE — 85025 COMPLETE CBC W/AUTO DIFF WBC: CPT

## 2025-01-05 PROCEDURE — 36415 COLL VENOUS BLD VENIPUNCTURE: CPT

## 2025-01-05 PROCEDURE — 83935 ASSAY OF URINE OSMOLALITY: CPT

## 2025-01-05 PROCEDURE — 93282 PRGRMG EVAL IMPLANTABLE DFB: CPT

## 2025-01-05 PROCEDURE — 93282 PRGRMG EVAL IMPLANTABLE DFB: CPT | Mod: 26

## 2025-01-05 RX ORDER — FUROSEMIDE 20 MG
40 TABLET ORAL ONCE
Refills: 0 | Status: COMPLETED | OUTPATIENT
Start: 2025-01-05 | End: 2025-01-05

## 2025-01-05 RX ORDER — APIXABAN 5 MG/1
5 TABLET, FILM COATED ORAL EVERY 12 HOURS
Refills: 0 | Status: DISCONTINUED | OUTPATIENT
Start: 2025-01-05 | End: 2025-01-07

## 2025-01-05 RX ORDER — ISOSORBIDE MONONITRATE 120 MG
30 TABLET, EXTENDED RELEASE 24 HR ORAL DAILY
Refills: 0 | Status: DISCONTINUED | OUTPATIENT
Start: 2025-01-05 | End: 2025-01-07

## 2025-01-05 RX ORDER — FUROSEMIDE 20 MG
40 TABLET ORAL
Refills: 0 | Status: DISCONTINUED | OUTPATIENT
Start: 2025-01-05 | End: 2025-01-05

## 2025-01-05 RX ORDER — ATORVASTATIN CALCIUM 40 MG/1
1 TABLET, FILM COATED ORAL
Refills: 0 | DISCHARGE

## 2025-01-05 RX ORDER — FUROSEMIDE 20 MG
40 TABLET ORAL DAILY
Refills: 0 | Status: DISCONTINUED | OUTPATIENT
Start: 2025-01-05 | End: 2025-01-07

## 2025-01-05 RX ORDER — MAGNESIUM SULFATE 500 MG/ML
2 INJECTION, SOLUTION INTRAMUSCULAR; INTRAVENOUS ONCE
Refills: 0 | Status: COMPLETED | OUTPATIENT
Start: 2025-01-05 | End: 2025-01-05

## 2025-01-05 RX ORDER — RANOLAZINE 1000 MG/1
1000 TABLET, FILM COATED, EXTENDED RELEASE ORAL
Refills: 0 | Status: DISCONTINUED | OUTPATIENT
Start: 2025-01-05 | End: 2025-01-07

## 2025-01-05 RX ORDER — FAMOTIDINE 20 MG/1
20 TABLET, FILM COATED ORAL DAILY
Refills: 0 | Status: DISCONTINUED | OUTPATIENT
Start: 2025-01-05 | End: 2025-01-07

## 2025-01-05 RX ORDER — SACUBITRIL AND VALSARTAN 24; 26 MG/1; MG/1
1 TABLET, FILM COATED ORAL
Refills: 0 | Status: DISCONTINUED | OUTPATIENT
Start: 2025-01-05 | End: 2025-01-07

## 2025-01-05 RX ORDER — ASPIRIN 81 MG
81 TABLET, DELAYED RELEASE (ENTERIC COATED) ORAL DAILY
Refills: 0 | Status: DISCONTINUED | OUTPATIENT
Start: 2025-01-05 | End: 2025-01-07

## 2025-01-05 RX ORDER — MAGNESIUM SULFATE 500 MG/ML
2 INJECTION, SOLUTION INTRAMUSCULAR; INTRAVENOUS ONCE
Refills: 0 | Status: DISCONTINUED | OUTPATIENT
Start: 2025-01-05 | End: 2025-01-05

## 2025-01-05 RX ORDER — SPIRONOLACTONE 50 MG/1
25 TABLET ORAL DAILY
Refills: 0 | Status: DISCONTINUED | OUTPATIENT
Start: 2025-01-05 | End: 2025-01-07

## 2025-01-05 RX ORDER — METOPROLOL TARTRATE 50 MG
100 TABLET ORAL AT BEDTIME
Refills: 0 | Status: DISCONTINUED | OUTPATIENT
Start: 2025-01-05 | End: 2025-01-07

## 2025-01-05 RX ORDER — CALCIUM CARBONATE/VITAMIN D3 500MG-5MCG
1 TABLET ORAL DAILY
Refills: 0 | Status: DISCONTINUED | OUTPATIENT
Start: 2025-01-05 | End: 2025-01-07

## 2025-01-05 RX ADMIN — RANOLAZINE 1000 MILLIGRAM(S): 1000 TABLET, FILM COATED, EXTENDED RELEASE ORAL at 17:56

## 2025-01-05 RX ADMIN — SPIRONOLACTONE 25 MILLIGRAM(S): 50 TABLET ORAL at 05:58

## 2025-01-05 RX ADMIN — Medication 40 MILLIGRAM(S): at 02:19

## 2025-01-05 RX ADMIN — Medication 1 TABLET(S): at 13:03

## 2025-01-05 RX ADMIN — Medication 40 MILLIGRAM(S): at 05:58

## 2025-01-05 RX ADMIN — FAMOTIDINE 20 MILLIGRAM(S): 20 TABLET, FILM COATED ORAL at 13:03

## 2025-01-05 RX ADMIN — Medication 30 MILLIGRAM(S): at 13:04

## 2025-01-05 RX ADMIN — RANOLAZINE 1000 MILLIGRAM(S): 1000 TABLET, FILM COATED, EXTENDED RELEASE ORAL at 05:59

## 2025-01-05 RX ADMIN — APIXABAN 5 MILLIGRAM(S): 5 TABLET, FILM COATED ORAL at 05:58

## 2025-01-05 RX ADMIN — MAGNESIUM SULFATE 25 GRAM(S): 500 INJECTION, SOLUTION INTRAMUSCULAR; INTRAVENOUS at 02:19

## 2025-01-05 RX ADMIN — Medication 100 MILLIGRAM(S): at 21:20

## 2025-01-05 RX ADMIN — Medication 81 MILLIGRAM(S): at 13:03

## 2025-01-05 RX ADMIN — APIXABAN 5 MILLIGRAM(S): 5 TABLET, FILM COATED ORAL at 17:56

## 2025-01-05 RX ADMIN — SACUBITRIL AND VALSARTAN 1 TABLET(S): 24; 26 TABLET, FILM COATED ORAL at 17:56

## 2025-01-05 RX ADMIN — SACUBITRIL AND VALSARTAN 1 TABLET(S): 24; 26 TABLET, FILM COATED ORAL at 05:58

## 2025-01-05 NOTE — PHYSICAL THERAPY INITIAL EVALUATION ADULT - PERTINENT HX OF CURRENT PROBLEM, REHAB EVAL
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 Admitted to telemetry for further management of CHF exacerbation.

## 2025-01-05 NOTE — ED PROVIDER NOTE - NS ED ROS FT
Constitutional: No fever, chills.  Eyes: No visual changes.  ENT: No hearing changes.  Neck: No neck pain or stiffness.  Cardiovascular: No chest pain, palpitations, edema.  Pulmonary: + sob/dyspnea  Abdominal: no abdominal pain  : No dysuria, frequency.  Neuro: No headache, syncope, dizziness.  MS: No back pain. No calf pain/swelling.  Psych: No suicidal ideations.

## 2025-01-05 NOTE — ED PROVIDER NOTE - ED STEMI HIDDEN
Anesthesia Evaluation     Patient summary reviewed and Nursing notes reviewed                Airway   Mallampati: II  TM distance: >3 FB  Neck ROM: limited  Possible difficult intubation  Dental - normal exam     Pulmonary - negative pulmonary ROS and normal exam   Cardiovascular - normal exam    (+) hypertension, hyperlipidemia,       Neuro/Psych- negative ROS  GI/Hepatic/Renal/Endo - negative ROS     Musculoskeletal     Abdominal  - normal exam    Bowel sounds: normal.   Substance History - negative use     OB/GYN negative ob/gyn ROS         Other   arthritis,                      Anesthesia Plan    ASA 3     general   (Glidescope/chung)  intravenous induction     Anesthetic plan, all risks, benefits, and alternatives have been provided, discussed and informed consent has been obtained with: patient.    Plan discussed with CRNA.      
hide

## 2025-01-05 NOTE — H&P ADULT - ATTENDING COMMENTS
HPI as above.  Interval history: Pt seen and examined at bedside. No cp or sob.  As per son pt is non compliant to diet and drinks a lot of water at home.   Vital Signs (24 Hrs):  T(C): 36.7 (01-05-25 @ 08:32), Max: 36.7 (01-05-25 @ 08:32)  HR: 71 (01-05-25 @ 08:32) (66 - 97)  BP: 145/75 (01-05-25 @ 08:32) (145/75 - 163/92)  RR: 17 (01-05-25 @ 08:32) (16 - 17)  SpO2: 95% (01-05-25 @ 08:32) (95% - 99%)  Wt(kg): --  Daily     Daily     I&O's Summary    PHYSICAL EXAM:  GENERAL: NAD, well-developed  HEAD:  Atraumatic, Normocephalic  EYES: EOMI, PERRLA, conjunctiva and sclera clear  NECK: Supple, No JVD  CHEST/LUNG: Clear to auscultation bilaterally; No wheeze  HEART: Regular rate and rhythm; No murmurs, rubs, or gallops  ABDOMEN: Soft, Nontender, Nondistended; Bowel sounds present  EXTREMITIES:  2+ Peripheral Pulses, No clubbing, cyanosis, +2-3 edema   PSYCH: AAOx3  NEUROLOGY: non-focal  SKIN: No rashes or lesions  Labs reviewed  Imaging reviewed independently and reviewed read  EKG reviewed independently and reviewed read    Plan    #Progress Note Handoff  Pending (specify):    Family discussion: house staff updated pt family  Disposition:   Decision to admit the pt is based on acuity as above HPI as above.  Interval history: Pt seen and examined at bedside. No cp or sob.  As per son pt is non compliant to diet and drinks a lot of water at home.   Vital Signs (24 Hrs):  T(C): 36.7 (01-05-25 @ 08:32), Max: 36.7 (01-05-25 @ 08:32)  HR: 71 (01-05-25 @ 08:32) (66 - 97)  BP: 145/75 (01-05-25 @ 08:32) (145/75 - 163/92)  RR: 17 (01-05-25 @ 08:32) (16 - 17)  SpO2: 95% (01-05-25 @ 08:32) (95% - 99%)    PHYSICAL EXAM:  GENERAL: NAD, well-developed  HEAD:  Atraumatic, Normocephalic  EYES: EOMI, PERRLA, conjunctiva and sclera clear  NECK: Supple, No JVD  CHEST/LUNG: Clear to auscultation bilaterally; No wheeze  HEART: Regular rate and rhythm; No murmurs, rubs, or gallops  ABDOMEN: Soft, Nontender, Nondistended; Bowel sounds present  EXTREMITIES:  2+ Peripheral Pulses, No clubbing, cyanosis, +2-3 edema   PSYCH: AAOx3  NEUROLOGY: non-focal  SKIN: No rashes or lesions  Labs reviewed  Imaging reviewed independently and reviewed read  < from: Xray Chest 1 View- PORTABLE-Urgent (01.05.25 @ 01:00) >    IMPRESSION:    Findings compatible with CHF/fluid overload in the correct clinical   setting.      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 Admitted to telemetry for further management of CHF exacerbation.    #Dyspnea 2/2 Acute on chronic HFmrEF likely 2/2 non compliance   #Cad s/p cabg  #ICM s/p Aicd  #pAfib on eliquis   #Htn  - HD stable, on room air  - normal wbc, Troponin 19, probnp 2.4k (~400s 2022)   - CXR: bilateral opacities (pending read)  - TTE 5/24 41%, G2DD, Mod TR, Mod MR  - Lasix 40mg iv qd  (on spironolactone 25mg qd only at home), will likely need home lasix   - continue with spironolactone, asa, eliquis, entresto, metoprolol, isosorbide mono 30mg ER  - flu/covid/rsv swab  - daily weight, strict Is AND Os, monitor bmp  - EP for device interrogation       #Chronic hyponatremia, asymptomatic   - Na 129 around baseline  - monitor bmp  - urine studies     #Mild transaminitis likely 2/2 congestion  - non tender/no pain  - continue with diuresis, trend LFTs  - holding statin  - acute hepatitis panel and RUQ sono      #Progress Note Handoff  Pending (specify):    Family discussion: house staff updated pt family  Disposition:   Decision to admit the pt is based on acuity as above HPI as above.  Interval history: Pt seen and examined at bedside. No cp or sob.  As per son pt is non compliant to diet and drinks a lot of water at home.   Vital Signs (24 Hrs):  T(C): 36.7 (01-05-25 @ 08:32), Max: 36.7 (01-05-25 @ 08:32)  HR: 71 (01-05-25 @ 08:32) (66 - 97)  BP: 145/75 (01-05-25 @ 08:32) (145/75 - 163/92)  RR: 17 (01-05-25 @ 08:32) (16 - 17)  SpO2: 95% (01-05-25 @ 08:32) (95% - 99%)    PHYSICAL EXAM:  GENERAL: NAD, well-developed  HEAD:  Atraumatic, Normocephalic  EYES: EOMI, PERRLA, conjunctiva and sclera clear  NECK: Supple, No JVD  CHEST/LUNG: Clear to auscultation bilaterally; No wheeze  HEART: Regular rate and rhythm; No murmurs, rubs, or gallops  ABDOMEN: Soft, Nontender, Nondistended; Bowel sounds present  EXTREMITIES:  2+ Peripheral Pulses, No clubbing, cyanosis, +2-3 edema   PSYCH: AAOx3  NEUROLOGY: non-focal  SKIN: No rashes or lesions  Labs reviewed  Imaging reviewed independently and reviewed read  < from: Xray Chest 1 View- PORTABLE-Urgent (01.05.25 @ 01:00) >    IMPRESSION:    Findings compatible with CHF/fluid overload in the correct clinical   setting.      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 Admitted to telemetry for further management of CHF exacerbation.    #Dyspnea 2/2 Acute on chronic HFmrEF likely 2/2 non compliance to diet   #Cad s/p cabg  #ICM s/p Aicd  #pAfib on eliquis   #Htn  - HD stable, on room air  - normal wbc, Troponin 19, probnp 2.4k (~400s 2022)   - CXR: bilateral opacities (pending read)  - TTE 5/24 41%, G2DD, Mod TR, Mod MR  - Lasix 40mg iv qd  (on spironolactone 25mg qd only at home), will likely need home lasix   - continue with spironolactone, asa, eliquis, Entresto, metoprolol, isosorbide mono 30mg ER  - flu/covid/rsv swab neg   - daily weight, strict Is AND Os, monitor bmp  - EP for device interrogation neg       #Chronic hyponatremia, asymptomatic   - Na 129 around baseline  - monitor bmp  - urine studies     #Mild transaminitis likely 2/2 congestion  - non tender/no pain  - continue with diuresis, trend LFTs  - holding statin  - acute hepatitis panel and RUQ sono pending       #Progress Note Handoff  Pending (specify):  CHF management   Family discussion: house staff updated pt family  Disposition: home when cleared   Decision to admit the pt is based on acuity as above

## 2025-01-05 NOTE — ED PROVIDER NOTE - ATTENDING APP SHARED VISIT CONTRIBUTION OF CARE
I have personally performed a history and physical exam on this patient. I have personally directed the management of the patient.  Patient presents to ED for evaluation of sob, exertional dyspnea, associated with cough and B/L lower ext edema. Denies leg pain/calf pain.   Vitals reviewed.   Lungs: B/L rales, no wheezing,   Abd: +BS, NT. ND, soft,   Ext: B/L pitting edema, no calf tenderness,   A/P: Dyspnea,   Labs, EKG, CXR,   reevaluation.

## 2025-01-05 NOTE — PHYSICAL THERAPY INITIAL EVALUATION ADULT - GENERAL OBSERVATIONS, REHAB EVAL
3:30-3:54pm Pt encountered sitting at EOB, A & O x 3 in NAD, [place] speaking, daughter in-law Lisa present at b/s provided translation as per pt request. Pt observed indep in bed mobility and sit/stand transfer. Pt ambulated ~150 ft indep without AD with decreased tre but steady gait and no loss of balance noted. No skilled PT warranted secondary pt is indep in mobility, JAMESON Chandler made aware.

## 2025-01-05 NOTE — ED PROVIDER NOTE - PHYSICAL EXAMINATION
Constitutional: Well developed, well nourished. NAD  Head: Normocephalic, atraumatic.  Eyes: PERRL, EOMI.  ENT: No nasal discharge. Mucous membranes dry.  Neck: Supple. Painless ROM.  Cardiovascular:  . Regular rate and rhythm.    Pulmonary:  + bibasalar rales;   Abdominal: Soft. Nondistended. No rebound, guarding, rigidity.  Extremities. Pelvis stable. + bilat ext edema +3;   Skin: No rashes, cyanosis.  Neuro: AAOx3. No focal neurological deficits.  Psych: Normal mood. Normal affect.

## 2025-01-05 NOTE — H&P ADULT - ASSESSMENT
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 Admitted to telemetry for further management of CHF exacerbation.    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)  #ICM s/p Aicd  #pAfib on eliquis   #Htn  - HD stable, on room air  - normal wbc, Troponin 19, probnp 2.4k (~400s 2022)   - CXR: bilateral opacities (pending read)  - TTE 5/24 41%, G2DD, Mod TR, Mod MR  - Lasix 40mg iv qd  (on spironolactone 25mg qd only at home)  - continue with spironolactone, asa, eliquis, entresto, metoprolol, isosorbide mono 30mg ER  - flu/covid/rsv swab  - daily weight, strict Is AND Os, monitor bmp  - EP for device interrogation       #Chronic hyponatremia, asymptomatic   - Na 129 around baseline  - monitor bmp  - urine studies     #Mild transaminitis likely 2/2 congestion  - non tender/no pain  - continue with diuresis, trend LFTs  - holding statin  - acute hepatitis panel and RUQ sono    DVT prophylaxis - eliquis bid  GI prophylaxis - continue home famotidine   Dash diet  AAT

## 2025-01-05 NOTE — H&P ADULT - HISTORY OF PRESENT ILLNESS
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 Per patient her symptoms were slowly getting worse over last 2-3 months but today she had bp 160s/90s with sob at rest prompting her to come to the ED. Chronic cough but better per pt. Chronic chest pain +ve. Compliant with meds and diet per patient. Denies fevers/chills/sick contacts/URTI sx/recent travel/palpitations.     In the ED  HD stable, on room air  Labs: cbc at baseline, Na 129 (127 5/24), Cr 1.1 (baseline normal), bun 22, AST/ALT 59/60, Troponin 19, mag 1.5, probnp 2.4k (~400s 2022)   CXR: bilateral opacities (pending read)    s/p Lasix 40mg x 1 dose and mag 2gm and admitted to telemetry for further management of CHF exacerbation

## 2025-01-05 NOTE — H&P ADULT - NSHPPHYSICALEXAM_GEN_ALL_CORE
Gen: no apparent distress  Eyes: bilateral equal and reactive pupils  HENT: Normocephalic, atraumatic. External ears normal, no rhinorrhea, moist mucous membranes.   CV: irregular s1/s2, no murmur  Resp: bilateral crackles  Abd: soft, non tender  Back: No CVAT bilaterally, no midline ttp  Skin: no rash  MSK: bilateral LE swelling  Neuro: AOx3, no focal deficits

## 2025-01-05 NOTE — CHART NOTE - NSCHARTNOTEFT_GEN_A_CORE
[facility] Single Chamber AICD interrogated 1/5/25  No episodes noted  Device functioning properly    Recall as needed [id number]

## 2025-01-05 NOTE — ED PROVIDER NOTE - OBJECTIVE STATEMENT
74 yold female to ED Pmhx Cad s/p cabg, CHF ef 40%, Aicd, Htn, seizure, hypnatremia, pt presents to ED c/o sob, exertional dyspnea, cough, orthopnea and bilat lower ext swelling  x 3 days; pt compliant with meds(including spiranolactone) pt denies fever,chills, cough,

## 2025-01-05 NOTE — ED PROVIDER NOTE - PRINCIPAL DIAGNOSIS
TRANSFER - IN REPORT:    Verbal report received from Isela Smith RN on [address]  being received from [facility] for routine progression of care      Report consisted of patients Situation, Background, Assessment and   Recommendations(SBAR). Information from the following report(s) SBAR was reviewed with the receiving nurse. Opportunity for questions and clarification was provided. Assessment completed upon patients arrival to unit and care assumed. Acute on chronic systolic congestive heart failure

## 2025-01-05 NOTE — ED PROVIDER NOTE - EKG/XRAY ADDITIONAL INFORMATION
EKG shows sinus rhythm with first degree AV block, +PVCs, no STEMI.   CXR shows increased vascular markings, no PTx, no free air.

## 2025-01-05 NOTE — ED PROVIDER NOTE - CARE PLAN
1 Principal Discharge DX:	Acute on chronic systolic congestive heart failure  Secondary Diagnosis:	Hyponatremia

## 2025-01-05 NOTE — ED PROVIDER NOTE - NSICDXPASTSURGICALHX_GEN_ALL_CORE_FT
PAST SURGICAL HISTORY:  AICD (automatic cardioverter/defibrillator) present     History of left heart catheterization (LHC) MULTIPLE    S/P CABG (coronary artery bypass graft) 2011 [facility]

## 2025-01-05 NOTE — H&P ADULT - NSICDXPASTSURGICALHX_GEN_ALL_CORE_FT
PAST SURGICAL HISTORY:  AICD (automatic cardioverter/defibrillator) present     History of left heart catheterization (LHC) MULTIPLE    S/P CABG (coronary artery bypass graft) 2011 [facility]

## 2025-01-05 NOTE — H&P ADULT - NSHPLABSRESULTS_GEN_ALL_CORE
Labs: cbc at baseline, Na 129 (127 5/24), Cr 1.1 (baseline normal), bun 22, AST/ALT 59/60, Troponin 19, mag 1.5, probnp 2.4k (~400s 2022)   CXR: bilateral opacities (pending read)

## 2025-01-05 NOTE — ED ADULT NURSE NOTE - NSFALLRISKINTERV_ED_ALL_ED

## 2025-01-06 ENCOUNTER — TRANSCRIPTION ENCOUNTER (OUTPATIENT)
Age: 75
End: 2025-01-06

## 2025-01-06 LAB
ALBUMIN SERPL ELPH-MCNC: 4.4 G/DL — SIGNIFICANT CHANGE UP (ref 3.5–5.2)
ALP SERPL-CCNC: 69 U/L — SIGNIFICANT CHANGE UP (ref 30–115)
ALT FLD-CCNC: 88 U/L — HIGH (ref 0–41)
ANION GAP SERPL CALC-SCNC: 13 MMOL/L — SIGNIFICANT CHANGE UP (ref 7–14)
AST SERPL-CCNC: 70 U/L — HIGH (ref 0–41)
BASOPHILS # BLD AUTO: 0.02 K/UL — SIGNIFICANT CHANGE UP (ref 0–0.2)
BASOPHILS NFR BLD AUTO: 0.4 % — SIGNIFICANT CHANGE UP (ref 0–1)
BILIRUB SERPL-MCNC: 0.8 MG/DL — SIGNIFICANT CHANGE UP (ref 0.2–1.2)
BUN SERPL-MCNC: 25 MG/DL — HIGH (ref 10–20)
CALCIUM SERPL-MCNC: 9.8 MG/DL — SIGNIFICANT CHANGE UP (ref 8.4–10.5)
CHLORIDE SERPL-SCNC: 96 MMOL/L — LOW (ref 98–110)
CO2 SERPL-SCNC: 30 MMOL/L — SIGNIFICANT CHANGE UP (ref 17–32)
CREAT SERPL-MCNC: 0.9 MG/DL — SIGNIFICANT CHANGE UP (ref 0.7–1.5)
EGFR: 68 ML/MIN/1.73M2 — SIGNIFICANT CHANGE UP
EOSINOPHIL # BLD AUTO: 0.05 K/UL — SIGNIFICANT CHANGE UP (ref 0–0.7)
EOSINOPHIL NFR BLD AUTO: 1 % — SIGNIFICANT CHANGE UP (ref 0–8)
FOLATE SERPL-MCNC: 13 NG/ML — SIGNIFICANT CHANGE UP
GLUCOSE SERPL-MCNC: 94 MG/DL — SIGNIFICANT CHANGE UP (ref 70–99)
HAV IGM SER-ACNC: SIGNIFICANT CHANGE UP
HBV CORE IGM SER-ACNC: SIGNIFICANT CHANGE UP
HBV SURFACE AG SER-ACNC: SIGNIFICANT CHANGE UP
HCT VFR BLD CALC: 35.2 % — LOW (ref 37–47)
HCV AB S/CO SERPL IA: 0.07 S/CO — SIGNIFICANT CHANGE UP (ref 0–0.99)
HCV AB SERPL-IMP: SIGNIFICANT CHANGE UP
HGB BLD-MCNC: 11.6 G/DL — LOW (ref 12–16)
IMM GRANULOCYTES NFR BLD AUTO: 0.2 % — SIGNIFICANT CHANGE UP (ref 0.1–0.3)
LYMPHOCYTES # BLD AUTO: 1.64 K/UL — SIGNIFICANT CHANGE UP (ref 1.2–3.4)
LYMPHOCYTES # BLD AUTO: 33.8 % — SIGNIFICANT CHANGE UP (ref 20.5–51.1)
MAGNESIUM SERPL-MCNC: 1.8 MG/DL — SIGNIFICANT CHANGE UP (ref 1.8–2.4)
MCHC RBC-ENTMCNC: 33 G/DL — SIGNIFICANT CHANGE UP (ref 32–37)
MCHC RBC-ENTMCNC: 33.7 PG — HIGH (ref 27–31)
MCV RBC AUTO: 102.3 FL — HIGH (ref 81–99)
MONOCYTES # BLD AUTO: 0.61 K/UL — HIGH (ref 0.1–0.6)
MONOCYTES NFR BLD AUTO: 12.6 % — HIGH (ref 1.7–9.3)
NEUTROPHILS # BLD AUTO: 2.52 K/UL — SIGNIFICANT CHANGE UP (ref 1.4–6.5)
NEUTROPHILS NFR BLD AUTO: 52 % — SIGNIFICANT CHANGE UP (ref 42.2–75.2)
NRBC # BLD: 0 /100 WBCS — SIGNIFICANT CHANGE UP (ref 0–0)
PLATELET # BLD AUTO: 265 K/UL — SIGNIFICANT CHANGE UP (ref 130–400)
PMV BLD: 10.2 FL — SIGNIFICANT CHANGE UP (ref 7.4–10.4)
POTASSIUM SERPL-MCNC: 4.3 MMOL/L — SIGNIFICANT CHANGE UP (ref 3.5–5)
POTASSIUM SERPL-SCNC: 4.3 MMOL/L — SIGNIFICANT CHANGE UP (ref 3.5–5)
PROT SERPL-MCNC: 7.2 G/DL — SIGNIFICANT CHANGE UP (ref 6–8)
RBC # BLD: 3.44 M/UL — LOW (ref 4.2–5.4)
RBC # FLD: 14 % — SIGNIFICANT CHANGE UP (ref 11.5–14.5)
SODIUM SERPL-SCNC: 139 MMOL/L — SIGNIFICANT CHANGE UP (ref 135–146)
TSH SERPL-MCNC: 2.52 UIU/ML — SIGNIFICANT CHANGE UP (ref 0.27–4.2)
TSH SERPL-MCNC: 3.16 UIU/ML — SIGNIFICANT CHANGE UP (ref 0.27–4.2)
VIT B12 SERPL-MCNC: 717 PG/ML — SIGNIFICANT CHANGE UP (ref 232–1245)
WBC # BLD: 4.85 K/UL — SIGNIFICANT CHANGE UP (ref 4.8–10.8)
WBC # FLD AUTO: 4.85 K/UL — SIGNIFICANT CHANGE UP (ref 4.8–10.8)

## 2025-01-06 PROCEDURE — 99223 1ST HOSP IP/OBS HIGH 75: CPT

## 2025-01-06 PROCEDURE — 71045 X-RAY EXAM CHEST 1 VIEW: CPT | Mod: 26

## 2025-01-06 PROCEDURE — 99232 SBSQ HOSP IP/OBS MODERATE 35: CPT

## 2025-01-06 RX ORDER — INFLUENZA A VIRUS A/WISCONSIN/588/2019 (H1N1) RECOMBINANT HEMAGGLUTININ ANTIGEN, INFLUENZA A VIRUS A/DARWIN/6/2021 (H3N2) RECOMBINANT HEMAGGLUTININ ANTIGEN, INFLUENZA B VIRUS B/AUSTRIA/1359417/2021 RECOMBINANT HEMAGGLUTININ ANTIGEN, AND INFLUENZA B VIRUS B/PHUKET/3073/2013 RECOMBINANT HEMAGGLUTININ ANTIGEN 45; 45; 45; 45 UG/.5ML; UG/.5ML; UG/.5ML; UG/.5ML
0.5 INJECTION INTRAMUSCULAR ONCE
Refills: 0 | Status: DISCONTINUED | OUTPATIENT
Start: 2025-01-06 | End: 2025-01-07

## 2025-01-06 RX ORDER — LIDOCAINE 50 MG/G
1 OINTMENT TOPICAL ONCE
Refills: 0 | Status: COMPLETED | OUTPATIENT
Start: 2025-01-06 | End: 2025-01-06

## 2025-01-06 RX ORDER — IBUPROFEN 200 MG
200 TABLET ORAL ONCE
Refills: 0 | Status: DISCONTINUED | OUTPATIENT
Start: 2025-01-06 | End: 2025-01-06

## 2025-01-06 RX ORDER — FUROSEMIDE 20 MG
40 TABLET ORAL ONCE
Refills: 0 | Status: DISCONTINUED | OUTPATIENT
Start: 2025-01-06 | End: 2025-01-06

## 2025-01-06 RX ORDER — ACETAMINOPHEN 80 MG/.8ML
650 SOLUTION/ DROPS ORAL ONCE
Refills: 0 | Status: DISCONTINUED | OUTPATIENT
Start: 2025-01-06 | End: 2025-01-06

## 2025-01-06 RX ADMIN — SACUBITRIL AND VALSARTAN 1 TABLET(S): 24; 26 TABLET, FILM COATED ORAL at 05:45

## 2025-01-06 RX ADMIN — FAMOTIDINE 20 MILLIGRAM(S): 20 TABLET, FILM COATED ORAL at 11:35

## 2025-01-06 RX ADMIN — Medication 100 MILLIGRAM(S): at 22:16

## 2025-01-06 RX ADMIN — RANOLAZINE 1000 MILLIGRAM(S): 1000 TABLET, FILM COATED, EXTENDED RELEASE ORAL at 17:59

## 2025-01-06 RX ADMIN — Medication 81 MILLIGRAM(S): at 11:35

## 2025-01-06 RX ADMIN — Medication 1 TABLET(S): at 11:38

## 2025-01-06 RX ADMIN — Medication 30 MILLIGRAM(S): at 11:35

## 2025-01-06 RX ADMIN — SPIRONOLACTONE 25 MILLIGRAM(S): 50 TABLET ORAL at 05:44

## 2025-01-06 RX ADMIN — APIXABAN 5 MILLIGRAM(S): 5 TABLET, FILM COATED ORAL at 05:44

## 2025-01-06 RX ADMIN — SACUBITRIL AND VALSARTAN 1 TABLET(S): 24; 26 TABLET, FILM COATED ORAL at 17:59

## 2025-01-06 RX ADMIN — RANOLAZINE 1000 MILLIGRAM(S): 1000 TABLET, FILM COATED, EXTENDED RELEASE ORAL at 05:44

## 2025-01-06 RX ADMIN — Medication 40 MILLIGRAM(S): at 05:45

## 2025-01-06 RX ADMIN — LIDOCAINE 1 PATCH: 50 OINTMENT TOPICAL at 22:16

## 2025-01-06 RX ADMIN — APIXABAN 5 MILLIGRAM(S): 5 TABLET, FILM COATED ORAL at 17:59

## 2025-01-06 NOTE — PROGRESS NOTE ADULT - SUBJECTIVE AND OBJECTIVE BOX
Patient is a 74y old  Female who presents with a chief complaint of sob (01-06-25)      Pt seen and examined at bedside. No CP or SOB.      PAST MEDICAL & SURGICAL HISTORY:  CAD (coronary artery disease)    Hypercholesterolemia    HTN (hypertension)    S/P CABG (coronary artery bypass graft)  2011 [facility]    History of left heart catheterization (LHC)  MULTIPLE    AICD (automatic cardioverter/defibrillator) present        VITAL SIGNS (Last 24 hrs):  T(C): 36.3 (01-06-25 @ 00:57), Max: 36.4 (01-05-25 @ 17:04)  HR: 60 (01-06-25 @ 07:15) (58 - 72)  BP: 139/85 (01-06-25 @ 07:15) (115/94 - 139/85)  RR: 18 (01-06-25 @ 07:15) (18 - 18)  SpO2: 99% (01-06-25 @ 07:15) (95% - 99%)  Wt(kg): --  Daily     Daily     I&O's Summary      PHYSICAL EXAM:  GENERAL: NAD, well-developed  HEAD:  Atraumatic, Normocephalic  EYES: EOMI, PERRLA, conjunctiva and sclera clear  NECK: Supple, No JVD  CHEST/LUNG: Clear to auscultation bilaterally; No wheeze  HEART: Regular rate and rhythm; No murmurs, rubs, or gallops  ABDOMEN: Soft, Nontender, Nondistended; Bowel sounds present  EXTREMITIES:  2+ Peripheral Pulses, No clubbing, cyanosis, or 2+ edema  PSYCH: AAOx3  NEUROLOGY: non-focal  SKIN: No rashes or lesions    Labs Reviewed  Spoke to patient in regards to abnormal labs.    CBC Full  -  ( 06 Jan 2025 06:16 )  WBC Count : 4.85 K/uL  Hemoglobin : 11.6 g/dL  Hematocrit : 35.2 %  Platelet Count - Automated : 265 K/uL  Mean Cell Volume : 102.3 fL  Mean Cell Hemoglobin : 33.7 pg  Mean Cell Hemoglobin Concentration : 33.0 g/dL  Auto Neutrophil # : 2.52 K/uL  Auto Lymphocyte # : 1.64 K/uL  Auto Monocyte # : 0.61 K/uL  Auto Eosinophil # : 0.05 K/uL  Auto Basophil # : 0.02 K/uL  Auto Neutrophil % : 52.0 %  Auto Lymphocyte % : 33.8 %  Auto Monocyte % : 12.6 %  Auto Eosinophil % : 1.0 %  Auto Basophil % : 0.4 %    BMP:    01-06 @ 06:16    Blood Urea Nitrogen - 25  Calcium - 9.8  Carbond Dioxide - 30  Chloride - 96  Creatinine - 0.9  Glucose - 94  Potassium - 4.3  Sodium - 139      Hemoglobin A1c -     Urine Culture:        COVID Labs  CRP:      D-Dimer:            Imaging reviewed independently and reviewed read    < from: Xray Chest 1 View- PORTABLE-Urgent (Xray Chest 1 View- PORTABLE-Urgent .) (01.06.25 @ 10:22) >  IMPRESSION:    Stable mild bilateral interstitial opacities.    --- End of Report---    < end of copied text >      MEDICATIONS  (STANDING):  apixaban 5 milliGRAM(s) Oral every 12 hours  aspirin enteric coated 81 milliGRAM(s) Oral daily  calcium carbonate 1250 mG  + Vitamin D (OsCal 500 + D) 1 Tablet(s) Oral daily  famotidine    Tablet 20 milliGRAM(s) Oral daily  furosemide   Injectable 40 milliGRAM(s) IV Push once  furosemide   Injectable 40 milliGRAM(s) IV Push daily  influenza  Vaccine (HIGH DOSE) 0.5 milliLiter(s) IntraMuscular once  isosorbide   mononitrate ER Tablet (IMDUR) 30 milliGRAM(s) Oral daily  metoprolol succinate  milliGRAM(s) Oral at bedtime  ranolazine 1000 milliGRAM(s) Oral two times a day  sacubitril 24 mG/valsartan 26 mG 1 Tablet(s) Oral two times a day  spironolactone 25 milliGRAM(s) Oral daily    MEDICATIONS  (PRN):

## 2025-01-06 NOTE — DISCHARGE NOTE PROVIDER - NSDCCPCAREPLAN_GEN_ALL_CORE_FT
PRINCIPAL DISCHARGE DIAGNOSIS  Diagnosis: Acute on chronic systolic congestive heart failure  Assessment and Plan of Treatment: You were admitted for increased shortness of breath, most probably secondary to fluid overload. You were treated with IV diuresis. Your symptoms improved and you will be discharge on a water pill, in addition to your home medications. Please make sure to follow up with your doctor and take your medications as prescribed.      SECONDARY DISCHARGE DIAGNOSES  Diagnosis: Hyponatremia  Assessment and Plan of Treatment:

## 2025-01-06 NOTE — CONSULT NOTE ADULT - ATTENDING COMMENTS
Cardiologist:  Hoang    Acute on Chronic HFrEF 40%  CAD s/p CABG  Paroxysmal AF    2+ LE edema      - ECHO  - Continue IV diuresis x 2-3 doses  - Continue other home cardiac meds as prescribed  - Outpatient follow-up with Dr. Bolton (has an appointment scheduled on 1/10) Cardiologist:  Hoang    Acute on Chronic HFrEF 40%  CAD s/p CABG  Paroxysmal AF    SR today  2+ LE edema  Cr  0.9      - ECHO  - Continue IV diuresis x 2-3 doses  - Continue other home cardiac meds as prescribed  - Outpatient follow-up with Dr. Bolton (has an appointment scheduled on 1/10)

## 2025-01-06 NOTE — PROGRESS NOTE ADULT - ASSESSMENT
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 Admitted to telemetry for further management of CHF exacerbation.    #Dyspnea 2/2 Acute on chronic HFmrEF likely 2/2 non compliance to diet   #Cad s/p cabg  #ICM s/p Aicd  #pAfib on eliquis   #Htn  - HD stable, on room air  - normal wbc, Troponin 19, probnp 2.4k (~400s 2022)   - CXR: bilateral opacities (pending read)  - TTE 5/24 41%, G2DD, Mod TR, Mod MR  - Lasix 40mg iv qd  (on spironolactone 25mg qd only at home), will likely need home lasix , will give lasix 40 mg intravenous in eveningx 1   - continue with spironolactone, asa, eliquis, Entresto, metoprolol, isosorbide mono 30mg ER  - flu/covid/rsv swab neg   - daily weight, strict Is AND Os, monitor bmp  - EP for device interrogation neg   - dw pt cardiology  Dr. Grier, [facility] cardiology consult   - check IVC 1.5 and collapsible, but exam in lower ext still shows overload.       #Chronic hyponatremia, asymptomatic   - Na 129 around baseline  - monitor bmp  - urine studies     #Mild transaminitis likely 2/2 congestion  - non tender/no pain  - continue with diuresis, trend LFTs  - holding statin  - acute hepatitis panel and RUQ sono pending       #Progress Note Handoff  Pending (specify):  CHF management   Family discussion: house staff updated pt family  Disposition: can be DCed if cleared by cardiology, cardiac telemonitoring   Decision to admit the pt is based on acuity as above.  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 Admitted to telemetry for further management of CHF exacerbation.    #Dyspnea 2/2 Acute on chronic HFmrEF likely 2/2 non compliance to diet   #Cad s/p cabg  #ICM s/p Aicd  #pAfib on eliquis   #Htn  - HD stable, on room air  - normal wbc, Troponin 19, probnp 2.4k (~400s 2022)   - CXR: bilateral opacities (pending read)  - TTE 5/24 41%, G2DD, Mod TR, Mod MR  - Lasix 40mg iv qd  (on spironolactone 25mg qd only at home), will likely need home lasix , will give lasix 40 mg intravenous in evening x 1   - continue with spironolactone, asa, eliquis, Entresto, metoprolol, isosorbide mono 30mg ER  - flu/covid/rsv swab neg   - daily weight, strict Is AND Os, monitor bmp  - EP for device interrogation neg   - dw pt cardiology  Dr. Grier, [facility] cardiology consult   - check IVC 1.5 and collapsible, but exam in lower ext still shows overload.       #Chronic hyponatremia, asymptomatic   - Na 129 around baseline  - monitor bmp  - urine studies   - improving post Lasix, possible hypervolemic hyponatremia?    #Mild transaminitis likely 2/2 congestion  - non tender/no pain  - continue with diuresis, trend LFTs  - holding statin  - acute hepatitis panel and RUQ sono pending       #Progress Note Handoff  Pending (specify):  CHF management   Family discussion: house staff updated pt family  Disposition: can be DCed if cleared by cardiology, cardiac telemonitoring   Decision to admit the pt is based on acuity as above.

## 2025-01-06 NOTE — DISCHARGE NOTE PROVIDER - NSDCFUSCHEDAPPT_GEN_ALL_CORE_FT
Villa Bolton  [facility]  CARDIOLOGY [address]  Scheduled Appointment: 01/10/2025    Vivian Eli  [facility]  ELECTROPH [address]  Scheduled Appointment: 01/30/2025    Dale Griggs  [facility] PreAdmits  Scheduled Appointment: 03/13/2025    Dale Griggs  [facility]  INTMED  [address]  Scheduled Appointment: 03/13/2025    Yaakov Scott  [facility]  ELECTROPH [address]  Scheduled Appointment: 03/20/2025

## 2025-01-06 NOTE — DISCHARGE NOTE PROVIDER - HOSPITAL COURSE
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 Per patient her symptoms were slowly getting worse over last 2-3 months but today she had bp 160s/90s with sob at rest prompting her to come to the ED. Chronic cough but better per pt. Chronic chest pain +ve. Compliant with meds and diet per patient. Denies fevers/chills/sick contacts/URTI sx/recent travel/palpitations.     In the ED  HD stable, on room air  Labs: cbc at baseline, Na 129 (127 5/24), Cr 1.1 (baseline normal), bun 22, AST/ALT 59/60, Troponin 19, mag 1.5, probnp 2.4k (~400s 2022)   CXR: bilateral opacities (pending read)    s/p Lasix 40mg x 1 dose and mag 2gm and admitted to telemetry for further management of CHF exacerbation (05 Jan 2025 03:14)    #Dyspnea 2/2 Acute on chronic HFmrEF likely 2/2 non compliance to diet   #Cad s/p cabg  #ICM s/p Aicd  #pAfib on eliquis   #Htn  - HD stable, on room air  - normal wbc, Troponin 19, probnp 2.4k (~400s 2022)   - CXR: bilateral opacities  - TTE 5/24 41%, G2DD, Mod TR, Mod MR  - Was treated with IV lasix, d/c on Lasix 40 PO QD and c/w home spironolactone   - continue with spironolactone, asa, eliquis, Entresto, metoprolol, isosorbide mono 30mg ER  - flu/covid/rsv swab neg   - EP for device interrogation neg       #Chronic hyponatremia, asymptomatic   - Na 129 around baseline     #Mild transaminitis likely 2/2 congestion  - non tender/no pain  - acute hepatitis panel pending results and RUQ sono negative   HPI:  74 yold female to ED Pmhx Cad s/p cabg, 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 Per patient her symptoms were slowly getting worse over last 2-3 months but today she had bp 160s/90s with sob at rest prompting her to come to the ED. Chronic cough but better per pt. Chronic chest pain +ve. Compliant with meds and diet per patient. Denies fevers/chills/sick contacts/URTI sx/recent travel/palpitations.       #Dyspnea 2/2 Acute on chronic HFmrEF likely 2/2 non compliance to diet   #Cad s/p cabg  #ICM s/p Aicd  #pAfib on eliquis   #Htn  - HD stable, on room air  - normal wbc, Troponin 19, probnp 2.4k (~400s 2022)   - CXR: bilateral opacities  - TTE 5/24 41%, G2DD, Mod TR, Mod MR  - Was treated with IV lasix, d/c on Lasix 40 PO QD and c/w home spironolactone   - continue with spironolactone, asa, eliquis, Entresto, metoprolol, isosorbide mono 30mg ER  - flu/covid/rsv swab neg   - EP for device interrogation neg       #Chronic hyponatremia, asymptomatic   - Na 129 around baseline     #Mild transaminitis likely 2/2 congestion  - non tender/no pain  - acute hepatitis panel pending results and RUQ sono negative    Discussion of discharge plan of care, including discharge diagnoses, medication reconciliation, and follow-ups was conducted with Dr. Stauffer on 01/07/2025, and discharge was approved.   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 Per patient her symptoms were slowly getting worse over last 2-3 months but today she had bp 160s/90s with sob at rest prompting her to come to the ED. Chronic cough but better per pt. Chronic chest pain +ve. Compliant with meds and diet per patient. Denies fevers/chills/sick contacts/URTI sx/recent travel/palpitations.     Patient treated for Acute on Chronic Systolic Heart Failure with IV diuresis, medically stable for discharge. On room air and saturating well, exam is warm and dry and euvolemic. GDMT resumed and additional Lasix PO added to regimen.    #Dyspnea 2/2 Acute on chronic HFmrEF likely 2/2 non compliance to diet   #Cad s/p cabg  #ICM s/p Aicd  #pAfib on eliquis   #Htn  - HD stable, on room air  - TTE 5/24 41%, G2DD, Mod TR, Mod MR  - Was treated with IV lasix, d/c on Lasix 40 PO QD and c/w home spironolactone   - continue with spironolactone, asa, eliquis, Entresto, metoprolol, isosorbide mono 30mg ER  - flu/covid/rsv swab neg   - EP for device interrogation neg   - Discharged on PO Lasix 40mg    #Chronic Hyponatremia  - Na 129    Discussion of discharge plan of care, including discharge diagnoses, medication reconciliation, and follow-ups was conducted with Dr. Stauffer on 01/07/2025, and discharge was approved.   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 Per patient her symptoms were slowly getting worse over last 2-3 months but today she had bp 160s/90s with sob at rest prompting her to come to the ED. Chronic cough but better per pt. Chronic chest pain +ve. Compliant with meds and diet per patient. Denies fevers/chills/sick contacts/URTI sx/recent travel/palpitations.     Patient treated for Acute on Chronic Systolic Heart Failure with IV diuresis, medically stable for discharge. On room air and saturating well, exam is warm and dry and euvolemic. GDMT resumed and additional Lasix PO added to regimen.    Acute on Chronic HFmrEF(improved LVEF was 30%):   Ischemic cardiomyopathy s/p AICD:   Patient with SOB and leg edema, Pro-BNP 2400  CXR showed mild bilateral interstitial infiltrates.   Echo is May 2024 showed LVEF 41, G2DD, mod TR, mod MR.   S/p Lasix IV, symptoms improved, lungs are clear, leg edema improved, switch to Lasix 40mg po daily.   Seen by Cardiology recommended Aldactone 25mg po daily, Toprol 25mg po daily, Entresto 24/26mg BID   Can add Farxiga outpatient.   Advised with low sodium diet, fluid restriction 1.5L/day. Cardiology follow up outpatient.     CAD s/p CABG:   Stable, no chest pain, Troponin 19>18 stable.   EKG showed Normal Sinus Rhythm, PVCs.   Continue ASA, Metoprolol, Lipitor, Imdur and Ranexa.     Paroxysmal Atrial Fibrillation:   Sinus rhythm, Continue Metoprolol and Eliquis.       Mild Hyponatremia: due to CHF.   Mild transaminitis likely due to congestive hepatopathy.   LFTs stable, hepatitis panel negative.   Abdomen US normal.     Discussion of discharge plan of care, including discharge diagnoses, medication reconciliation, and follow-ups was conducted with Dr. Stauffer on 01/07/2025, and discharge was approved.

## 2025-01-06 NOTE — CONSULT NOTE ADULT - SUBJECTIVE AND OBJECTIVE BOX
Outpt cardiologist: Dr. 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 Per patient her symptoms were slowly getting worse over last 2-3 months but today she had bp 160s/90s with sob at rest prompting her to come to the ED. Chronic cough but better per pt. Chronic chest pain +ve. Compliant with meds and diet per patient. Denies fevers/chills/sick contacts/URTI sx/recent travel/palpitations.     In the ED  HD stable, on room air  Labs: cbc at baseline, Na 129 (127 ), Cr 1.1 (baseline normal), bun 22, AST/ALT 59/60, Troponin 19, mag 1.5, probnp 2.4k (~400s )   CXR: bilateral opacities (pending read)    s/p Lasix 40mg x 1 dose and mag 2gm and admitted to telemetry for further management of CHF exacerbation (2025 03:14)      ---  CARDIOLOGY H&P:         PAST MEDICAL & SURGICAL HISTORY  CAD (coronary artery disease)    Hypercholesterolemia    HTN (hypertension)    S/P CABG (coronary artery bypass graft)   [facility]    History of left heart catheterization (LHC)  MULTIPLE    AICD (automatic cardioverter/defibrillator) present        FAMILY HISTORY:  FAMILY HISTORY:      SOCIAL HISTORY:  Social History:      ALLERGIES:  No Known Allergies      MEDICATIONS:  apixaban 5 milliGRAM(s) Oral every 12 hours  aspirin enteric coated 81 milliGRAM(s) Oral daily  calcium carbonate 1250 mG  + Vitamin D (OsCal 500 + D) 1 Tablet(s) Oral daily  famotidine    Tablet 20 milliGRAM(s) Oral daily  furosemide   Injectable 40 milliGRAM(s) IV Push daily  furosemide   Injectable 40 milliGRAM(s) IV Push once  isosorbide   mononitrate ER Tablet (IMDUR) 30 milliGRAM(s) Oral daily  metoprolol succinate  milliGRAM(s) Oral at bedtime  ranolazine 1000 milliGRAM(s) Oral two times a day  sacubitril 24 mG/valsartan 26 mG 1 Tablet(s) Oral two times a day  spironolactone 25 milliGRAM(s) Oral daily    PRN:      HOME MEDICATIONS:  Home Medications:  Aspir 81 oral delayed release tablet: 1 tab(s) orally once a day (15 May 2024 17:06)  atorvastatin 40 mg oral tablet: 1 tab(s) orally once a day (at bedtime) (2025 03:41)  calcium + Vitamin D: once daily (15 May 2024 17:04)  Entresto 24 mg-26 mg oral tablet: 1 tab(s) orally 2 times a day (15 May 2024 17:06)  famotidine 20 mg oral tablet: 1 tab(s) orally once a day (15 May 2024 17:05)  metoprolol succinate 100 mg oral tablet, extended release: 1 tab(s) orally once a day (15 May 2024 17:06)  Ranexa 1000 mg oral tablet, extended release: 1 tab(s) orally 2 times a day (15 May 2024 17:06)  spironolactone 25 mg oral tablet: 1 tab(s) orally once a day (15 May 2024 17:06)      VITALS:   T(F): 97.3 ( @ 00:57), Max: 98.1 ( @ 08:32)  HR: 60 ( @ 07:15) (58 - 97)  BP: 139/85 ( @ 07:15) (115/94 - 163/92)  BP(mean): 102 ( @ 05:45) (102 - 102)  RR: 18 ( @ 07:15) (16 - 18)  SpO2: 99% ( @ 07:15) (95% - 99%)    I&O's Summary      REVIEW OF SYSTEMS:  CONSTITUTIONAL: No weakness, fevers or chills  HEENT: No visual changes, neck/ear pain  RESPIRATORY: No cough, sob  CARDIOVASCULAR: See HPI  GASTROINTESTINAL: No abdominal pain. No nausea, vomiting, diarrhea   GENITOURINARY: No dysuria, frequency or hematuria  NEUROLOGICAL: No new focal deficits  SKIN: No new rashes    PHYSICAL EXAM:  General: Not in distress.  Non-toxic appearing.   HEENT: EOMI  Cardio: regular, S1, S2, no murmur  Pulm: B/L BS.  No wheezing / crackles / rales  Abdomen: Soft, non-tender, non-distended. Normoactive bowel sounds  Extremities: No edema b/l le  Neuro: A&O x3. No focal deficits    LABS:                        11.6   4.85  )-----------( 265      ( 2025 06:16 )             35.2         139  |  96[L]  |  25[H]  ----------------------------<  94  4.3   |  30  |  0.9    Ca    9.8      2025 06:16  Mg     1.8         TPro  7.2  /  Alb  4.4  /  TBili  0.8  /  DBili  x   /  AST  70[H]  /  ALT  88[H]  /  AlkPhos  69                Troponin trend:            RADIOLOGY:  -CXR (2025): Findings compatible with CHF/fluid overload in the correct clinical   setting.  -TTE (2024): EF 41%, G2DD, Mild to Moderate MR, Moderate TR, LA enlargement.   -CCTA:  -STRESS TEST:  -CATHETERIZATION:  -OTHER:  EC Lead ECG:   Ventricular Rate 62 BPM    Atrial Rate 62 BPM    P-R Interval 272 ms    QRS Duration 78 ms    Q-T Interval 426 ms    QTC Calculation(Bazett) 432 ms    P Axis 41 degrees    R Axis -19 degrees    T Axis 57 degrees    Diagnosis Line Sinus rhythm with 1st degree A-V block with occasional Premature ventricular  complexes  Minimal voltage criteria for LVH, may be normal variant ( R in aVL )  Inferior infarct (cited on or before 13-MAY-2013)  Anterior infarct , age undetermined  Abnormal ECG    Confirmed by Raul Gates ([id number]) on 2025 9:20:53 AM ( @ 23:54)      TELEMETRY EVENTS: no events on telemetry   Outpt cardiologist: Dr. 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 Per patient her symptoms were slowly getting worse over last 2-3 months but today she had bp 160s/90s with sob at rest prompting her to come to the ED. Chronic cough but better per pt. Chronic chest pain +ve. Compliant with meds and diet per patient. Denies fevers/chills/sick contacts/URTI sx/recent travel/palpitations.     In the ED  HD stable, on room air  Labs: cbc at baseline, Na 129 (127 ), Cr 1.1 (baseline normal), bun 22, AST/ALT 59/60, Troponin 19, mag 1.5, probnp 2.4k (~400s )   CXR: bilateral opacities (pending read)    s/p Lasix 40mg x 1 dose and mag 2gm and admitted to telemetry for further management of CHF exacerbation (2025 03:14)      ---  CARDIOLOGY H&P:         PAST MEDICAL & SURGICAL HISTORY  CAD (coronary artery disease)    Hypercholesterolemia    HTN (hypertension)    S/P CABG (coronary artery bypass graft)   [facility]    History of left heart catheterization (LHC)  MULTIPLE    AICD (automatic cardioverter/defibrillator) present        FAMILY HISTORY:  FAMILY HISTORY:      SOCIAL HISTORY:  Social History:      ALLERGIES:  No Known Allergies      MEDICATIONS:  apixaban 5 milliGRAM(s) Oral every 12 hours  aspirin enteric coated 81 milliGRAM(s) Oral daily  calcium carbonate 1250 mG  + Vitamin D (OsCal 500 + D) 1 Tablet(s) Oral daily  famotidine    Tablet 20 milliGRAM(s) Oral daily  furosemide   Injectable 40 milliGRAM(s) IV Push daily  furosemide   Injectable 40 milliGRAM(s) IV Push once  isosorbide   mononitrate ER Tablet (IMDUR) 30 milliGRAM(s) Oral daily  metoprolol succinate  milliGRAM(s) Oral at bedtime  ranolazine 1000 milliGRAM(s) Oral two times a day  sacubitril 24 mG/valsartan 26 mG 1 Tablet(s) Oral two times a day  spironolactone 25 milliGRAM(s) Oral daily    PRN:      HOME MEDICATIONS:  Home Medications:  Aspir 81 oral delayed release tablet: 1 tab(s) orally once a day (15 May 2024 17:06)  atorvastatin 40 mg oral tablet: 1 tab(s) orally once a day (at bedtime) (2025 03:41)  calcium + Vitamin D: once daily (15 May 2024 17:04)  Entresto 24 mg-26 mg oral tablet: 1 tab(s) orally 2 times a day (15 May 2024 17:06)  famotidine 20 mg oral tablet: 1 tab(s) orally once a day (15 May 2024 17:05)  metoprolol succinate 100 mg oral tablet, extended release: 1 tab(s) orally once a day (15 May 2024 17:06)  Ranexa 1000 mg oral tablet, extended release: 1 tab(s) orally 2 times a day (15 May 2024 17:06)  spironolactone 25 mg oral tablet: 1 tab(s) orally once a day (15 May 2024 17:06)      VITALS:   T(F): 97.3 ( @ 00:57), Max: 98.1 ( @ 08:32)  HR: 60 ( @ 07:15) (58 - 97)  BP: 139/85 ( @ 07:15) (115/94 - 163/92)  BP(mean): 102 ( @ 05:45) (102 - 102)  RR: 18 ( @ 07:15) (16 - 18)  SpO2: 99% ( @ 07:15) (95% - 99%)    I&O's Summary      REVIEW OF SYSTEMS:  CONSTITUTIONAL: No weakness, fevers or chills  HEENT: No visual changes, neck/ear pain  RESPIRATORY: No cough, sob  CARDIOVASCULAR: See HPI  GASTROINTESTINAL: No abdominal pain. No nausea, vomiting, diarrhea   GENITOURINARY: No dysuria, frequency or hematuria  NEUROLOGICAL: No new focal deficits  SKIN: No new rashes    PHYSICAL EXAM:  General: Not in distress.  Non-toxic appearing.   HEENT: EOMI  Cardio: regular, S1, S2, no murmur  Pulm: B/L BS.  No wheezing / crackles / rales  Abdomen: Soft, non-tender, non-distended. Normoactive bowel sounds  Extremities: No edema b/l le  Neuro: A&O x3. No focal deficits    LABS:                        11.6   4.85  )-----------( 265      ( 2025 06:16 )             35.2         139  |  96[L]  |  25[H]  ----------------------------<  94  4.3   |  30  |  0.9    Ca    9.8      2025 06:16  Mg     1.8         TPro  7.2  /  Alb  4.4  /  TBili  0.8  /  DBili  x   /  AST  70[H]  /  ALT  88[H]  /  AlkPhos  69                Troponin trend:            RADIOLOGY:  -CXR (2025): Findings compatible with CHF/fluid overload in the correct clinical   setting.  -TTE (2024): EF 41%, G2DD, Mild to Moderate MR, Moderate TR, LA enlargement.     -CATHETERIZATION (2020): IMPRESSIONS:    1. 3-vessel coronary artery disease.  2. Proximal LAD occlusion with patent LIMA-to-LAD and SVG-to-2nd Diagonal.  3. Subtotal 2nd Diagonal occlusion distal to SVG anastomosis with faint  collateral filling.  4. High OM1 occlusion.  5. Known SVG-to-RCA and SVG Lmtdamhuro-zi-Eftae / OM2 occlusions.  6. Patent RCA stent.      EC Lead ECG:   Ventricular Rate 62 BPM    Atrial Rate 62 BPM    P-R Interval 272 ms    QRS Duration 78 ms    Q-T Interval 426 ms    QTC Calculation(Bazett) 432 ms    P Axis 41 degrees    R Axis -19 degrees    T Axis 57 degrees    Diagnosis Line Sinus rhythm with 1st degree A-V block with occasional Premature ventricular  complexes  Minimal voltage criteria for LVH, may be normal variant ( R in aVL )  Inferior infarct (cited on or before 13-MAY-2013)  Anterior infarct , age undetermined  Abnormal ECG    Confirmed by Raul Gates ([id number]) on 2025 9:20:53 AM ( @ 23:54)      TELEMETRY EVENTS: no events on telemetry   Outpt cardiologist: Dr. 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 Per patient her symptoms were slowly getting worse over last 2-3 months but today she had bp 160s/90s with sob at rest prompting her to come to the ED. Chronic cough but better per pt. Chronic chest pain +ve. Compliant with meds and diet per patient. Denies fevers/chills/sick contacts/URTI sx/recent travel/palpitations.     In the ED  HD stable, on room air  Labs: cbc at baseline, Na 129 (127 ), Cr 1.1 (baseline normal), bun 22, AST/ALT 59/60, Troponin 19, mag 1.5, probnp 2.4k (~400s )   CXR: bilateral opacities (pending read)    s/p Lasix 40mg x 1 dose and mag 2gm and admitted to telemetry for further management of CHF exacerbation (2025 03:14)      ---  CARDIOLOGY H&P:   [facility]  used.   74 year old female with PMH of CAD s/p CABG, HFmrEF EF 41% (2024), ICM s/p AICD, HTN, Seizure, Chronic hyponatremia, pAFib on Eliquis here for worsening orthopnea, LE swelling and exertional dyspnea. She endorses worsening orthopnea for 1-2 years that acutely worsened over past 2 weeks. She also endorses intermittent chest pain for past 3-5 years that occurs during exertion and at rest without recent changes in frequency or duration and relieved with nitroglycerin spray. Patient endorses SOB for the same duration as her orthopnea. She endorses stringent compliance with her medications and tries to maintain a low fluid/low sodium diet. She denies any fever, chills, palpitations, dizziness, syncope.       PAST MEDICAL & SURGICAL HISTORY  CAD (coronary artery disease)    Hypercholesterolemia    HTN (hypertension)    S/P CABG (coronary artery bypass graft)   [facility]    History of left heart catheterization (LHC)  MULTIPLE    AICD (automatic cardioverter/defibrillator) present        FAMILY HISTORY:  FAMILY HISTORY:      SOCIAL HISTORY:  Social History:      ALLERGIES:  No Known Allergies      MEDICATIONS:  apixaban 5 milliGRAM(s) Oral every 12 hours  aspirin enteric coated 81 milliGRAM(s) Oral daily  calcium carbonate 1250 mG  + Vitamin D (OsCal 500 + D) 1 Tablet(s) Oral daily  famotidine    Tablet 20 milliGRAM(s) Oral daily  furosemide   Injectable 40 milliGRAM(s) IV Push daily  furosemide   Injectable 40 milliGRAM(s) IV Push once  isosorbide   mononitrate ER Tablet (IMDUR) 30 milliGRAM(s) Oral daily  metoprolol succinate  milliGRAM(s) Oral at bedtime  ranolazine 1000 milliGRAM(s) Oral two times a day  sacubitril 24 mG/valsartan 26 mG 1 Tablet(s) Oral two times a day  spironolactone 25 milliGRAM(s) Oral daily    PRN:      HOME MEDICATIONS:  Home Medications:  Aspir 81 oral delayed release tablet: 1 tab(s) orally once a day (15 May 2024 17:06)  atorvastatin 40 mg oral tablet: 1 tab(s) orally once a day (at bedtime) (2025 03:41)  calcium + Vitamin D: once daily (15 May 2024 17:04)  Entresto 24 mg-26 mg oral tablet: 1 tab(s) orally 2 times a day (15 May 2024 17:06)  famotidine 20 mg oral tablet: 1 tab(s) orally once a day (15 May 2024 17:05)  metoprolol succinate 100 mg oral tablet, extended release: 1 tab(s) orally once a day (15 May 2024 17:06)  Ranexa 1000 mg oral tablet, extended release: 1 tab(s) orally 2 times a day (15 May 2024 17:06)  spironolactone 25 mg oral tablet: 1 tab(s) orally once a day (15 May 2024 17:06)      VITALS:   T(F): 97.3 ( @ 00:57), Max: 98.1 ( @ 08:32)  HR: 60 ( @ 07:15) (58 - 97)  BP: 139/85 ( @ 07:15) (115/94 - 163/92)  BP(mean): 102 ( @ 05:45) (102 - 102)  RR: 18 ( @ 07:15) (16 - 18)  SpO2: 99% ( @ 07:15) (95% - 99%)    I&O's Summary      REVIEW OF SYSTEMS:  CONSTITUTIONAL: No weakness, fevers or chills  HEENT: No visual changes, neck/ear pain  RESPIRATORY: No cough, sob  CARDIOVASCULAR: See HPI  GASTROINTESTINAL: No abdominal pain. No nausea, vomiting, diarrhea   GENITOURINARY: No dysuria, frequency or hematuria  NEUROLOGICAL: No new focal deficits  SKIN: No new rashes    PHYSICAL EXAM:  General: Not in distress.  Non-toxic appearing.   HEENT: EOMI  Cardio: regular, S1, S2, no murmur  Pulm: B/L BS.  No wheezing / crackles / rales  Abdomen: Soft, non-tender, non-distended. Normoactive bowel sounds  Extremities: No edema b/l le  Neuro: A&O x3. No focal deficits    LABS:                        11.6   4.85  )-----------( 265      ( 2025 06:16 )             35.2         139  |  96[L]  |  25[H]  ----------------------------<  94  4.3   |  30  |  0.9    Ca    9.8      2025 06:16  Mg     1.8         TPro  7.2  /  Alb  4.4  /  TBili  0.8  /  DBili  x   /  AST  70[H]  /  ALT  88[H]  /  AlkPhos  69                Troponin trend:            RADIOLOGY:  -CXR (2025): Findings compatible with CHF/fluid overload in the correct clinical   setting.  -TTE (2024): EF 41%, G2DD, Mild to Moderate MR, Moderate TR, LA enlargement.     -CATHETERIZATION (2020): IMPRESSIONS:    1. 3-vessel coronary artery disease.  2. Proximal LAD occlusion with patent LIMA-to-LAD and SVG-to-2nd Diagonal.  3. Subtotal 2nd Diagonal occlusion distal to SVG anastomosis with faint  collateral filling.  4. High OM1 occlusion.  5. Known SVG-to-RCA and SVG Zvyrsgdgzk-wo-Tikye / OM2 occlusions.  6. Patent RCA stent.      EC Lead ECG:   Ventricular Rate 62 BPM    Atrial Rate 62 BPM    P-R Interval 272 ms    QRS Duration 78 ms    Q-T Interval 426 ms    QTC Calculation(Bazett) 432 ms    P Axis 41 degrees    R Axis -19 degrees    T Axis 57 degrees    Diagnosis Line Sinus rhythm with 1st degree A-V block with occasional Premature ventricular  complexes  Minimal voltage criteria for LVH, may be normal variant ( R in aVL )  Inferior infarct (cited on or before 13-MAY-2013)  Anterior infarct , age undetermined  Abnormal ECG    Confirmed by Raul Gates ([id number]) on 2025 9:20:53 AM ( @ 23:54)      TELEMETRY EVENTS: no events on telemetry   Outpt cardiologist: Dr. 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 Per patient her symptoms were slowly getting worse over last 2-3 months but today she had bp 160s/90s with sob at rest prompting her to come to the ED. Chronic cough but better per pt. Chronic chest pain +ve. Compliant with meds and diet per patient. Denies fevers/chills/sick contacts/URTI sx/recent travel/palpitations.     In the ED  HD stable, on room air  Labs: cbc at baseline, Na 129 (127 ), Cr 1.1 (baseline normal), bun 22, AST/ALT 59/60, Troponin 19, mag 1.5, probnp 2.4k (~400s )   CXR: bilateral opacities (pending read)    s/p Lasix 40mg x 1 dose and mag 2gm and admitted to telemetry for further management of CHF exacerbation (2025 03:14)      ---  CARDIOLOGY H&P:   [facility]  used.   74 year old female with PMH of CAD s/p CABG, HFmrEF EF 41% (2024), ICM s/p AICD, HTN, Seizure, Chronic hyponatremia, pAFib on Eliquis here for worsening orthopnea, LE swelling and exertional dyspnea. She endorses worsening orthopnea for 1-2 years that acutely worsened over past 2 weeks. She also endorses intermittent chest pain for past 3-5 years that occurs during exertion and at rest without recent changes in frequency or duration and relieved with nitroglycerin spray. Patient endorses SOB for the same duration as her orthopnea. She endorses stringent compliance with her medications and tries to maintain a low fluid/low sodium diet. She denies any fever, chills, palpitations, dizziness, syncope.       PAST MEDICAL & SURGICAL HISTORY  CAD (coronary artery disease)    Hypercholesterolemia    HTN (hypertension)    S/P CABG (coronary artery bypass graft)   [facility]    History of left heart catheterization (LHC)  MULTIPLE    AICD (automatic cardioverter/defibrillator) present        FAMILY HISTORY:  FAMILY HISTORY:      SOCIAL HISTORY:  Social History:      ALLERGIES:  No Known Allergies      MEDICATIONS:  apixaban 5 milliGRAM(s) Oral every 12 hours  aspirin enteric coated 81 milliGRAM(s) Oral daily  calcium carbonate 1250 mG  + Vitamin D (OsCal 500 + D) 1 Tablet(s) Oral daily  famotidine    Tablet 20 milliGRAM(s) Oral daily  furosemide   Injectable 40 milliGRAM(s) IV Push daily  furosemide   Injectable 40 milliGRAM(s) IV Push once  isosorbide   mononitrate ER Tablet (IMDUR) 30 milliGRAM(s) Oral daily  metoprolol succinate  milliGRAM(s) Oral at bedtime  ranolazine 1000 milliGRAM(s) Oral two times a day  sacubitril 24 mG/valsartan 26 mG 1 Tablet(s) Oral two times a day  spironolactone 25 milliGRAM(s) Oral daily    PRN:      HOME MEDICATIONS:  Home Medications:  Aspir 81 oral delayed release tablet: 1 tab(s) orally once a day (15 May 2024 17:06)  atorvastatin 40 mg oral tablet: 1 tab(s) orally once a day (at bedtime) (2025 03:41)  calcium + Vitamin D: once daily (15 May 2024 17:04)  Entresto 24 mg-26 mg oral tablet: 1 tab(s) orally 2 times a day (15 May 2024 17:06)  famotidine 20 mg oral tablet: 1 tab(s) orally once a day (15 May 2024 17:05)  metoprolol succinate 100 mg oral tablet, extended release: 1 tab(s) orally once a day (15 May 2024 17:06)  Ranexa 1000 mg oral tablet, extended release: 1 tab(s) orally 2 times a day (15 May 2024 17:06)  spironolactone 25 mg oral tablet: 1 tab(s) orally once a day (15 May 2024 17:06)      VITALS:   T(F): 97.3 ( @ 00:57), Max: 98.1 ( @ 08:32)  HR: 60 ( @ 07:15) (58 - 97)  BP: 139/85 ( @ 07:15) (115/94 - 163/92)  BP(mean): 102 ( @ 05:45) (102 - 102)  RR: 18 ( @ 07:15) (16 - 18)  SpO2: 99% ( @ 07:15) (95% - 99%)    I&O's Summary      REVIEW OF SYSTEMS:  CONSTITUTIONAL: No weakness, fevers or chills  HEENT: No visual changes, neck/ear pain  RESPIRATORY: No cough, sob  CARDIOVASCULAR: See HPI  GASTROINTESTINAL: No abdominal pain. No nausea, vomiting, diarrhea   GENITOURINARY: No dysuria, frequency or hematuria  NEUROLOGICAL: No new focal deficits  SKIN: No new rashes    PHYSICAL EXAM:  General: Not in distress.  Non-toxic appearing.   HEENT: EOMI  Cardio: regular, S1, S2, no murmur  Pulm: B/L BS.  Diminished breath sounds at bases, L>>R.   Abdomen: Soft, non-tender, non-distended. Normoactive bowel sounds  Extremities: No edema b/l le  Neuro: A&O x3. No focal deficits    LABS:                        11.6   4.85  )-----------( 265      ( 2025 06:16 )             35.2         139  |  96[L]  |  25[H]  ----------------------------<  94  4.3   |  30  |  0.9    Ca    9.8      2025 06:16  Mg     1.8         TPro  7.2  /  Alb  4.4  /  TBili  0.8  /  DBili  x   /  AST  70[H]  /  ALT  88[H]  /  AlkPhos  69                Troponin trend:            RADIOLOGY:  -CXR (2025): Findings compatible with CHF/fluid overload in the correct clinical   setting.  -TTE (2024): EF 41%, G2DD, Mild to Moderate MR, Moderate TR, LA enlargement.     -CATHETERIZATION (2020): IMPRESSIONS:    1. 3-vessel coronary artery disease.  2. Proximal LAD occlusion with patent LIMA-to-LAD and SVG-to-2nd Diagonal.  3. Subtotal 2nd Diagonal occlusion distal to SVG anastomosis with faint  collateral filling.  4. High OM1 occlusion.  5. Known SVG-to-RCA and SVG Wkssoyhfwf-gi-Zsdxa / OM2 occlusions.  6. Patent RCA stent.      EC Lead ECG:   Ventricular Rate 62 BPM    Atrial Rate 62 BPM    P-R Interval 272 ms    QRS Duration 78 ms    Q-T Interval 426 ms    QTC Calculation(Bazett) 432 ms    P Axis 41 degrees    R Axis -19 degrees    T Axis 57 degrees    Diagnosis Line Sinus rhythm with 1st degree A-V block with occasional Premature ventricular  complexes  Minimal voltage criteria for LVH, may be normal variant ( R in aVL )  Inferior infarct (cited on or before 13-MAY-2013)  Anterior infarct , age undetermined  Abnormal ECG    Confirmed by Raul Gates ([id number]) on 2025 9:20:53 AM ( @ 23:54)      TELEMETRY EVENTS: no events on telemetry   Outpt cardiologist: Dr. 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 Per patient her symptoms were slowly getting worse over last 2-3 months but today she had bp 160s/90s with sob at rest prompting her to come to the ED. Chronic cough but better per pt. Chronic chest pain +ve. Compliant with meds and diet per patient. Denies fevers/chills/sick contacts/URTI sx/recent travel/palpitations.     In the ED  HD stable, on room air  Labs: cbc at baseline, Na 129 (127 ), Cr 1.1 (baseline normal), bun 22, AST/ALT 59/60, Troponin 19, mag 1.5, probnp 2.4k (~400s )   CXR: bilateral opacities (pending read)    s/p Lasix 40mg x 1 dose and mag 2gm and admitted to telemetry for further management of CHF exacerbation (2025 03:14)      ---  CARDIOLOGY H&P:   [facility]  used.   74 year old female with PMH of CAD s/p CABG, HFmrEF EF 41% (2024), ICM s/p AICD, HTN, Seizure, Chronic hyponatremia, pAFib on Eliquis here for worsening orthopnea, LE swelling and exertional dyspnea. She endorses worsening orthopnea for 1-2 years that acutely worsened over past 2 weeks. She also endorses intermittent chest pain for past 3-5 years that occurs during exertion and at rest without recent changes in frequency or duration and relieved with nitroglycerin spray. Patient endorses SOB for the same duration as her orthopnea. She endorses stringent compliance with her medications and tries to maintain a low fluid/low sodium diet. She denies any fever, chills, palpitations, dizziness, syncope.       PAST MEDICAL & SURGICAL HISTORY  CAD (coronary artery disease)    Hypercholesterolemia    HTN (hypertension)    S/P CABG (coronary artery bypass graft)   [facility]    History of left heart catheterization (LHC)  MULTIPLE    AICD (automatic cardioverter/defibrillator) present      ALLERGIES:  No Known Allergies      MEDICATIONS:  apixaban 5 milliGRAM(s) Oral every 12 hours  aspirin enteric coated 81 milliGRAM(s) Oral daily  calcium carbonate 1250 mG  + Vitamin D (OsCal 500 + D) 1 Tablet(s) Oral daily  famotidine    Tablet 20 milliGRAM(s) Oral daily  furosemide   Injectable 40 milliGRAM(s) IV Push daily  furosemide   Injectable 40 milliGRAM(s) IV Push once  isosorbide   mononitrate ER Tablet (IMDUR) 30 milliGRAM(s) Oral daily  metoprolol succinate  milliGRAM(s) Oral at bedtime  ranolazine 1000 milliGRAM(s) Oral two times a day  sacubitril 24 mG/valsartan 26 mG 1 Tablet(s) Oral two times a day  spironolactone 25 milliGRAM(s) Oral daily      HOME MEDICATIONS:  Home Medications:  Aspir 81 oral delayed release tablet: 1 tab(s) orally once a day (15 May 2024 17:06)  atorvastatin 40 mg oral tablet: 1 tab(s) orally once a day (at bedtime) (2025 03:41)  calcium + Vitamin D: once daily (15 May 2024 17:04)  Entresto 24 mg-26 mg oral tablet: 1 tab(s) orally 2 times a day (15 May 2024 17:06)  famotidine 20 mg oral tablet: 1 tab(s) orally once a day (15 May 2024 17:05)  metoprolol succinate 100 mg oral tablet, extended release: 1 tab(s) orally once a day (15 May 2024 17:06)  Ranexa 1000 mg oral tablet, extended release: 1 tab(s) orally 2 times a day (15 May 2024 17:06)  spironolactone 25 mg oral tablet: 1 tab(s) orally once a day (15 May 2024 17:06)      VITALS:   T(F): 97.3 ( @ 00:57), Max: 98.1 ( @ 08:32)  HR: 60 ( @ 07:15) (58 - 97)  BP: 139/85 ( @ 07:15) (115/94 - 163/92)  BP(mean): 102 ( @ 05:45) (102 - 102)  RR: 18 ( @ 07:15) (16 - 18)  SpO2: 99% ( @ 07:15) (95% - 99%)      REVIEW OF SYSTEMS:  CONSTITUTIONAL: No fever, weight loss, fatigue  NECK: No pain or stiffness  RESPIRATORY: See HPI  CARDIOVASCULAR: See HPI  GASTROINTESTINAL: No abdominal/epigastric pain, nausea, vomiting, hematemesis, diarrhea, constipation, melena or hematochezia  GENITOURINARY: No dysuria, frequency, hematuria, incontinence  NEUROLOGICAL: No headaches, memory loss, loss of strength, numbness, tremors  SKIN: No itching, burning, rashes, lesions   ENDOCRINE: No heat/cold intolerance or hair loss  MUSCULOSKELETAL: No joint pain or swelling  HEME/LYMPH: No easy bruising or bleeding gums    PHYSICAL EXAM:  General: Not in distress.  Non-toxic appearing.   HEENT: EOMI  Cardio: regular, S1, S2, no murmur  Pulm: B/L BS.  Diminished breath sounds at bases, L>>R  Abdomen: Soft, non-tender, non-distended  Extremities: +edema b/l le  Neuro: A&O x3      LABS:                        11.6   4.85  )-----------( 265      ( 2025 06:16 )             35.2         139  |  96[L]  |  25[H]  ----------------------------<  94  4.3   |  30  |  0.9    Ca    9.8      2025 06:16  Mg     1.8         TPro  7.2  /  Alb  4.4  /  TBili  0.8  /  DBili  x   /  AST  70[H]  /  ALT  88[H]  /  AlkPhos  69          RADIOLOGY:  -CXR (2025): Findings compatible with CHF/fluid overload in the correct clinical   setting.  -TTE (2024): EF 41%, G2DD, Mild to Moderate MR, Moderate TR, LA enlargement.       -CATHETERIZATION (2020): IMPRESSIONS:    1. 3-vessel coronary artery disease.  2. Proximal LAD occlusion with patent LIMA-to-LAD and SVG-to-2nd Diagonal.  3. Subtotal 2nd Diagonal occlusion distal to SVG anastomosis with faint  collateral filling.  4. High OM1 occlusion.  5. Known SVG-to-RCA and SVG Izagerwaoi-il-Vnaxp / OM2 occlusions.  6. Patent RCA stent.      EC Lead ECG:   Ventricular Rate 62 BPM    Atrial Rate 62 BPM    P-R Interval 272 ms    QRS Duration 78 ms    Q-T Interval 426 ms    QTC Calculation(Bazett) 432 ms    P Axis 41 degrees    R Axis -19 degrees    T Axis 57 degrees    Diagnosis Line Sinus rhythm with 1st degree A-V block with occasional Premature ventricular  complexes  Minimal voltage criteria for LVH, may be normal variant ( R in aVL )  Inferior infarct (cited on or before 13-MAY-2013)  Anterior infarct , age undetermined  Abnormal ECG    Confirmed by Raul Gates ([id number]) on 2025 9:20:53 AM ( @ 23:54)      TELEMETRY EVENTS: no events on telemetry

## 2025-01-06 NOTE — PATIENT PROFILE ADULT - FUNCTIONAL ASSESSMENT - BASIC MOBILITY 3.
[facility] Medicine Daily Progress Note    Date of Service  9/19/2023    Chief Complaint  Chris Leggett is a 73 y.o. male admitted 9/18/2023 with Seizure (EMS was called by group home roommate who stated pt was on the ground with ALOC. EMS arrived, pt was loaded into the rig and then had a witness tonic clonic seizure for 2 mins approx. EMS gave 5mg versed IV. )    Hospital Course  No notes on file  Working up and managing seizures.   Interval Problem Update  9/19. I reviewed the chart along with vitals, labs, imaging, test (both pending and resulted) and recommendations from specialists and interdisciplinary team.  He has an alcohol history but he doesn't seem very reliable.  I spoke with Dr. Roberts that I ordered Detox bag with thiamine    I have discussed this patient's plan of care and discharge plan at IDT rounds today with Case Management, Nursing, Nursing leadership, and other members of the IDT team.    Consultants/Specialty  neurology    Code Status  Full Code    Disposition  The patient is not medically cleared for discharge to home or a post-acute facility.      I have placed the appropriate orders for post-discharge needs.    Review of Systems  Review of Systems   Unable to perform ROS: Other   Unreliable.    Physical Exam  Temp:  [36.3 °C (97.3 °F)-37.3 °C (99.1 °F)] 36.7 °C (98.1 °F)  Pulse:  [] 58  Resp:  [15-23] 20  BP: (136-187)/() 136/85  SpO2:  [88 %-97 %] 96 %    Physical Exam  Vitals and nursing note reviewed.   HENT:      Head: Normocephalic and atraumatic.      Right Ear: External ear normal.      Left Ear: External ear normal.      Nose: Nose normal.      Mouth/Throat:      Mouth: Mucous membranes are moist.   Eyes:      General: No scleral icterus.     Conjunctiva/sclera: Conjunctivae normal.   Cardiovascular:      Rate and Rhythm: Normal rate and regular rhythm.      Heart sounds: No murmur heard.     No friction rub. No gallop.   Pulmonary:      Effort: Pulmonary effort is  "normal.      Breath sounds: Normal breath sounds.   Abdominal:      General: Abdomen is flat. Bowel sounds are normal. There is no distension.      Palpations: Abdomen is soft.      Tenderness: There is no abdominal tenderness. There is no guarding.   Musculoskeletal:         General: Normal range of motion.      Cervical back: Normal range of motion and neck supple.   Skin:     General: Skin is warm.   Neurological:      Mental Status: He is alert and oriented to person, place, and time. Mental status is at baseline.      Coordination: Coordination abnormal (mild tremors).      Comments: Poor insight   Psychiatric:         Mood and Affect: Mood normal.         Behavior: Behavior normal.         Thought Content: Thought content normal.         Judgment: Judgment normal.         Fluids    Intake/Output Summary (Last 24 hours) at 9/19/2023 1829  Last data filed at 9/19/2023 1300  Gross per 24 hour   Intake 100 ml   Output 250 ml   Net -150 ml       Laboratory  Recent Labs     09/18/23  1834   WBC 3.6*   RBC 3.46*   HEMOGLOBIN 11.5*   HEMATOCRIT 33.2*   MCV 96.0   MCH 33.2*   MCHC 34.6   RDW 50.4*   PLATELETCT 40*   MPV 10.9     Recent Labs     09/18/23  1834   SODIUM 135   POTASSIUM 3.4*   CHLORIDE 100   CO2 16*   GLUCOSE 114*   BUN 6*   CREATININE 0.81   CALCIUM 8.8                   Imaging  CT-HEAD W/O   Final Result      1.  There is no acute intracranial hemorrhage or infarct.      2.  White matter lucencies most consistent with small vessel ischemic change versus demyelination or gliosis.      3.  There is cerebral atrophy.              Assessment/Plan  * Seizures (HCC)- (present on admission)  Assessment & Plan  Frequent neurochecks  Seizure precautions  Loaded with Keppra, continue Keppra 500 mg twice daily  EEG  Neurology consultation in\"    COntinue seizure meds.  SPoke with Dr. Roberts, Neurology  I ordered detox bag with thiamine.  Mild hypokalemia. Replete with detox bag ordered Mg level    Thrombocytopenia " "(HCC)- (present on admission)  Assessment & Plan  Monitor\"    Recent Labs     09/18/23  1834   WBC 3.6*   RBC 3.46*   HEMOGLOBIN 11.5*   HEMATOCRIT 33.2*   MCV 96.0   MCH 33.2*   RDW 50.4*   PLATELETCT 40*   MPV 10.9   NEUTSPOLYS 83.70*   LYMPHOCYTES 9.00*   MONOCYTES 5.30   EOSINOPHILS 1.40   BASOPHILS 0.30         Essential hypertension- (present on admission)  Assessment & Plan  Resume home medications  IV labetalol as needed\"    Vitals:    09/19/23 1600   BP: 136/85   Pulse: (!) 58   Resp: 20   Temp: 36.7 °C (98.1 °F)   SpO2: 96%     Stable.    Coagulopathy (HCC)- (present on admission)  Assessment & Plan  Monitor  Avoid anticoagulants         VTE prophylaxis:   SCDs/TEDs      I have performed a physical exam and reviewed and updated ROS and Plan today (9/19/2023). In review of yesterday's note (9/18/2023), there are no changes except as documented above.        " 3 = A little assistance

## 2025-01-06 NOTE — PATIENT PROFILE ADULT - FALL HARM RISK - HARM RISK INTERVENTIONS
Assistance with ambulation/Assistance OOB with selected safe patient handling equipment/Communicate Risk of Fall with Harm to all staff/Reinforce activity limits and safety measures with patient and family/Review medications for side effects contributing to fall risk/Sit up slowly, dangle for a short time, stand at bedside before walking/Tailored Fall Risk Interventions/Toileting schedule using arm’s reach rule for commode and bathroom/Visual Cue: Yellow wristband and red socks/Bed in lowest position, wheels locked, appropriate side rails in place/Call bell, personal items and telephone in reach/Instruct patient to call for assistance before getting out of bed or chair/Non-slip footwear when patient is out of bed/Solen to call system/Physically safe environment - no spills, clutter or unnecessary equipment/Purposeful Proactive Rounding/Room/bathroom lighting operational, light cord in reach

## 2025-01-06 NOTE — DISCHARGE NOTE PROVIDER - CARE PROVIDER_API CALL
Villa Bolton  Interventional Cardiology  [address] [place] [address], [address]  [place] [address]  Phone: [phone]  Fax: [phone]  Follow Up Time: 2 weeks

## 2025-01-06 NOTE — PATIENT PROFILE ADULT - DO YOU EVER NEED HELP READING HOSPITAL MATERIALS?
Vaccine Information Statement(s) for was given today. This has been reviewed, questions answered, and verbal consent given by Parent for injection(s) and administration of Hepatitis A, Measles/Mumps/Rubella (MMR) and Varicella-Chickenpox.        Patient tolerated without incident. See immunization grid for documentation.     no

## 2025-01-07 ENCOUNTER — TRANSCRIPTION ENCOUNTER (OUTPATIENT)
Age: 75
End: 2025-01-07

## 2025-01-07 ENCOUNTER — RESULT REVIEW (OUTPATIENT)
Age: 75
End: 2025-01-07

## 2025-01-07 VITALS
HEART RATE: 73 BPM | OXYGEN SATURATION: 97 % | RESPIRATION RATE: 18 BRPM | TEMPERATURE: 98 F | SYSTOLIC BLOOD PRESSURE: 126 MMHG | DIASTOLIC BLOOD PRESSURE: 80 MMHG

## 2025-01-07 LAB
ALBUMIN SERPL ELPH-MCNC: 4.2 G/DL — SIGNIFICANT CHANGE UP (ref 3.5–5.2)
ALP SERPL-CCNC: 66 U/L — SIGNIFICANT CHANGE UP (ref 30–115)
ALT FLD-CCNC: 70 U/L — HIGH (ref 0–41)
ANION GAP SERPL CALC-SCNC: 13 MMOL/L — SIGNIFICANT CHANGE UP (ref 7–14)
AST SERPL-CCNC: 58 U/L — HIGH (ref 0–41)
BILIRUB SERPL-MCNC: 0.8 MG/DL — SIGNIFICANT CHANGE UP (ref 0.2–1.2)
BUN SERPL-MCNC: 20 MG/DL — SIGNIFICANT CHANGE UP (ref 10–20)
CALCIUM SERPL-MCNC: 9.1 MG/DL — SIGNIFICANT CHANGE UP (ref 8.4–10.5)
CHLORIDE SERPL-SCNC: 92 MMOL/L — LOW (ref 98–110)
CO2 SERPL-SCNC: 28 MMOL/L — SIGNIFICANT CHANGE UP (ref 17–32)
CREAT SERPL-MCNC: 0.8 MG/DL — SIGNIFICANT CHANGE UP (ref 0.7–1.5)
EGFR: 78 ML/MIN/1.73M2 — SIGNIFICANT CHANGE UP
GLUCOSE SERPL-MCNC: 102 MG/DL — HIGH (ref 70–99)
HCT VFR BLD CALC: 33.1 % — LOW (ref 37–47)
HGB BLD-MCNC: 11.1 G/DL — LOW (ref 12–16)
MAGNESIUM SERPL-MCNC: 1.6 MG/DL — LOW (ref 1.8–2.4)
MCHC RBC-ENTMCNC: 33.5 G/DL — SIGNIFICANT CHANGE UP (ref 32–37)
MCHC RBC-ENTMCNC: 33.9 PG — HIGH (ref 27–31)
MCV RBC AUTO: 101.2 FL — HIGH (ref 81–99)
NRBC # BLD: 0 /100 WBCS — SIGNIFICANT CHANGE UP (ref 0–0)
PLATELET # BLD AUTO: 264 K/UL — SIGNIFICANT CHANGE UP (ref 130–400)
PMV BLD: 10.2 FL — SIGNIFICANT CHANGE UP (ref 7.4–10.4)
POTASSIUM SERPL-MCNC: 3.5 MMOL/L — SIGNIFICANT CHANGE UP (ref 3.5–5)
POTASSIUM SERPL-SCNC: 3.5 MMOL/L — SIGNIFICANT CHANGE UP (ref 3.5–5)
PROT SERPL-MCNC: 6.6 G/DL — SIGNIFICANT CHANGE UP (ref 6–8)
RBC # BLD: 3.27 M/UL — LOW (ref 4.2–5.4)
RBC # FLD: 13.7 % — SIGNIFICANT CHANGE UP (ref 11.5–14.5)
SODIUM SERPL-SCNC: 133 MMOL/L — LOW (ref 135–146)
WBC # BLD: 6.65 K/UL — SIGNIFICANT CHANGE UP (ref 4.8–10.8)
WBC # FLD AUTO: 6.65 K/UL — SIGNIFICANT CHANGE UP (ref 4.8–10.8)

## 2025-01-07 PROCEDURE — 99239 HOSP IP/OBS DSCHRG MGMT >30: CPT | Mod: GC

## 2025-01-07 PROCEDURE — 93306 TTE W/DOPPLER COMPLETE: CPT | Mod: 26

## 2025-01-07 RX ORDER — FUROSEMIDE 20 MG
1 TABLET ORAL
Qty: 30 | Refills: 0
Start: 2025-01-07 | End: 2025-02-05

## 2025-01-07 RX ORDER — MAGNESIUM SULFATE 500 MG/ML
2 INJECTION, SOLUTION INTRAMUSCULAR; INTRAVENOUS ONCE
Refills: 0 | Status: COMPLETED | OUTPATIENT
Start: 2025-01-07 | End: 2025-01-07

## 2025-01-07 RX ORDER — POTASSIUM CHLORIDE 600 MG/1
20 TABLET, FILM COATED, EXTENDED RELEASE ORAL
Refills: 0 | Status: COMPLETED | OUTPATIENT
Start: 2025-01-07 | End: 2025-01-07

## 2025-01-07 RX ADMIN — POTASSIUM CHLORIDE 20 MILLIEQUIVALENT(S): 600 TABLET, FILM COATED, EXTENDED RELEASE ORAL at 13:15

## 2025-01-07 RX ADMIN — SACUBITRIL AND VALSARTAN 1 TABLET(S): 24; 26 TABLET, FILM COATED ORAL at 05:22

## 2025-01-07 RX ADMIN — POTASSIUM CHLORIDE 20 MILLIEQUIVALENT(S): 600 TABLET, FILM COATED, EXTENDED RELEASE ORAL at 12:51

## 2025-01-07 RX ADMIN — Medication 30 MILLIGRAM(S): at 12:50

## 2025-01-07 RX ADMIN — Medication 40 MILLIGRAM(S): at 05:23

## 2025-01-07 RX ADMIN — RANOLAZINE 1000 MILLIGRAM(S): 1000 TABLET, FILM COATED, EXTENDED RELEASE ORAL at 05:22

## 2025-01-07 RX ADMIN — Medication 1 TABLET(S): at 12:51

## 2025-01-07 RX ADMIN — FAMOTIDINE 20 MILLIGRAM(S): 20 TABLET, FILM COATED ORAL at 12:50

## 2025-01-07 RX ADMIN — SPIRONOLACTONE 25 MILLIGRAM(S): 50 TABLET ORAL at 05:22

## 2025-01-07 RX ADMIN — APIXABAN 5 MILLIGRAM(S): 5 TABLET, FILM COATED ORAL at 05:22

## 2025-01-07 RX ADMIN — Medication 81 MILLIGRAM(S): at 12:50

## 2025-01-07 RX ADMIN — MAGNESIUM SULFATE 25 GRAM(S): 500 INJECTION, SOLUTION INTRAMUSCULAR; INTRAVENOUS at 12:51

## 2025-01-07 NOTE — DISCHARGE NOTE NURSING/CASE MANAGEMENT/SOCIAL WORK - PATIENT PORTAL LINK FT
You can access the FollowMyHealth Patient Portal offered by [facility] by registering at the following website: [email or web address]. By joining [employer]’s FollowMyHealth portal, you will also be able to view your health information using other applications (apps) compatible with our system.

## 2025-01-07 NOTE — PROGRESS NOTE ADULT - SUBJECTIVE AND OBJECTIVE BOX
ESTELITA RUFFIN  74y  Female      Patient is a 74y old  Female who presents with a chief complaint of sob (06 Jan 2025 15:28)      INTERVAL HPI/OVERNIGHT EVENTS:  She feels better, SOB improved, no chest pain.   Vital Signs Last 24 Hrs  T(C): 36.6 (07 Jan 2025 04:52), Max: 36.7 (06 Jan 2025 21:31)  T(F): 97.9 (07 Jan 2025 04:52), Max: 98 (06 Jan 2025 21:31)  HR: 67 (07 Jan 2025 04:52) (63 - 67)  BP: 129/87 (07 Jan 2025 04:52) (102/54 - 129/87)  BP(mean): 101 (07 Jan 2025 04:52) (70 - 101)  RR: 18 (07 Jan 2025 04:52) (18 - 18)  SpO2: 97% (06 Jan 2025 17:54) (97% - 97%)    Parameters below as of 06 Jan 2025 17:54  Patient On (Oxygen Delivery Method): room air          01-06-25 @ 07:01  -  01-07-25 @ 07:00  --------------------------------------------------------  IN: 318 mL / OUT: 700 mL / NET: -382 mL    01-07-25 @ 07:01 - 01-07-25 @ 11:13  --------------------------------------------------------  IN: 0 mL / OUT: 900 mL / NET: -900 mL            Consultant(s) Notes Reviewed:  [x ] YES  [ ] NO          MEDICATIONS  (STANDING):  apixaban 5 milliGRAM(s) Oral every 12 hours  aspirin enteric coated 81 milliGRAM(s) Oral daily  calcium carbonate 1250 mG  + Vitamin D (OsCal 500 + D) 1 Tablet(s) Oral daily  famotidine    Tablet 20 milliGRAM(s) Oral daily  furosemide   Injectable 40 milliGRAM(s) IV Push daily  influenza  Vaccine (HIGH DOSE) 0.5 milliLiter(s) IntraMuscular once  isosorbide   mononitrate ER Tablet (IMDUR) 30 milliGRAM(s) Oral daily  metoprolol succinate  milliGRAM(s) Oral at bedtime  ranolazine 1000 milliGRAM(s) Oral two times a day  sacubitril 24 mG/valsartan 26 mG 1 Tablet(s) Oral two times a day  spironolactone 25 milliGRAM(s) Oral daily    MEDICATIONS  (PRN):      LABS                          11.1   6.65  )-----------( 264      ( 07 Jan 2025 06:44 )             33.1     01-07    133[L]  |  92[L]  |  20  ----------------------------<  102[H]  3.5   |  28  |  0.8    Ca    9.1      07 Jan 2025 06:44  Mg     1.6     01-07    TPro  6.6  /  Alb  4.2  /  TBili  0.8  /  DBili  x   /  AST  58[H]  /  ALT  70[H]  /  AlkPhos  66  01-07      Urinalysis Basic - ( 07 Jan 2025 06:44 )    Color: x / Appearance: x / SG: x / pH: x  Gluc: 102 mg/dL / Ketone: x  / Bili: x / Urobili: x   Blood: x / Protein: x / Nitrite: x   Leuk Esterase: x / RBC: x / WBC x   Sq Epi: x / Non Sq Epi: x / Bacteria: x        Lactate Trend        CAPILLARY BLOOD GLUCOSE            RADIOLOGY & ADDITIONAL TESTS:    Imaging Personally Reviewed:  [ ] YES  [ ] NO    HEALTH ISSUES - PROBLEM Dx:          PHYSICAL EXAM:  GENERAL: NAD, well-developed.  HEAD:  Atraumatic, Normocephalic.  EYES: EOMI, PERRLA, conjunctiva and sclera clear.  NECK: Supple, No JVD.  CHEST/LUNG: Clear to auscultation bilaterally; No wheeze.  HEART: Regular rate and rhythm; S1 S2.   ABDOMEN: Soft, Nontender, Nondistended; Bowel sounds present.  EXTREMITIES:  2+ Peripheral Pulses, No clubbing, trace leg edema.   PSYCH: AAOx3.  NEUROLOGY: non-focal.  SKIN: No rashes or lesions.

## 2025-01-07 NOTE — DISCHARGE NOTE NURSING/CASE MANAGEMENT/SOCIAL WORK - FINANCIAL ASSISTANCE
[facility] provides services at a reduced cost to those who are determined to be eligible through [facility]’s financial assistance program. Information regarding [facility]’s financial assistance program can be found by going to [email or web address] or by calling [phone].

## 2025-01-07 NOTE — PROGRESS NOTE ADULT - ASSESSMENT
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 Admitted to telemetry for further management of CHF exacerbation.    A/P:   Acute on Chronic HFmrEF(improved LVEF was 30%):   Ischemic cardiomyopathy s/p AICD:   Patient with SOB and leg edema, Pro-BNP 2400  CXR showed mild bilateral interstitial infiltrates.   Echo is May 2024 showed LVEF 41, G2DD, mod TR, mod MR.   S/p Lasix IV, symptoms improved, lungs are clear, leg edema improved, switch to Lasix 40mg po daily.   Seen by Cardiology recommended Aldactone 25mg po daily, Toprol 25mg po daily, Entresto 24/26mg BID   Can add Farxiga outpatient.   Advised with low sodium diet, fluid restriction 1.5L/day. Cardiology follow up outpatient.     CAD s/p CABG:   Stable, no chest pain, Troponin 19>18 stable.   EKG showed Normal Sinus Rhythm, PVCs.   Continue ASA, Metoprolol, Lipitor, Imdur and Ranexa.     Paroxysmal Atrial Fibrillation:   Sinus rhythm, Continue Metoprolol and Eliquis.       Mild Hyponatremia: due to CHF.   Mild transaminitis likely due to congestive hepatopathy.   LFTs stable, hepatitis panel negative.   Abdomen US normal.       #Progress Note Handoff  Pending (specify):    Family discussion: house staff updated pt family  Disposition: home today.

## 2025-01-10 ENCOUNTER — APPOINTMENT (OUTPATIENT)
Dept: CARDIOLOGY | Facility: CLINIC | Age: 75
End: 2025-01-10
Payer: MEDICAID

## 2025-01-10 VITALS
WEIGHT: 179 LBS | SYSTOLIC BLOOD PRESSURE: 102 MMHG | DIASTOLIC BLOOD PRESSURE: 66 MMHG | HEART RATE: 57 BPM | BODY MASS INDEX: 32.94 KG/M2 | HEIGHT: 62 IN

## 2025-01-10 VITALS — OXYGEN SATURATION: 97 %

## 2025-01-10 DIAGNOSIS — I25.5 ISCHEMIC CARDIOMYOPATHY: ICD-10-CM

## 2025-01-10 DIAGNOSIS — I50.22 CHRONIC SYSTOLIC (CONGESTIVE) HEART FAILURE: ICD-10-CM

## 2025-01-10 DIAGNOSIS — I25.10 ATHEROSCLEROTIC HEART DISEASE OF NATIVE CORONARY ARTERY W/OUT ANGINA PECTORIS: ICD-10-CM

## 2025-01-10 DIAGNOSIS — I34.0 NONRHEUMATIC MITRAL (VALVE) INSUFFICIENCY: ICD-10-CM

## 2025-01-10 DIAGNOSIS — I10 ESSENTIAL (PRIMARY) HYPERTENSION: ICD-10-CM

## 2025-01-10 PROCEDURE — 99214 OFFICE O/P EST MOD 30 MIN: CPT

## 2025-01-10 PROCEDURE — 93000 ELECTROCARDIOGRAM COMPLETE: CPT

## 2025-01-10 RX ORDER — FUROSEMIDE 20 MG/1
20 TABLET ORAL
Qty: 90 | Refills: 1 | Status: ACTIVE | COMMUNITY
Start: 1900-01-01 | End: 1900-01-01

## 2025-01-10 RX ORDER — DAPAGLIFLOZIN 10 MG/1
10 TABLET, FILM COATED ORAL DAILY
Qty: 90 | Refills: 1 | Status: ACTIVE | COMMUNITY
Start: 2025-01-10 | End: 1900-01-01

## 2025-01-13 DIAGNOSIS — I48.0 PAROXYSMAL ATRIAL FIBRILLATION: ICD-10-CM

## 2025-01-13 DIAGNOSIS — Z95.810 PRESENCE OF AUTOMATIC (IMPLANTABLE) CARDIAC DEFIBRILLATOR: ICD-10-CM

## 2025-01-13 DIAGNOSIS — I25.118 ATHEROSCLEROTIC HEART DISEASE OF NATIVE CORONARY ARTERY WITH OTHER FORMS OF ANGINA PECTORIS: ICD-10-CM

## 2025-01-13 DIAGNOSIS — E78.00 PURE HYPERCHOLESTEROLEMIA, UNSPECIFIED: ICD-10-CM

## 2025-01-13 DIAGNOSIS — I50.23 ACUTE ON CHRONIC SYSTOLIC (CONGESTIVE) HEART FAILURE: ICD-10-CM

## 2025-01-13 DIAGNOSIS — R74.01 ELEVATION OF LEVELS OF LIVER TRANSAMINASE LEVELS: ICD-10-CM

## 2025-01-13 DIAGNOSIS — E83.42 HYPOMAGNESEMIA: ICD-10-CM

## 2025-01-13 DIAGNOSIS — Z95.1 PRESENCE OF AORTOCORONARY BYPASS GRAFT: ICD-10-CM

## 2025-01-13 DIAGNOSIS — I11.0 HYPERTENSIVE HEART DISEASE WITH HEART FAILURE: ICD-10-CM

## 2025-01-13 DIAGNOSIS — E87.1 HYPO-OSMOLALITY AND HYPONATREMIA: ICD-10-CM

## 2025-01-13 DIAGNOSIS — Z79.82 LONG TERM (CURRENT) USE OF ASPIRIN: ICD-10-CM

## 2025-01-13 DIAGNOSIS — Z11.52 ENCOUNTER FOR SCREENING FOR COVID-19: ICD-10-CM

## 2025-01-13 DIAGNOSIS — Z79.01 LONG TERM (CURRENT) USE OF ANTICOAGULANTS: ICD-10-CM

## 2025-01-13 DIAGNOSIS — Z91.119 PATIENT'S NONCOMPLIANCE WITH DIETARY REGIMEN DUE TO UNSPECIFIED REASON: ICD-10-CM

## 2025-01-13 DIAGNOSIS — I25.5 ISCHEMIC CARDIOMYOPATHY: ICD-10-CM

## 2025-01-30 ENCOUNTER — APPOINTMENT (OUTPATIENT)
Dept: ELECTROPHYSIOLOGY | Facility: CLINIC | Age: 75
End: 2025-01-30

## 2025-02-07 ENCOUNTER — APPOINTMENT (OUTPATIENT)
Dept: CARDIOLOGY | Facility: CLINIC | Age: 75
End: 2025-02-07

## 2025-02-07 PROCEDURE — 93306 TTE W/DOPPLER COMPLETE: CPT

## 2025-02-28 ENCOUNTER — NON-APPOINTMENT (OUTPATIENT)
Age: 75
End: 2025-02-28

## 2025-02-28 ENCOUNTER — APPOINTMENT (OUTPATIENT)
Dept: CARDIOLOGY | Facility: CLINIC | Age: 75
End: 2025-02-28
Payer: MEDICAID

## 2025-02-28 VITALS
BODY MASS INDEX: 32.94 KG/M2 | SYSTOLIC BLOOD PRESSURE: 94 MMHG | WEIGHT: 179 LBS | HEART RATE: 81 BPM | HEIGHT: 62 IN | DIASTOLIC BLOOD PRESSURE: 64 MMHG

## 2025-02-28 DIAGNOSIS — I34.0 NONRHEUMATIC MITRAL (VALVE) INSUFFICIENCY: ICD-10-CM

## 2025-02-28 DIAGNOSIS — I25.5 ISCHEMIC CARDIOMYOPATHY: ICD-10-CM

## 2025-02-28 DIAGNOSIS — I25.10 ATHEROSCLEROTIC HEART DISEASE OF NATIVE CORONARY ARTERY W/OUT ANGINA PECTORIS: ICD-10-CM

## 2025-02-28 DIAGNOSIS — I48.91 UNSPECIFIED ATRIAL FIBRILLATION: ICD-10-CM

## 2025-02-28 DIAGNOSIS — I50.22 CHRONIC SYSTOLIC (CONGESTIVE) HEART FAILURE: ICD-10-CM

## 2025-02-28 LAB
HCT VFR BLD CALC: 38 %
HGB BLD-MCNC: 12.6 G/DL
MCHC RBC-ENTMCNC: 32.6 PG
MCHC RBC-ENTMCNC: 33.2 G/DL
MCV RBC AUTO: 98.2 FL
PLATELET # BLD AUTO: 250 K/UL
PMV BLD AUTO: 0 /100 WBCS
PMV BLD: 10.5 FL
RBC # BLD: 3.87 M/UL
RBC # FLD: 13.6 %
WBC # FLD AUTO: 5.34 K/UL

## 2025-02-28 PROCEDURE — 99214 OFFICE O/P EST MOD 30 MIN: CPT

## 2025-03-03 LAB
ALBUMIN SERPL ELPH-MCNC: 4.6 G/DL
ALP BLD-CCNC: 60 U/L
ALT SERPL-CCNC: 14 U/L
ANION GAP SERPL CALC-SCNC: 15 MMOL/L
AST SERPL-CCNC: 20 U/L
BILIRUB SERPL-MCNC: 0.5 MG/DL
BUN SERPL-MCNC: 21 MG/DL
CALCIUM SERPL-MCNC: 10.3 MG/DL
CHLORIDE SERPL-SCNC: 99 MMOL/L
CO2 SERPL-SCNC: 22 MMOL/L
CREAT SERPL-MCNC: 1 MG/DL
EGFR: 59 ML/MIN/1.73M2
GLUCOSE SERPL-MCNC: 102 MG/DL
NT-PROBNP SERPL-MCNC: 1128 PG/ML
POTASSIUM SERPL-SCNC: 5.3 MMOL/L
PROT SERPL-MCNC: 8 G/DL
SODIUM SERPL-SCNC: 136 MMOL/L

## 2025-03-03 NOTE — PATIENT PROFILE ADULT - NSPROPOAURINARYCATHETER_GEN_A_NUR
"Process Group Note    PATIENT'S NAME: Fabiana Hu  MRN:   [id number]  :   1957  ACCT. NUMBER: [id number]  DATE OF SERVICE: 3/03/25  START TIME:  1:00 PM  END TIME:  1:50 PM  FACILITATOR: Darling Leiva LICSW  TOPIC:  Process Group    Diagnoses:  296.32 (F33.1) Major Depressive Disorder, Recurrent Episode, Moderate _ and With anxious distress  300.02 (F41.1) Generalized Anxiety Disorder.     [facility]  TRACK: IOP/DT 3 55+    NUMBER OF PARTICIPANTS: 6        Data:    Session content: At the start of this group, patients were invited to check in by identifying themselves, describing their current emotional status, and identifying issues to address in this group.   Area(s) of treatment focus addressed in this session included Symptom Management, Personal Safety, and Community Resources/Discharge Planning.  Reported mood is \"anxious\".  She reported having a \"nice weekend\" consisting of her brother reaching out.  Her brother triggered anxiety when he asked her what she was doing to address current political state of affairs.  She emphasized the importance of daily self-care behaviors.  She reports still healing from her surgery.   Client denied safety concerns, including SI and SIB. Client denies any chemical use.  Client is taking medications as prescribed. Client's goal is \"eat heathier; eat a salad every day this week\".  Client talked about driving daily with her . Peers offered supportive feedback and validation.            3/3/2025     3:00 PM   Suicide Ideation Check In   Since last session, how often have you had suicidal thoughts? No thoughts of suicide             Therapeutic Interventions/Treatment Strategies:  Psychotherapist offered support, feedback and validation and reinforced use of skills. Treatment modalities used include Cognitive Behavioral Therapy and Dialectical Behavioral Therapy. Interventions include Behavioral Activation: Explored how " behaviors effect mood and interact with thoughts and feelings and Encouraged strategies to reduce individual procrastination and increase motivation by increasing goal-directed activities to enhance mood and reduce symptoms. and Coping Skills: Discussed importance of physical self-care to address mental health symptoms.    Assessment:    Patient response:   Patient responded to session by accepting feedback, giving feedback, listening, focusing on goals, being attentive, and accepting support    Possible barriers to participation / learning include: and no barriers identified    Health Issues:   Yes: Chronic disease management, No Psychological Distress       Substance Use Review:   Substance Use: No active concerns identified.    Mental Status/Behavioral Observations  Appearance:   Appropriate   Eye Contact:   Good   Psychomotor Behavior: Normal   Attitude:   Cooperative  Interested Friendly Pleasant  Orientation:   All  Speech   Rate / Production: Normal    Volume:  Normal   Mood:    Anxious   Affect:    Appropriate   Thought Content:   Rumination and Safety denies any current safety concerns including suicidal ideation, self-harm, and homicidal ideation  Thought Form:  Coherent  Logical     Insight:    Good     Plan:   Safety Plan: No current safety concerns identified.  Recommended that patient call 911 or go to the local ED should there be a change in any of these risk factors.   Barriers to treatment: None identified  Patient Contracts (see media tab):  None  Substance Use: Not addressed in session   Continue or Discharge: Patient will continue in 55+ Program (55+) as planned. Patient is likely to benefit from learning and using skills as they work toward the goals identified in their treatment plan.      WENDY Currie  March 3, 2025   no

## 2025-03-13 ENCOUNTER — OUTPATIENT (OUTPATIENT)
Dept: OUTPATIENT SERVICES | Facility: HOSPITAL | Age: 75
LOS: 1 days | End: 2025-03-13
Payer: MEDICARE

## 2025-03-13 ENCOUNTER — APPOINTMENT (OUTPATIENT)
Dept: INTERNAL MEDICINE | Facility: CLINIC | Age: 75
End: 2025-03-13
Payer: MEDICARE

## 2025-03-13 VITALS
SYSTOLIC BLOOD PRESSURE: 107 MMHG | DIASTOLIC BLOOD PRESSURE: 73 MMHG | OXYGEN SATURATION: 95 % | BODY MASS INDEX: 33.31 KG/M2 | HEIGHT: 62 IN | HEART RATE: 82 BPM | TEMPERATURE: 97 F | WEIGHT: 181 LBS

## 2025-03-13 DIAGNOSIS — Z95.810 PRESENCE OF AUTOMATIC (IMPLANTABLE) CARDIAC DEFIBRILLATOR: Chronic | ICD-10-CM

## 2025-03-13 DIAGNOSIS — Z92.89 PERSONAL HISTORY OF OTHER MEDICAL TREATMENT: ICD-10-CM

## 2025-03-13 DIAGNOSIS — E66.9 OBESITY, UNSPECIFIED: ICD-10-CM

## 2025-03-13 DIAGNOSIS — Z95.1 PRESENCE OF AORTOCORONARY BYPASS GRAFT: Chronic | ICD-10-CM

## 2025-03-13 DIAGNOSIS — I50.22 CHRONIC SYSTOLIC (CONGESTIVE) HEART FAILURE: ICD-10-CM

## 2025-03-13 DIAGNOSIS — I48.91 UNSPECIFIED ATRIAL FIBRILLATION: ICD-10-CM

## 2025-03-13 DIAGNOSIS — G25.81 RESTLESS LEGS SYNDROME: ICD-10-CM

## 2025-03-13 DIAGNOSIS — E78.5 HYPERLIPIDEMIA, UNSPECIFIED: ICD-10-CM

## 2025-03-13 DIAGNOSIS — Z00.00 ENCOUNTER FOR GENERAL ADULT MEDICAL EXAMINATION WITHOUT ABNORMAL FINDINGS: ICD-10-CM

## 2025-03-13 DIAGNOSIS — I25.10 ATHEROSCLEROTIC HEART DISEASE OF NATIVE CORONARY ARTERY W/OUT ANGINA PECTORIS: ICD-10-CM

## 2025-03-13 DIAGNOSIS — I10 ESSENTIAL (PRIMARY) HYPERTENSION: ICD-10-CM

## 2025-03-13 DIAGNOSIS — Z98.890 OTHER SPECIFIED POSTPROCEDURAL STATES: Chronic | ICD-10-CM

## 2025-03-13 PROCEDURE — 80061 LIPID PANEL: CPT

## 2025-03-13 PROCEDURE — T1013: CPT

## 2025-03-13 PROCEDURE — 82306 VITAMIN D 25 HYDROXY: CPT

## 2025-03-13 PROCEDURE — 99214 OFFICE O/P EST MOD 30 MIN: CPT

## 2025-03-13 PROCEDURE — G2211 COMPLEX E/M VISIT ADD ON: CPT

## 2025-03-13 PROCEDURE — 83036 HEMOGLOBIN GLYCOSYLATED A1C: CPT

## 2025-03-13 PROCEDURE — 80053 COMPREHEN METABOLIC PANEL: CPT

## 2025-03-13 PROCEDURE — 85025 COMPLETE CBC W/AUTO DIFF WBC: CPT

## 2025-03-13 PROCEDURE — 36415 COLL VENOUS BLD VENIPUNCTURE: CPT

## 2025-03-20 DIAGNOSIS — I50.22 CHRONIC SYSTOLIC (CONGESTIVE) HEART FAILURE: ICD-10-CM

## 2025-03-20 DIAGNOSIS — G25.81 RESTLESS LEGS SYNDROME: ICD-10-CM

## 2025-03-20 DIAGNOSIS — I25.10 ATHEROSCLEROTIC HEART DISEASE OF NATIVE CORONARY ARTERY WITHOUT ANGINA PECTORIS: ICD-10-CM

## 2025-03-20 DIAGNOSIS — E78.5 HYPERLIPIDEMIA, UNSPECIFIED: ICD-10-CM

## 2025-03-20 DIAGNOSIS — I48.91 UNSPECIFIED ATRIAL FIBRILLATION: ICD-10-CM

## 2025-03-20 DIAGNOSIS — I10 ESSENTIAL (PRIMARY) HYPERTENSION: ICD-10-CM

## 2025-03-20 DIAGNOSIS — Z92.89 PERSONAL HISTORY OF OTHER MEDICAL TREATMENT: ICD-10-CM

## 2025-03-20 DIAGNOSIS — E66.9 OBESITY, UNSPECIFIED: ICD-10-CM

## 2025-03-21 ENCOUNTER — APPOINTMENT (OUTPATIENT)
Dept: CARDIOLOGY | Facility: CLINIC | Age: 75
End: 2025-03-21

## 2025-03-21 PROCEDURE — 93296 REM INTERROG EVL PM/IDS: CPT

## 2025-03-21 PROCEDURE — 93295 DEV INTERROG REMOTE 1/2/MLT: CPT

## 2025-03-25 VITALS
OXYGEN SATURATION: 98 % | RESPIRATION RATE: 19 BRPM | HEART RATE: 80 BPM | DIASTOLIC BLOOD PRESSURE: 86 MMHG | SYSTOLIC BLOOD PRESSURE: 141 MMHG

## 2025-03-25 NOTE — H&P CARDIOLOGY - HISTORY OF PRESENT ILLNESS
Patient is a 74y Female PMH of CAD, CABG 2011 in [facility], HLD, HTN, AICD, seizure, pafib (on Eliquis-last dose taken.......  Pt reports having episdoed of CARRERA that has been worsening. Patient was recently hospitalized for worsening orthopnea, LE swelling and exertional Dyspnea.  Patient presents to cardiology dept for RHc/LHC for workup for possible HEBER.     Pre cath note:  indication:  [ ] STEMI                [ ] NSTEMI                 [ ] Acute coronary syndrome                   [ ]Unstable Angina   [ ] high risk  [ ] intermediate risk  [ ] low risk                   [ ] Stable Angina     non-invasive testing:                          Date:                     result: [ ] high risk  [ ] intermediate risk  [ ] low risk    Anti- Anginal medications:                    [ ] not used d/t                     [ x] used   (x ) BB     ( ) CCB      ( x) Nitrate   (  ) Ranexa          [ ] not used but strong indication not to use    Ejection Fraction                   [ ] <29            [x ] 30-39%   [ ] 40-49%     [ ]>50%    CHF          [ ] active (within last 14 days on meds   [ ] Chronic (on meds but no exacerbation)  NYHA Functional Class:  (  ) Class I (no limitations)  (  ) Class II (slight limitation)  (  ) Class III (marked limitation)  (  ) Class IV (symptoms at rest)    COPD                   [ ] mild (on chronic bronchodilators)  [ ] moderate (on chronic steroid therapy)      [ ] severe (indication for home O2 or PACO2 >50)    Other risk factors:                     [ x] Previous MI                     [ ] CVA/ stroke                    [ ] carotid stent/ CEA                    [ ] PVD/PAD- (arterial aneurysm, non-palpable pulses, tortuous vessel with inability to insert catheter, infra-renal dissection, renal or subclavian artery stenosis)                    [x ] previous CABG                    [ ] Renal Failure:  on HD  (  ) yes  (  ) no                    [ ] Diabetic  (  ) Type 1  (  ) Type 2                                         (  ) Insulin dependent  (  ) non-insulin dependent                                         (  ) Metformin  (  ) Januvia  (  ) Glimepiride  (  ) Glipizide  (  ) Glyburide  (  ) Actos                                         (  ) GLP-1 receptor agonists (Ozempic, Mounjaro, Wegovy, Zepbound, Trulicity, Byetta, Victoza)                                         (  ) SGLT2 Inhibitors (Farxiga, Jardiance, Invokana)                                         (  ) Other                Bleeding Risk:     Pre-cath Hydration: (  )  cc IV bolus x 1 over 1 hr followed by:    (  ) NS @ 75cc/hr until procedure (up to 2 hrs) if EF> 50%                                                                                                                             (  ) NS @ 50cc/hr until procedure (up to 2 hrs) if EF< 50%                                        (  ) No precath hydration d/t      RIGHT RADIAL ARTERY EVALUATION:  RAKAN TEST: [] Negative          [] Positive    EF:   Date: < from: TTE Echo Complete w/o Contrast w/ Doppler (01.07.25 @ 10:38) >    Summary:   1. Diffuse hypokinesis with severe hypokinesis/akinesis of the   lwiuj-fc-ndn inferior and lateral wall segments. Overall, moderately   decreased global left ventricular systolic function with ejection   fraction, by visual estimation, is 35 to 40%. Severe (grade 3) diastolic   dysfunction.   2. Normal right ventricular size with reduced systolic function.   3. Moderately enlarged left atrium.   4. Moderate mitral valve regurgitation.   5. Moderate tricuspid regurgitation.   6. Mild pulmonary hypertension (PASP = 44mmHg).   7. There is no evidence of pericardial effusion.   8. Compared to prior study, note mild decline in systolic function. Note   that the prior study did not include echocontrast imaging.          EKG:   Date: Patient is a 74y Female PMH of CAD, CABG x 5 2011 in [facility], HLD, HTN, AICD, seizure, pafib (on Eliquis-last dose taken. 2 days ago)  Pt reports having episodes of CARRERA that has been worsening. Patient was recently hospitalized for worsening orthopnea, LE swelling and exertional Dyspnea.  Patient presents to cardiology dept for RHc/LHC for workup for possible HEBER.       < from: Cardiac Cath Lab - Adult (01.16.20 @ 17:17) >  IMPRESSIONS:    1. 3-vessel coronary artery disease.    2. Proximal LAD occlusion with patent LIMA-to-LAD and SVG-to-2nd Diagonal.    3. Subtotal 2nd Diagonal occlusion distal to SVG anastomosis with faint    collateral filling.    4. High OM1 occlusion.    5. Known SVG-to-RCA and SVG Wczyklgfjg-zy-Riwro / OM2 occlusions.    6. Patent RCA stent.            Pre cath note:--Pre HEBER workup  indication:  [ ] STEMI                [ ] NSTEMI                 [ ] Acute coronary syndrome                   [ ]Unstable Angina   [ ] high risk  [ ] intermediate risk  [ ] low risk                   [ ] Stable Angina     non-invasive testing:                          Date:                     result: [ ] high risk  [ ] intermediate risk  [ ] low risk    Anti- Anginal medications:                    [ ] not used d/t                     [ x] used   (x ) BB     ( ) CCB      ( x) Nitrate   ( x ) Ranexa          [ ] not used but strong indication not to use    Ejection Fraction                   [ ] <29            [x ] 30-39%   [ ] 40-49%     [ ]>50%    CHF          [ ] active (within last 14 days on meds   [ x] Chronic (on meds but no exacerbation)  NYHA Functional Class:  (  ) Class I (no limitations)  ( x ) Class II (slight limitation)  (  ) Class III (marked limitation)  (  ) Class IV (symptoms at rest)    COPD                   [ ] mild (on chronic bronchodilators)  [ ] moderate (on chronic steroid therapy)      [ ] severe (indication for home O2 or PACO2 >50)    Other risk factors:                     [ x] Previous MI                     [ ] CVA/ stroke                    [ ] carotid stent/ CEA                    [ ] PVD/PAD- (arterial aneurysm, non-palpable pulses, tortuous vessel with inability to insert catheter, infra-renal dissection, renal or subclavian artery stenosis)                    [x ] previous CABG                    [ ] Renal Failure:  on HD  (  ) yes  (  ) no                    [ ] Diabetic  (  ) Type 1  (  ) Type 2                                         (  ) Insulin dependent  (  ) non-insulin dependent                                         (  ) Metformin  (  ) Januvia  (  ) Glimepiride  (  ) Glipizide  (  ) Glyburide  (  ) Actos                                         (  ) GLP-1 receptor agonists (Ozempic, Mounjaro, Wegovy, Zepbound, Trulicity, Byetta, Victoza)                                         (  ) SGLT2 Inhibitors (Farxiga, Jardiance, Invokana)                                         (  ) Other                Bleeding Risk:     Pre-cath Hydration: (  )  cc IV bolus x 1 over 1 hr followed by:    (  ) NS @ 75cc/hr until procedure (up to 2 hrs) if EF> 50%                                                                                                                             (  ) NS @ 50cc/hr until procedure (up to 2 hrs) if EF< 50%                                        (  ) No precath hydration d/t      RIGHT RADIAL ARTERY EVALUATION:  RAKAN TEST: [] Negative          [] Positive    EF:   Date: < from: TTE Echo Complete w/o Contrast w/ Doppler (01.07.25 @ 10:38) >    Summary:   1. Diffuse hypokinesis with severe hypokinesis/akinesis of the   tdwlv-ad-nlc inferior and lateral wall segments. Overall, moderately   decreased global left ventricular systolic function with ejection   fraction, by visual estimation, is 35 to 40%. Severe (grade 3) diastolic   dysfunction.   2. Normal right ventricular size with reduced systolic function.   3. Moderately enlarged left atrium.   4. Moderate mitral valve regurgitation.   5. Moderate tricuspid regurgitation.   6. Mild pulmonary hypertension (PASP = 44mmHg).   7. There is no evidence of pericardial effusion.   8. Compared to prior study, note mild decline in systolic function. Note   that the prior study did not include echocontrast imaging.          EKG:   Date: 4/1/25

## 2025-04-01 ENCOUNTER — RESULT REVIEW (OUTPATIENT)
Age: 75
End: 2025-04-01

## 2025-04-01 ENCOUNTER — OUTPATIENT (OUTPATIENT)
Dept: OUTPATIENT SERVICES | Facility: HOSPITAL | Age: 75
LOS: 1 days | Discharge: HOME | End: 2025-04-01
Payer: MEDICAID

## 2025-04-01 ENCOUNTER — TRANSCRIPTION ENCOUNTER (OUTPATIENT)
Age: 75
End: 2025-04-01

## 2025-04-01 DIAGNOSIS — I34.0 NONRHEUMATIC MITRAL (VALVE) INSUFFICIENCY: ICD-10-CM

## 2025-04-01 DIAGNOSIS — Z95.810 PRESENCE OF AUTOMATIC (IMPLANTABLE) CARDIAC DEFIBRILLATOR: Chronic | ICD-10-CM

## 2025-04-01 DIAGNOSIS — I25.10 ATHEROSCLEROTIC HEART DISEASE OF NATIVE CORONARY ARTERY WITHOUT ANGINA PECTORIS: ICD-10-CM

## 2025-04-01 DIAGNOSIS — Z95.1 PRESENCE OF AORTOCORONARY BYPASS GRAFT: Chronic | ICD-10-CM

## 2025-04-01 DIAGNOSIS — Z98.890 OTHER SPECIFIED POSTPROCEDURAL STATES: Chronic | ICD-10-CM

## 2025-04-01 LAB
ANION GAP SERPL CALC-SCNC: 11 MMOL/L — SIGNIFICANT CHANGE UP (ref 7–14)
BUN SERPL-MCNC: 16 MG/DL — SIGNIFICANT CHANGE UP (ref 10–20)
CALCIUM SERPL-MCNC: 9.9 MG/DL — SIGNIFICANT CHANGE UP (ref 8.4–10.5)
CHLORIDE SERPL-SCNC: 103 MMOL/L — SIGNIFICANT CHANGE UP (ref 98–110)
CO2 SERPL-SCNC: 23 MMOL/L — SIGNIFICANT CHANGE UP (ref 17–32)
CREAT SERPL-MCNC: 0.9 MG/DL — SIGNIFICANT CHANGE UP (ref 0.7–1.5)
EGFR: 68 ML/MIN/1.73M2 — SIGNIFICANT CHANGE UP
EGFR: 68 ML/MIN/1.73M2 — SIGNIFICANT CHANGE UP
GLUCOSE SERPL-MCNC: 100 MG/DL — HIGH (ref 70–99)
HCT VFR BLD CALC: 35.4 % — LOW (ref 37–47)
HGB BLD-MCNC: 12.1 G/DL — SIGNIFICANT CHANGE UP (ref 12–16)
MCHC RBC-ENTMCNC: 33.2 PG — HIGH (ref 27–31)
MCHC RBC-ENTMCNC: 34.2 G/DL — SIGNIFICANT CHANGE UP (ref 32–37)
MCV RBC AUTO: 97.3 FL — SIGNIFICANT CHANGE UP (ref 81–99)
NRBC BLD AUTO-RTO: 0 /100 WBCS — SIGNIFICANT CHANGE UP (ref 0–0)
PLATELET # BLD AUTO: 244 K/UL — SIGNIFICANT CHANGE UP (ref 130–400)
PMV BLD: 10.2 FL — SIGNIFICANT CHANGE UP (ref 7.4–10.4)
POTASSIUM SERPL-MCNC: 4.7 MMOL/L — SIGNIFICANT CHANGE UP (ref 3.5–5)
POTASSIUM SERPL-SCNC: 4.7 MMOL/L — SIGNIFICANT CHANGE UP (ref 3.5–5)
RBC # BLD: 3.64 M/UL — LOW (ref 4.2–5.4)
RBC # FLD: 14.4 % — SIGNIFICANT CHANGE UP (ref 11.5–14.5)
SODIUM SERPL-SCNC: 137 MMOL/L — SIGNIFICANT CHANGE UP (ref 135–146)
WBC # BLD: 6.24 K/UL — SIGNIFICANT CHANGE UP (ref 4.8–10.8)
WBC # FLD AUTO: 6.24 K/UL — SIGNIFICANT CHANGE UP (ref 4.8–10.8)

## 2025-04-01 PROCEDURE — C1887: CPT

## 2025-04-01 PROCEDURE — 36415 COLL VENOUS BLD VENIPUNCTURE: CPT

## 2025-04-01 PROCEDURE — C1769: CPT

## 2025-04-01 PROCEDURE — C1894: CPT

## 2025-04-01 PROCEDURE — 93325 DOPPLER ECHO COLOR FLOW MAPG: CPT

## 2025-04-01 PROCEDURE — 93320 DOPPLER ECHO COMPLETE: CPT

## 2025-04-01 PROCEDURE — 93312 ECHO TRANSESOPHAGEAL: CPT

## 2025-04-01 PROCEDURE — 85027 COMPLETE CBC AUTOMATED: CPT

## 2025-04-01 PROCEDURE — 80048 BASIC METABOLIC PNL TOTAL CA: CPT

## 2025-04-01 PROCEDURE — 93455 CORONARY ART/GRFT ANGIO S&I: CPT

## 2025-04-01 PROCEDURE — C1760: CPT

## 2025-04-01 RX ORDER — ATORVASTATIN CALCIUM 80 MG/1
40 TABLET, FILM COATED ORAL AT BEDTIME
Refills: 0 | Status: DISCONTINUED | OUTPATIENT
Start: 2025-04-01 | End: 2025-04-02

## 2025-04-01 RX ORDER — ASPIRIN 325 MG
81 TABLET ORAL ONCE
Refills: 0 | Status: COMPLETED | OUTPATIENT
Start: 2025-04-01 | End: 2025-04-01

## 2025-04-01 RX ORDER — FUROSEMIDE 10 MG/ML
40 INJECTION INTRAMUSCULAR; INTRAVENOUS DAILY
Refills: 0 | Status: DISCONTINUED | OUTPATIENT
Start: 2025-04-02 | End: 2025-04-02

## 2025-04-01 RX ORDER — RANOLAZINE 1000 MG/1
1000 TABLET, FILM COATED, EXTENDED RELEASE ORAL
Refills: 0 | Status: DISCONTINUED | OUTPATIENT
Start: 2025-04-01 | End: 2025-04-02

## 2025-04-01 RX ORDER — SPIRONOLACTONE 25 MG
25 TABLET ORAL DAILY
Refills: 0 | Status: DISCONTINUED | OUTPATIENT
Start: 2025-04-02 | End: 2025-04-02

## 2025-04-01 RX ORDER — ASPIRIN 325 MG
81 TABLET ORAL DAILY
Refills: 0 | Status: DISCONTINUED | OUTPATIENT
Start: 2025-04-02 | End: 2025-04-02

## 2025-04-01 RX ORDER — SACUBITRIL AND VALSARTAN 6; 6 MG/1; MG/1
1 PELLET ORAL
Refills: 0 | Status: DISCONTINUED | OUTPATIENT
Start: 2025-04-01 | End: 2025-04-02

## 2025-04-01 RX ORDER — ISOSORBIDE MONONITRATE 60 MG/1
60 TABLET, EXTENDED RELEASE ORAL DAILY
Refills: 0 | Status: DISCONTINUED | OUTPATIENT
Start: 2025-04-01 | End: 2025-04-02

## 2025-04-01 RX ORDER — APIXABAN 2.5 MG/1
5 TABLET, FILM COATED ORAL EVERY 12 HOURS
Refills: 0 | Status: DISCONTINUED | OUTPATIENT
Start: 2025-04-02 | End: 2025-04-02

## 2025-04-01 RX ORDER — METOPROLOL SUCCINATE 50 MG/1
100 TABLET, EXTENDED RELEASE ORAL DAILY
Refills: 0 | Status: DISCONTINUED | OUTPATIENT
Start: 2025-04-01 | End: 2025-04-02

## 2025-04-01 RX ORDER — DAPAGLIFLOZIN 5 MG/1
10 TABLET, FILM COATED ORAL DAILY
Refills: 0 | Status: DISCONTINUED | OUTPATIENT
Start: 2025-04-02 | End: 2025-04-02

## 2025-04-01 RX ADMIN — Medication 125 MILLILITER(S): at 18:27

## 2025-04-01 RX ADMIN — ATORVASTATIN CALCIUM 40 MILLIGRAM(S): 80 TABLET, FILM COATED ORAL at 21:25

## 2025-04-01 RX ADMIN — Medication 81 MILLIGRAM(S): at 11:35

## 2025-04-01 RX ADMIN — Medication 125 MILLILITER(S): at 15:04

## 2025-04-01 RX ADMIN — RANOLAZINE 1000 MILLIGRAM(S): 1000 TABLET, FILM COATED, EXTENDED RELEASE ORAL at 18:31

## 2025-04-01 RX ADMIN — SACUBITRIL AND VALSARTAN 1 TABLET(S): 6; 6 PELLET ORAL at 17:06

## 2025-04-01 RX ADMIN — ISOSORBIDE MONONITRATE 60 MILLIGRAM(S): 60 TABLET, EXTENDED RELEASE ORAL at 17:06

## 2025-04-01 NOTE — H&P CARDIOLOGY - NSICDXFAMILYHX_GEN_ALL_CORE_FT
FAMILY HISTORY:  FH: CAD (coronary artery disease)    
FAMILY HISTORY:  FH: CAD (coronary artery disease)

## 2025-04-01 NOTE — ASU DISCHARGE PLAN (ADULT/PEDIATRIC) - ASU DC SPECIAL INSTRUCTIONSFT
Activity:  - Do not drive or operate heavy machinery for 24 hours.  - Limit your physical or any strenuous activity for 2 weeks after angioplasty and 48 hours for angiogram. Support the groin site with your hand when you sneeze or cough. No heavy lifting ( objects more then 10 pounds).  - For wrist access, avoid using affected arm for 24 hours after removal of dressing and avoid heavy lifting for 7 days.  Hygiene:  - After 24 hours, you may shower and remove the dressing from the site. Do not tub bathe for one week. Do not rub or apply lotion, cream, powder to the affected site. Leave it open to air.   Diet:   - You may resume your diet. Low Sodium. Low Fat, Low Cholesterol.  If Diabetic - Carbohydrate Consistent Diet.      - Drink extra fluid unless otherwise advised.   Special Instructions:  - Bruising or black and blue at the puncture site is possible.  - If there is bleeding from the puncture site (groin or wrist) apply direct firm pressure on the site and call 911.  - Any sudden swelling, redness, fever, discharge or severe pain, call your physician or call the cath lab.   - If you notice any scab formation in the area avoid touching the site and allow it to heal.  - Numbness or "pins and needle" sensation in the affected arm, hand, leg or if the affected site become cool to touch or pale that persist for extended period of time call your physician immediately to be checked.  - If you developed chest pain, not relieved by your usual routine medication, fainting, lethargy, weakness, report to the nearest emergency room.   - Inform your Dentist or Surgeon if you are taking Aspirin or any antiplatelet medications. Report any bleeding in your urine or stool.   Medications:  - Soreness or tenderness at the site is possible it will diminish over time. You may take Tylenol every 4-6 hours as needed. Nothing stronger is needed.  - If you are diabetic and taking medication containing Metformin, do not take them for 48 hours after the procedure.     Any questions call Cardiac Cath Lab at [phone] or [phone], Monday - Friday from 7 - 9 pm.

## 2025-04-01 NOTE — H&P CARDIOLOGY - TIME:
Patient would like communication of their results via:    [employer]  OR    Cell Phone:   Telephone Information:   Mobile [phone]     Okay to leave a message containing results? Yes  
09:26
10:08

## 2025-04-01 NOTE — ASU PREOP CHECKLIST - ASSESSMENT, HISTORY & PHYSICAL COMPLETED AND ON MEDICAL RECORD
done [General Appearance - Well Developed] : well developed [Normal Appearance] : normal appearance [General Appearance - Well Nourished] : well nourished [No Deformities] : no deformities [Neck Appearance] : the appearance of the neck was normal [Heart Rate And Rhythm] : heart rate and rhythm were normal [Heart Sounds] : normal S1 and S2 [Murmurs] : no murmurs present [Edema] : no peripheral edema present [Respiration, Rhythm And Depth] : normal respiratory rhythm and effort [Exaggerated Use Of Accessory Muscles For Inspiration] : no accessory muscle use [Lungs Percussion] : the lungs were normal to percussion [Abnormal Walk] : normal gait [] : no rash [No Focal Deficits] : no focal deficits [Oriented To Time, Place, And Person] : oriented to person, place, and time [Impaired Insight] : insight and judgment were intact [Affect] : the affect was normal [Memory Recent] : recent memory was not impaired [Nail Clubbing] : no clubbing of the fingernails [Cyanosis, Localized] : no localized cyanosis [FreeTextEntry1] : no chest wall abn

## 2025-04-01 NOTE — CHART NOTE - NSCHARTNOTEFT_GEN_A_CORE
POST OPERATIVE PROCEDURAL DOCUMENTATION  PRE-OP DIAGNOSIS: Assess MR    POST-OP DIAGNOSIS: mod MR with EROA 0,17 and RV volume 32 cc    PROCEDURE: Transesophageal Echocardiogram     Primary Physician: Dr. Puri  Cardiology Fellow: Dr Sebas Hernandez    ANESTHESIA TYPE  [  ] General Anesthesia  [ x ] Conscious Sedation  [  ] Local/Regional    CONDITION  [  ] Critical  [  ] Serious  [  ] Fair  [ x ] Good    SPECIMENS REMOVED (IF APPLICABLE): N/A    IMPLANTS (IF APPLICABLE): None    ESTIMATED BLOOD LOSS: None    COMPLICATIONS: None    After risks and benefits of procedures were explained, informed consent was obtained and placed in chart.   The patient received topical anesthetic to the oropharynx with viscous lidocaine and benzocaine spray.  Refer to Anesthesia note for sedation details.  The DAMIR probe was passed into the esophagus without difficulty.  Transesophageal and transgastric images were obtained.  The DAMIR probe was removed without difficulty and examined.  There was no evidence for bleeding.  The patient tolerated the procedure well without any immediate DAMIR-related complications.      Preliminary Findings:  LA: Mildly enlarged  LAURY: Left atrial appendage was clear of clot and smoke. Normal doppler velocities   LV: LVEF was estimated at 30-35%, dilated with RWMA  MV: mod MR centrally directed jet with tethering of the posterior leaflet, RV 32 ml, EROA 0.17,  No MS  AV: No AI, no  AS  RA: Mildly enlarged  RV: mod to severe dilation with reduced systolic function   TV: mod TR  PV: No HI, no PS, systolic blunting seen on pulse doppler   IAS: No PFO or ASD. No R-> L shunt   Aorta: There was mild, non-mobile atheroma seen in the thoracic aorta.      DIAGNOSIS/IMPRESSION:    Full report to follow    PLAN OF CARE:  [x] LHC, RHC by Dr Bolton .  [x] resume eliquis post Cath for Afib  [x] No eating or drinking for 1 hour  [x] No driving for 24 hours  [X] F/U for possible mitraclip placement     Results of procedure/ plan of care discussed with patient/  in detail. POST OPERATIVE PROCEDURAL DOCUMENTATION  PRE-OP DIAGNOSIS: Assess MR    POST-OP DIAGNOSIS: mod MR with EROA 0,17 and RV volume 32 cc    PROCEDURE: Transesophageal Echocardiogram     Primary Physician: Dr. Puri  Cardiology Fellow: Dr Sebas Hernandez    ANESTHESIA TYPE  [  ] General Anesthesia  [ x ] Conscious Sedation  [  ] Local/Regional    CONDITION  [  ] Critical  [  ] Serious  [  ] Fair  [ x ] Good    SPECIMENS REMOVED (IF APPLICABLE): N/A    IMPLANTS (IF APPLICABLE): None    ESTIMATED BLOOD LOSS: None    COMPLICATIONS: None    After risks and benefits of procedures were explained, informed consent was obtained and placed in chart.   The patient received topical anesthetic to the oropharynx with viscous lidocaine and benzocaine spray.  Refer to Anesthesia note for sedation details.  The DAMIR probe was passed into the esophagus without difficulty.  Transesophageal and transgastric images were obtained.  The DAMIR probe was removed without difficulty and examined.  There was no evidence for bleeding.  The patient tolerated the procedure well without any immediate DAMIR-related complications.      Preliminary Findings:  LA: Mildly enlarged  LAURY: Left atrial appendage was clear of clot and smoke. Normal doppler velocities   LV: LVEF was estimated at 30-35%, dilated with RWMA  MV: mod MR centrally directed jet with tethering of the posterior leaflet, RV 32 ml, EROA 0.17,  No MS  AV: No AI, no  AS  RA: Mildly enlarged. tiny blood clot 0.4 cm seen on the AICD lead  RV: mod to severe dilation with reduced systolic function   TV: mod TR  PV: No PA, no PS, systolic blunting seen on pulse doppler   IAS: No PFO or ASD. No R-> L shunt   Aorta: There was mild, non-mobile atheroma seen in the thoracic aorta.      DIAGNOSIS/IMPRESSION:    Full report to follow    PLAN OF CARE:  [x] LHC, RHC by Dr Bolton .  [x] resume eliquis post Cath for Afib  [x] No eating or drinking for 1 hour  [x] No driving for 24 hours  [X] F/U for possible mitraclip placement     Results of procedure/ plan of care discussed with patient/  in detail. POST OPERATIVE PROCEDURAL DOCUMENTATION  PRE-OP DIAGNOSIS: Assess MR    POST-OP DIAGNOSIS: mod MR with EROA 0,17 and RV volume 32 cc    PROCEDURE: Transesophageal Echocardiogram     Primary Physician: Dr. Puri  Cardiology Fellow: Dr Sebas Hernandez    ANESTHESIA TYPE  [  ] General Anesthesia  [ x ] Conscious Sedation  [  ] Local/Regional    CONDITION  [  ] Critical  [  ] Serious  [  ] Fair  [ x ] Good    SPECIMENS REMOVED (IF APPLICABLE): N/A    IMPLANTS (IF APPLICABLE): None    ESTIMATED BLOOD LOSS: None    COMPLICATIONS: None    After risks and benefits of procedures were explained, informed consent was obtained and placed in chart.   The patient received topical anesthetic to the oropharynx with viscous lidocaine and benzocaine spray.  Refer to Anesthesia note for sedation details.  The DAMIR probe was passed into the esophagus without difficulty.  Transesophageal and transgastric images were obtained.  The DAMIR probe was removed without difficulty and examined.  There was no evidence for bleeding.  The patient tolerated the procedure well without any immediate DAMIR-related complications.      Preliminary Findings:  LA: Mildly enlarged  LAURY: Left atrial appendage was clear of clot and smoke. Normal doppler velocities   LV: LVEF was estimated at 30-35%, dilated with RWMA  MV: mod MR centrally directed jet with tethering of the posterior leaflet, RV 32 ml, EROA 0.17,  No MS  AV: No AI, no  AS  RA: Mildly enlarged. tiny blood clot 0.4 cm seen on the AICD lead  RV: mod to severe dilation with reduced systolic function   TV: mod TR  PV: No NH, no PS, systolic blunting seen on pulse doppler   IAS: No PFO or ASD. No R-> L shunt   Aorta: There was mild, non-mobile atheroma seen in the thoracic aorta.      DIAGNOSIS/IMPRESSION:    Full report to follow    PLAN OF CARE:  [x] LHC, RHC by Dr Bolton .  [x] resume eliquis post Cath for Afib  [x] No eating or drinking for 1 hour  [x] No driving for 24 hours      Results of procedure/ plan of care discussed with patient/  in detail.

## 2025-04-01 NOTE — H&P CARDIOLOGY - NSICDXPASTMEDICALHX_GEN_ALL_CORE_FT
PAST MEDICAL HISTORY:  CAD (coronary artery disease)     HTN (hypertension)     Hypercholesterolemia     
PAST MEDICAL HISTORY:  CAD (coronary artery disease)     HTN (hypertension)     Hypercholesterolemia

## 2025-04-01 NOTE — ASU PATIENT PROFILE, ADULT - FALL HARM RISK - UNIVERSAL INTERVENTIONS
Bed in lowest position, wheels locked, appropriate side rails in place/Call bell, personal items and telephone in reach/Instruct patient to call for assistance before getting out of bed or chair/Non-slip footwear when patient is out of bed/[place] to call system/Physically safe environment - no spills, clutter or unnecessary equipment/Purposeful Proactive Rounding/Room/bathroom lighting operational, light cord in reach

## 2025-04-01 NOTE — H&P CARDIOLOGY - NSICDXPASTSURGICALHX_GEN_ALL_CORE_FT
PAST SURGICAL HISTORY:  AICD (automatic cardioverter/defibrillator) present     History of left heart catheterization (LHC) MULTIPLE    S/P CABG (coronary artery bypass graft) 2011 [facility]    
PAST SURGICAL HISTORY:  AICD (automatic cardioverter/defibrillator) present     History of left heart catheterization (LHC) MULTIPLE    S/P CABG (coronary artery bypass graft) 2011 [facility]

## 2025-04-01 NOTE — ASU PATIENT PROFILE, ADULT - NSICDXPASTSURGICALHX_GEN_ALL_CORE_FT
PAST SURGICAL HISTORY:  AICD (automatic cardioverter/defibrillator) present     History of left heart catheterization (LHC) MULTIPLE    S/P CABG (coronary artery bypass graft) 2011 [facility]

## 2025-04-01 NOTE — ASU DISCHARGE PLAN (ADULT/PEDIATRIC) - FINANCIAL ASSISTANCE
[facility] provides services at a reduced cost to those who are determined to be eligible through [facility]’s financial assistance program. Information regarding [facility]’s financial assistance program can be found by going to [email or web address] or by calling [phone].

## 2025-04-01 NOTE — ASU DISCHARGE PLAN (ADULT/PEDIATRIC) - CARE PROVIDER_API CALL
Villa Bolton  Interventional Cardiology  [address] [place] [address], [address]  [place] [address]  Phone: [phone]  Fax: [phone]  Follow Up Time: 2 weeks

## 2025-04-01 NOTE — H&P CARDIOLOGY - HISTORY OF PRESENT ILLNESS
74y Female PMH of CAD, CABG x 5 2011 in [place], HLD, HTN, AICD, seizure, pafib presents for DAMIR to assess MR severity  Pt reports having episodes of CARRERA that has been worsening. Patient was recently hospitalized for worsening orthopnea, LE swelling and exertional Dyspnea.  Patient presents to cardiology dept for RHc/LHC for workup for possible mitraclip

## 2025-04-02 ENCOUNTER — TRANSCRIPTION ENCOUNTER (OUTPATIENT)
Age: 75
End: 2025-04-02

## 2025-04-02 VITALS — RESPIRATION RATE: 18 BRPM | OXYGEN SATURATION: 99 %

## 2025-04-02 RX ADMIN — Medication 25 MILLIGRAM(S): at 05:33

## 2025-04-02 RX ADMIN — ISOSORBIDE MONONITRATE 60 MILLIGRAM(S): 60 TABLET, EXTENDED RELEASE ORAL at 11:21

## 2025-04-02 RX ADMIN — Medication 20 MILLIGRAM(S): at 11:21

## 2025-04-02 RX ADMIN — DAPAGLIFLOZIN 10 MILLIGRAM(S): 5 TABLET, FILM COATED ORAL at 11:21

## 2025-04-02 RX ADMIN — Medication 81 MILLIGRAM(S): at 11:21

## 2025-04-02 RX ADMIN — SACUBITRIL AND VALSARTAN 1 TABLET(S): 6; 6 PELLET ORAL at 05:34

## 2025-04-02 RX ADMIN — RANOLAZINE 1000 MILLIGRAM(S): 1000 TABLET, FILM COATED, EXTENDED RELEASE ORAL at 05:33

## 2025-04-02 RX ADMIN — METOPROLOL SUCCINATE 100 MILLIGRAM(S): 50 TABLET, EXTENDED RELEASE ORAL at 05:33

## 2025-04-02 RX ADMIN — FUROSEMIDE 40 MILLIGRAM(S): 10 INJECTION INTRAMUSCULAR; INTRAVENOUS at 05:33

## 2025-04-02 NOTE — DISCHARGE NOTE PROVIDER - NSDCMRMEDTOKEN_GEN_ALL_CORE_FT
Aspir 81 oral delayed release tablet: 1 tab(s) orally once a day  atorvastatin 40 mg oral tablet: 1 tab(s) orally once a day (at bedtime)  Eliquis 5 mg oral tablet: 1 tab(s) orally every 12 hours  Entresto 24 mg-26 mg oral tablet: 1 tab(s) orally 2 times a day  famotidine 20 mg oral tablet: 1 tab(s) orally once a day  Farxiga 10 mg oral tablet: 1 tab(s) orally once a day  isosorbide mononitrate 30 mg oral tablet, extended release: 3 tab(s) orally once a day   Lasix 40 mg oral tablet: 1 tab(s) orally once a day , Measure your weight daily and take an extra dose if you gain more than 3 pounds within 1-2 days.  metoprolol succinate 100 mg oral tablet, extended release: 1 tab(s) orally once a day  Ranexa 1000 mg oral tablet, extended release: 1 tab(s) orally 2 times a day  spironolactone 25 mg oral tablet: 1 tab(s) orally once a day

## 2025-04-02 NOTE — DISCHARGE NOTE PROVIDER - NSDCFUSCHEDAPPT_GEN_ALL_CORE_FT
[facility]  ELECTROPH [address] [place] [address]  Scheduled Appointment: 04/10/2025

## 2025-04-02 NOTE — DISCHARGE NOTE PROVIDER - CARE PROVIDER_API CALL
Villa Bolton  Interventional Cardiology  [address] [place] [address], [address]  [place] [address]  Phone: [phone]  Fax: [phone]  Follow Up Time: 2 weeks

## 2025-04-02 NOTE — DISCHARGE NOTE NURSING/CASE MANAGEMENT/SOCIAL WORK - PATIENT PORTAL LINK FT
You can access the FollowMyHealth Patient Portal offered by [facility] by registering at the following website: [email or web address]. By joining [facility]’s FollowMyHealth portal, you will also be able to view your health information using other applications (apps) compatible with our system.

## 2025-04-02 NOTE — DISCHARGE NOTE PROVIDER - NSDCFUADDINST_GEN_ALL_CORE_FT
Activity:  - Do not drive or operate heavy machinery for 24 hours.  - Limit your physical or any strenuous activity for 2 weeks after angioplasty and 48 hours for angiogram. Support the groin site with your hand when you sneeze or cough. No heavy lifting ( objects more then 10 pounds).  - For wrist access, avoid using affected arm for 24 hours after removal of dressing and avoid heavy lifting for 7 days.  Hygiene:  - After 24 hours, you may shower and remove the dressing from the site. Do not tub bathe for one week. Do not rub or apply lotion, cream, powder to the affected site. Leave it open to air.   Diet:   - You may resume your diet. Low Sodium. Low Fat, Low Cholesterol.  If Diabetic - Carbohydrate Consistent Diet.      - Drink extra fluid unless otherwise advised.   Special Instructions:  - Bruising or black and blue at the puncture site is possible.  - If there is bleeding from the puncture site (groin or wrist) apply direct firm pressure on the site and call 911.  - Any sudden swelling, redness, fever, discharge or severe pain, call your physician or call the cath lab.   - If you notice any scab formation in the area avoid touching the site and allow it to heal.  - Numbness or "pins and needle" sensation in the affected arm, hand, leg or if the affected site become cool to touch or pale that persist for extended period of time call your physician immediately to be checked.  - If you developed chest pain, not relieved by your usual routine medication, fainting, lethargy, weakness, report to the nearest emergency room.   - Inform your Dentist or Surgeon if you are taking Aspirin or any antiplatelet medications. Report any bleeding in your urine or stool.   Medications:  - Soreness or tenderness at the site is possible it will diminish over time. You may take Tylenol every 4-6 hours as needed. Nothing stronger is needed.  - If you are diabetic and taking medication containing Metformin, do not take them for 48 hours after the procedure.     Any questions call Cardiac Cath Lab at [phone] or [phone], Monday - Friday from 7 - 9 pm.

## 2025-04-02 NOTE — DISCHARGE NOTE PROVIDER - NSDCCPCAREPLAN_GEN_ALL_CORE_FT
PRINCIPAL DISCHARGE DIAGNOSIS  Diagnosis: CAD (coronary artery disease)  Assessment and Plan of Treatment: s/p DAMIR/LHC/RHC. Resume Eliquis as prescribed, continue home medication. Follow safety precations. F/up with Cardiologist in 2 weeks. Monitor right groin.

## 2025-04-02 NOTE — PROGRESS NOTE ADULT - SUBJECTIVE AND OBJECTIVE BOX
Cardiology Follow up s/p PCI    ESTELITA RUFFIN   74y Female  PAST MEDICAL & SURGICAL HISTORY:    CAD (coronary artery disease)      Hypercholesterolemia      HTN (hypertension)      S/P CABG (coronary artery bypass graft)  2011 [facility]      History of left heart catheterization (LHC)  MULTIPLE      AICD (automatic cardioverter/defibrillator) present           HPI:  74y Female PMH of CAD, CABG x 5 2011 in [facility], HLD, HTN, AICD, seizure, pafib presents for DAMIR to assess MR severity  Pt reports having episodes of CARRERA that has been worsening. Patient was recently hospitalized for worsening orthopnea, LE swelling and exertional Dyspnea.  Patient presents to cardiology dept for RHc/LHC for workup for possible mitraclip (01 Apr 2025 09:26)    Allergies    No Known Allergies    Intolerances      Patient seen and examined at bedside. No acute events overnight.  Patient without complaints. Pt ambulated without issues/symptoms  Denies CP, SOB, palpitations, or dizziness  No events on telemetry overnight    Vital Signs Last 24 Hrs  T(C): 36.6 (02 Apr 2025 04:14), Max: 36.7 (01 Apr 2025 09:24)  T(F): 97.9 (02 Apr 2025 04:14), Max: 97.9 (02 Apr 2025 04:14)  HR: 68 (02 Apr 2025 04:14) (65 - 87)  BP: 133/76 (02 Apr 2025 04:14) (133/76 - 197/101)  BP(mean): 95 (02 Apr 2025 04:14) (91 - 135)  RR: 18 (02 Apr 2025 07:59) (14 - 19)  SpO2: 99% (02 Apr 2025 07:59) (94% - 99%)    Parameters below as of 02 Apr 2025 07:59  Patient On (Oxygen Delivery Method): room air        MEDICATIONS  (STANDING):  apixaban 5 milliGRAM(s) Oral every 12 hours  aspirin enteric coated 81 milliGRAM(s) Oral daily  atorvastatin 40 milliGRAM(s) Oral at bedtime  dapagliflozin 10 milliGRAM(s) Oral daily  famotidine    Tablet 20 milliGRAM(s) Oral daily  furosemide    Tablet 40 milliGRAM(s) Oral daily  isosorbide   mononitrate ER Tablet (IMDUR) 60 milliGRAM(s) Oral daily  metoprolol succinate  milliGRAM(s) Oral daily  ranolazine 1000 milliGRAM(s) Oral two times a day  sacubitril 24 mG/valsartan 26 mG 1 Tablet(s) Oral two times a day  spironolactone 25 milliGRAM(s) Oral daily    MEDICATIONS  (PRN):      REVIEW OF SYSTEMS:          All negative except as mentioned in HPI    PHYSICAL EXAM:           CONSTITUTIONAL: Well-developed; well-nourished; in no acute distress  	SKIN: warm, dry  	HEAD: Normocephalic; atraumatic  	EYES: PERRL.  	ENT: No nasal discharge, airway clear, mucous membranes moist  	NECK: Supple; non tender.  	CARD: +S1, +S2, no murmurs, gallops, or rubs. Regular rate and rhythm    	RESP: No wheezes, rales or rhonchi. CTA B/L  	ABD: soft ntnd, + BS x 4 quadrants  	EXT: moves all extremities,  no clubbing, cyanosis or edema  	NEURO: Alert and oriented x3, no focal deficits          PSYCH: Cooperative, appropriate          VASCULAR:  + Rad / + PTs / +  DPs          EXTREMITY:             Right Groin: dressing removed, access site soft, no hematoma, no pain, + pulses, no sign of infection, no numbness  	             2D ECHO:  < from: TTE Echo Complete w/o Contrast w/ Doppler (01.07.25 @ 10:38) >  ummary:   1. Diffuse hypokinesis with severe hypokinesis/akinesis of the   ywyvf-ma-fno inferior and lateral wall segments. Overall, moderately   decreased global left ventricular systolic function with ejection   fraction, by visual estimation, is 35 to 40%. Severe (grade 3) diastolic   dysfunction.   2. Normal right ventricular size with reduced systolic function.   3. Moderately enlarged left atrium.   4. Moderate mitral valve regurgitation.   5. Moderate tricuspid regurgitation.   6. Mild pulmonary hypertension (PASP = 44mmHg).   7. There is no evidence of pericardial effusion.   8. Compared to prior study, note mild decline in systolic function. Note   that the prior study did not include echocontrast imaging.    ECG:  < from: 12 Lead ECG (01.04.25 @ 23:54) >  Ventricular Rate 62 BPM    Atrial Rate 62 BPM    P-R Interval 272 ms    QRS Duration 78 ms    Q-T Interval 426 ms    QTC Calculation(Bazett) 432 ms    P Axis 41 degrees    R Axis -19 degrees    T Axis 57 degrees    Diagnosis Line Sinus rhythm with 1st degree A-V block with occasional Premature ventricular  complexes  Minimal voltage criteria for LVH, may be normal variant ( R in aVL )  Inferior infarct (cited on or before 13-MAY-2013)  Anterior infarct , age undetermined  Abnormal ECG    Confirmed by Raul Gates ([id number]) on 1/5/2025 9:20:53 AM                                                                                                                  LABS:                          12.1   6.24  )-----------( 244      ( 01 Apr 2025 08:30 )             35.4     04-01    137  |  103  |  16  ----------------------------<  100[H]  4.7   |  23  |  0.9    Ca    9.9      01 Apr 2025 08:30      A/P:  I discussed the case with Cardiologist Dr. Bolton and recommend the following:  S/P DAMIR/RCH/LHC:                                       Ambulate patient around the unit                   Monitor access site  	     Continue home medications APT/AC, Statin Therapy, Furosemide, Isosorbide, Entresto, Ranexa, Spironolactone, Farxiga, B-Blocker, PPI                   Patient agreeing to take APT/AC as directed by cardiologist                    Post cath instructions, access site care and activity restrictions reviewed with patient                     Cardiac rehab information provided/referral and communication to Cardiac Rehab completed                      Benefits of Cardiac Rehab discussed with patient                   Patient instructed to call Cardiac Rehab and make first appointment after first f/u visit with Cardiologist                    Discussed with patient to return to hospital if experience chest pain, shortness breath, dizziness and site bleeding                   Aggressive risk factor modification, diet counseling, smoking cessation discussed with patient                       Can discharge patient from interventional cardiac standpoint after ambulating without symptoms and access site wnl, ECG and blood work reviewed                    Follow up with Cardiology Dr. Bolton in two weeks. Patient instructed to call and make an appointment                     Discharge instructions as follows, when ready to d/c:    Activity:  - Do not drive or operate heavy machinery for 24 hours.  - Limit your physical or any strenuous activity for 2 weeks after angioplasty and 48 hours for angiogram. Support the groin site with your hand when you sneeze or cough. No heavy lifting ( objects more then 10 pounds).  - For wrist access, avoid using affected arm for 24 hours after removal of dressing and avoid heavy lifting for 7 days.  Hygiene:  - After 24 hours, you may shower and remove the dressing from the site. Do not tub bathe for one week. Do not rub or apply lotion, cream, powder to the affected site. Leave it open to air.   Diet:   - You may resume your diet. Low Sodium. Low Fat, Low Cholesterol.  If Diabetic - Carbohydrate Consistent Diet.      - Drink extra fluid unless otherwise advised.   Special Instructions:  - Bruising or black and blue at the puncture site is possible.  - If there is bleeding from the puncture site (groin or wrist) apply direct firm pressure on the site and call 911.  - Any sudden swelling, redness, fever, discharge or severe pain, call your physician or call the cath lab.   - If you notice any scab formation in the area avoid touching the site and allow it to heal.  - Numbness or "pins and needle" sensation in the affected arm, hand, leg or if the affected site become cool to touch or pale that persist for extended period of time call your physician immediately to be checked.  - If you developed chest pain, not relieved by your usual routine medication, fainting, lethargy, weakness, report to the nearest emergency room.   - Inform your Dentist or Surgeon if you are taking Aspirin or any antiplatelet medications. Report any bleeding in your urine or stool.   Medications:  - Soreness or tenderness at the site is possible it will diminish over time. You may take Tylenol every 4-6 hours as needed. Nothing stronger is needed.  - If you are diabetic and taking medication containing Metformin, do not take them for 48 hours after the procedure.     Any questions call Cardiac Cath Lab at [phone] or [phone], Monday - Friday from 7 - 9 pm.                                      Cardiology Follow up s/p lhc/rhc/parrish    ESTELITA RUFFIN   74y Female  PAST MEDICAL & SURGICAL HISTORY:    CAD (coronary artery disease)      Hypercholesterolemia      HTN (hypertension)      S/P CABG (coronary artery bypass graft)  2011 [facility]      History of left heart catheterization (LHC)  MULTIPLE      AICD (automatic cardioverter/defibrillator) present           HPI:  74y Female PMH of CAD, CABG x 5 2011 in [facility], HLD, HTN, AICD, seizure, pafib presents for PARRISH to assess MR severity  Pt reports having episodes of CARRERA that has been worsening. Patient was recently hospitalized for worsening orthopnea, LE swelling and exertional Dyspnea.  Patient presents to cardiology dept for RHc/LHC for workup for possible mitraclip (01 Apr 2025 09:26)    Allergies    No Known Allergies    Intolerances      Patient seen and examined at bedside. No acute events overnight.  Patient without complaints. Pt ambulated without issues/symptoms  Denies CP, SOB, palpitations, or dizziness  No events on telemetry overnight    Vital Signs Last 24 Hrs  T(C): 36.6 (02 Apr 2025 04:14), Max: 36.7 (01 Apr 2025 09:24)  T(F): 97.9 (02 Apr 2025 04:14), Max: 97.9 (02 Apr 2025 04:14)  HR: 68 (02 Apr 2025 04:14) (65 - 87)  BP: 133/76 (02 Apr 2025 04:14) (133/76 - 197/101)  BP(mean): 95 (02 Apr 2025 04:14) (91 - 135)  RR: 18 (02 Apr 2025 07:59) (14 - 19)  SpO2: 99% (02 Apr 2025 07:59) (94% - 99%)    Parameters below as of 02 Apr 2025 07:59  Patient On (Oxygen Delivery Method): room air        MEDICATIONS  (STANDING):  apixaban 5 milliGRAM(s) Oral every 12 hours  aspirin enteric coated 81 milliGRAM(s) Oral daily  atorvastatin 40 milliGRAM(s) Oral at bedtime  dapagliflozin 10 milliGRAM(s) Oral daily  famotidine    Tablet 20 milliGRAM(s) Oral daily  furosemide    Tablet 40 milliGRAM(s) Oral daily  isosorbide   mononitrate ER Tablet (IMDUR) 60 milliGRAM(s) Oral daily  metoprolol succinate  milliGRAM(s) Oral daily  ranolazine 1000 milliGRAM(s) Oral two times a day  sacubitril 24 mG/valsartan 26 mG 1 Tablet(s) Oral two times a day  spironolactone 25 milliGRAM(s) Oral daily    MEDICATIONS  (PRN):      REVIEW OF SYSTEMS:          All negative except as mentioned in HPI    PHYSICAL EXAM:           CONSTITUTIONAL: Well-developed; well-nourished; in no acute distress  	SKIN: warm, dry  	HEAD: Normocephalic; atraumatic  	EYES: PERRL.  	ENT: No nasal discharge, airway clear, mucous membranes moist  	NECK: Supple; non tender.  	CARD: +S1, +S2, no murmurs, gallops, or rubs. Regular rate and rhythm    	RESP: No wheezes, rales or rhonchi. CTA B/L  	ABD: soft ntnd, + BS x 4 quadrants  	EXT: moves all extremities,  no clubbing, cyanosis or edema  	NEURO: Alert and oriented x3, no focal deficits          PSYCH: Cooperative, appropriate          VASCULAR:  + Rad / + PTs / +  DPs          EXTREMITY:             Right Groin: dressing removed, access site soft, no hematoma, no pain, + pulses, no sign of infection, no numbness  	             2D ECHO:  < from: TTE Echo Complete w/o Contrast w/ Doppler (01.07.25 @ 10:38) >  ummary:   1. Diffuse hypokinesis with severe hypokinesis/akinesis of the   lttbc-ch-ilp inferior and lateral wall segments. Overall, moderately   decreased global left ventricular systolic function with ejection   fraction, by visual estimation, is 35 to 40%. Severe (grade 3) diastolic   dysfunction.   2. Normal right ventricular size with reduced systolic function.   3. Moderately enlarged left atrium.   4. Moderate mitral valve regurgitation.   5. Moderate tricuspid regurgitation.   6. Mild pulmonary hypertension (PASP = 44mmHg).   7. There is no evidence of pericardial effusion.   8. Compared to prior study, note mild decline in systolic function. Note   that the prior study did not include echocontrast imaging.    ECG:  < from: 12 Lead ECG (01.04.25 @ 23:54) >  Ventricular Rate 62 BPM    Atrial Rate 62 BPM    P-R Interval 272 ms    QRS Duration 78 ms    Q-T Interval 426 ms    QTC Calculation(Bazett) 432 ms    P Axis 41 degrees    R Axis -19 degrees    T Axis 57 degrees    Diagnosis Line Sinus rhythm with 1st degree A-V block with occasional Premature ventricular  complexes  Minimal voltage criteria for LVH, may be normal variant ( R in aVL )  Inferior infarct (cited on or before 13-MAY-2013)  Anterior infarct , age undetermined  Abnormal ECG    Confirmed by Raul Gates ([id number]) on 1/5/2025 9:20:53 AM                                                                                                                  LABS:                          12.1   6.24  )-----------( 244      ( 01 Apr 2025 08:30 )             35.4     04-01    137  |  103  |  16  ----------------------------<  100[H]  4.7   |  23  |  0.9    Ca    9.9      01 Apr 2025 08:30      A/P:  I discussed the case with Cardiologist Dr. Bolton and recommend the following:  S/P PARRISH/RCH/LHC:                                       Zack  #[id number] used                    Ambulate patient around the unit                   Monitor access site  	     Continue home medications APT/AC, Statin Therapy, Furosemide, Isosorbide, Entresto, Ranexa, Spironolactone, Farxiga, B-Blocker, PPI                   Patient agreeing to take APT/AC as directed by cardiologist                    Post cath instructions, access site care and activity restrictions reviewed with patient                     Cardiac rehab information provided/referral and communication to Cardiac Rehab completed                      Benefits of Cardiac Rehab discussed with patient                   Patient instructed to call Cardiac Rehab and make first appointment after first f/u visit with Cardiologist                    Discussed with patient to return to hospital if experience chest pain, shortness breath, dizziness and site bleeding                   Aggressive risk factor modification, diet counseling, smoking cessation discussed with patient                       Can discharge patient from interventional cardiac standpoint after ambulating without symptoms and access site wnl, ECG and blood work reviewed                    Follow up with Cardiology Dr. Bolton in two weeks. Patient instructed to call and make an appointment                     Discharge instructions as follows, when ready to d/c:    Activity:  - Do not drive or operate heavy machinery for 24 hours.  - Limit your physical or any strenuous activity for 2 weeks after angioplasty and 48 hours for angiogram. Support the groin site with your hand when you sneeze or cough. No heavy lifting ( objects more then 10 pounds).  - For wrist access, avoid using affected arm for 24 hours after removal of dressing and avoid heavy lifting for 7 days.  Hygiene:  - After 24 hours, you may shower and remove the dressing from the site. Do not tub bathe for one week. Do not rub or apply lotion, cream, powder to the affected site. Leave it open to air.   Diet:   - You may resume your diet. Low Sodium. Low Fat, Low Cholesterol.  If Diabetic - Carbohydrate Consistent Diet.      - Drink extra fluid unless otherwise advised.   Special Instructions:  - Bruising or black and blue at the puncture site is possible.  - If there is bleeding from the puncture site (groin or wrist) apply direct firm pressure on the site and call 911.  - Any sudden swelling, redness, fever, discharge or severe pain, call your physician or call the cath lab.   - If you notice any scab formation in the area avoid touching the site and allow it to heal.  - Numbness or "pins and needle" sensation in the affected arm, hand, leg or if the affected site become cool to touch or pale that persist for extended period of time call your physician immediately to be checked.  - If you developed chest pain, not relieved by your usual routine medication, fainting, lethargy, weakness, report to the nearest emergency room.   - Inform your Dentist or Surgeon if you are taking Aspirin or any antiplatelet medications. Report any bleeding in your urine or stool.   Medications:  - Soreness or tenderness at the site is possible it will diminish over time. You may take Tylenol every 4-6 hours as needed. Nothing stronger is needed.  - If you are diabetic and taking medication containing Metformin, do not take them for 48 hours after the procedure.     Any questions call Cardiac Cath Lab at [phone] or [phone], Monday - Friday from 7 - 9 pm.

## 2025-04-02 NOTE — DISCHARGE NOTE NURSING/CASE MANAGEMENT/SOCIAL WORK - FINANCIAL ASSISTANCE
[facility] provides services at a reduced cost to those who are determined to be eligible through [facility]’s financial assistance program. Information regarding [facility]’s financial assistance program can be found by going to [email or web address] or by calling [phone].

## 2025-04-02 NOTE — DISCHARGE NOTE PROVIDER - HOSPITAL COURSE
ESTELITA RUFFIN   74y Female  PAST MEDICAL & SURGICAL HISTORY:    CAD (coronary artery disease)    Hypercholesterolemia    HTN (hypertension)    S/P CABG (coronary artery bypass graft)  2011 [facility]    History of left heart catheterization (LHC)  MULTIPLE    AICD (automatic cardioverter/defibrillator) present      HPI:  74y Female PMH of CAD, CABG x 5 2011 in [facility], HLD, HTN, AICD, seizure, pafib presents for DAMIR to assess MR severity  Pt reports having episodes of CARRERA that has been worsening. Patient was recently hospitalized for worsening orthopnea, LE swelling and exertional Dyspnea.  Patient presents to cardiology dept for RHc/LHC for workup for possible mitraclip      VASCULAR:  + Rad / + PTs / +  DPs          EXTREMITY:             Right Groin: dressing removed, access site soft, no hematoma, no pain, + pulses, no sign of infection, no numbness      	  Continue home medications APT/AC, Statin Therapy, Furosemide, Isosorbide, Entresto, Ranexa, Spironolactone, Farxiga, B-Blocker, PPI                   Patient agreeing to take APT/AC as directed by cardiologist                    Post cath instructions, access site care and activity restrictions reviewed with patient                     Cardiac rehab information provided/referral and communication to Cardiac Rehab completed                      Benefits of Cardiac Rehab discussed with patient                   Patient instructed to call Cardiac Rehab and make first appointment after first f/u visit with Cardiologist                    Discussed with patient to return to hospital if experience chest pain, shortness breath, dizziness and site bleeding                   Aggressive risk factor modification, diet counseling, smoking cessation discussed with patient                       Can discharge patient from interventional cardiac standpoint after ambulating without symptoms and access site wnl, ECG and blood work reviewed                    Follow up with Cardiology Dr. Bolton in two weeks. Patient instructed to call and make an appointment

## 2025-04-02 NOTE — DISCHARGE NOTE PROVIDER - DISCHARGE SERVICE FOR PATIENT
on the discharge service for the patient. I have reviewed and made amendments to the documentation where necessary.
<-- Click to add NO pertinent Past Medical History
no

## 2025-04-02 NOTE — DISCHARGE NOTE NURSING/CASE MANAGEMENT/SOCIAL WORK - NSDCPEFALRISK_GEN_ALL_CORE
For information on Fall & Injury Prevention, visit: [email or web address] OR  [email or web address] OR  https://www.cdc.gov/steadi/patient.html

## 2025-04-10 ENCOUNTER — APPOINTMENT (OUTPATIENT)
Dept: ELECTROPHYSIOLOGY | Facility: CLINIC | Age: 75
End: 2025-04-10

## 2025-04-10 VITALS
HEART RATE: 76 BPM | BODY MASS INDEX: 32.39 KG/M2 | SYSTOLIC BLOOD PRESSURE: 90 MMHG | DIASTOLIC BLOOD PRESSURE: 60 MMHG | WEIGHT: 176 LBS | HEIGHT: 62 IN

## 2025-04-10 DIAGNOSIS — I48.91 UNSPECIFIED ATRIAL FIBRILLATION: ICD-10-CM

## 2025-04-10 PROCEDURE — 93282 PRGRMG EVAL IMPLANTABLE DFB: CPT

## 2025-04-10 PROCEDURE — 99214 OFFICE O/P EST MOD 30 MIN: CPT

## 2025-04-10 PROCEDURE — G2211 COMPLEX E/M VISIT ADD ON: CPT | Mod: NC

## 2025-04-10 PROCEDURE — 93290 INTERROG DEV EVAL ICPMS IP: CPT

## 2025-04-10 RX ORDER — DAPAGLIFLOZIN 5 MG/1
1 TABLET, FILM COATED ORAL
Refills: 0 | DISCHARGE

## 2025-04-24 PROBLEM — I47.20 PAROXYSMAL VENTRICULAR TACHYCARDIA: Status: ACTIVE | Noted: 2025-04-24

## 2025-04-24 PROBLEM — I07.1 TRICUSPID REGURGITATION: Status: ACTIVE | Noted: 2025-04-24

## 2025-05-01 ENCOUNTER — APPOINTMENT (OUTPATIENT)
Dept: CARDIOLOGY | Facility: CLINIC | Age: 75
End: 2025-05-01
Payer: MEDICAID

## 2025-05-01 VITALS
HEART RATE: 67 BPM | RESPIRATION RATE: 16 BRPM | DIASTOLIC BLOOD PRESSURE: 64 MMHG | TEMPERATURE: 97.8 F | SYSTOLIC BLOOD PRESSURE: 103 MMHG | OXYGEN SATURATION: 96 % | BODY MASS INDEX: 32.39 KG/M2 | WEIGHT: 176 LBS | HEIGHT: 62 IN

## 2025-05-01 DIAGNOSIS — I10 ESSENTIAL (PRIMARY) HYPERTENSION: ICD-10-CM

## 2025-05-01 DIAGNOSIS — I25.10 ATHEROSCLEROTIC HEART DISEASE OF NATIVE CORONARY ARTERY W/OUT ANGINA PECTORIS: ICD-10-CM

## 2025-05-01 DIAGNOSIS — I48.91 UNSPECIFIED ATRIAL FIBRILLATION: ICD-10-CM

## 2025-05-01 DIAGNOSIS — I50.22 CHRONIC SYSTOLIC (CONGESTIVE) HEART FAILURE: ICD-10-CM

## 2025-05-01 PROCEDURE — 99214 OFFICE O/P EST MOD 30 MIN: CPT

## 2025-05-01 RX ORDER — NITROGLYCERIN 400 UG/1
0.4 SPRAY ORAL
Qty: 1 | Refills: 3 | Status: ACTIVE | COMMUNITY
Start: 2025-05-01 | End: 1900-01-01

## 2025-05-28 ENCOUNTER — OUTPATIENT (OUTPATIENT)
Dept: OUTPATIENT SERVICES | Facility: HOSPITAL | Age: 75
LOS: 1 days | End: 2025-05-28

## 2025-05-28 DIAGNOSIS — Z95.810 PRESENCE OF AUTOMATIC (IMPLANTABLE) CARDIAC DEFIBRILLATOR: Chronic | ICD-10-CM

## 2025-05-28 DIAGNOSIS — Z98.890 OTHER SPECIFIED POSTPROCEDURAL STATES: Chronic | ICD-10-CM

## 2025-05-28 DIAGNOSIS — Z00.00 ENCOUNTER FOR GENERAL ADULT MEDICAL EXAMINATION WITHOUT ABNORMAL FINDINGS: ICD-10-CM

## 2025-05-28 DIAGNOSIS — Z95.1 PRESENCE OF AORTOCORONARY BYPASS GRAFT: Chronic | ICD-10-CM

## 2025-05-29 DIAGNOSIS — Z00.00 ENCOUNTER FOR GENERAL ADULT MEDICAL EXAMINATION WITHOUT ABNORMAL FINDINGS: ICD-10-CM

## 2025-06-14 NOTE — ED CDU PROVIDER INITIAL DAY NOTE - MEDICAL DECISION MAKING DETAILS
70yo woman h/o HLD, HTN, CAD s/p CABG in 1/2020 was placed in CDU for chest pain after an initial EKG and trop were unremarkable. Pt was seen by the cardiology fellow and by Dr Bolton; they adjusted her medications (increased Imdur), plan is for serial EKG and enzymes and likely discharge. no

## 2025-06-19 ENCOUNTER — APPOINTMENT (OUTPATIENT)
Dept: CARDIOLOGY | Facility: CLINIC | Age: 75
End: 2025-06-19

## 2025-06-20 ENCOUNTER — NON-APPOINTMENT (OUTPATIENT)
Age: 75
End: 2025-06-20

## 2025-06-20 ENCOUNTER — APPOINTMENT (OUTPATIENT)
Dept: CARDIOLOGY | Facility: CLINIC | Age: 75
End: 2025-06-20

## 2025-06-20 VITALS
WEIGHT: 187 LBS | SYSTOLIC BLOOD PRESSURE: 108 MMHG | BODY MASS INDEX: 34.41 KG/M2 | HEART RATE: 55 BPM | HEIGHT: 62 IN | DIASTOLIC BLOOD PRESSURE: 70 MMHG

## 2025-06-20 PROCEDURE — 93000 ELECTROCARDIOGRAM COMPLETE: CPT

## 2025-06-20 PROCEDURE — 99214 OFFICE O/P EST MOD 30 MIN: CPT

## 2025-06-20 RX ORDER — ATORVASTATIN CALCIUM 40 MG/1
40 TABLET, FILM COATED ORAL DAILY
Qty: 90 | Refills: 3 | Status: ACTIVE | COMMUNITY

## 2025-07-10 ENCOUNTER — APPOINTMENT (OUTPATIENT)
Dept: CARDIOLOGY | Facility: CLINIC | Age: 75
End: 2025-07-10

## 2025-07-11 ENCOUNTER — APPOINTMENT (OUTPATIENT)
Dept: CARDIOLOGY | Facility: CLINIC | Age: 75
End: 2025-07-11
Payer: MEDICAID

## 2025-07-11 PROCEDURE — 93923 UPR/LXTR ART STDY 3+ LVLS: CPT

## 2025-07-17 ENCOUNTER — EMERGENCY (EMERGENCY)
Facility: HOSPITAL | Age: 75
LOS: 0 days | Discharge: HOME | End: 2025-07-17
Attending: EMERGENCY MEDICINE
Payer: MEDICAID

## 2025-07-17 VITALS
WEIGHT: 187.39 LBS | SYSTOLIC BLOOD PRESSURE: 105 MMHG | DIASTOLIC BLOOD PRESSURE: 48 MMHG | HEART RATE: 62 BPM | RESPIRATION RATE: 20 BRPM | HEIGHT: 61.81 IN | OXYGEN SATURATION: 96 % | TEMPERATURE: 98 F

## 2025-07-17 DIAGNOSIS — Z98.890 OTHER SPECIFIED POSTPROCEDURAL STATES: Chronic | ICD-10-CM

## 2025-07-17 DIAGNOSIS — Z95.810 PRESENCE OF AUTOMATIC (IMPLANTABLE) CARDIAC DEFIBRILLATOR: Chronic | ICD-10-CM

## 2025-07-17 DIAGNOSIS — Z95.1 PRESENCE OF AORTOCORONARY BYPASS GRAFT: Chronic | ICD-10-CM

## 2025-07-17 LAB
ALBUMIN SERPL ELPH-MCNC: 4.1 G/DL — SIGNIFICANT CHANGE UP (ref 3.5–5.2)
ALP SERPL-CCNC: 97 U/L — SIGNIFICANT CHANGE UP (ref 30–115)
ALT FLD-CCNC: 23 U/L — SIGNIFICANT CHANGE UP (ref 0–41)
ANION GAP SERPL CALC-SCNC: 14 MMOL/L — SIGNIFICANT CHANGE UP (ref 7–14)
AST SERPL-CCNC: 36 U/L — SIGNIFICANT CHANGE UP (ref 0–41)
BASOPHILS # BLD AUTO: 0.08 K/UL — SIGNIFICANT CHANGE UP (ref 0–0.2)
BASOPHILS NFR BLD AUTO: 1.7 % — HIGH (ref 0–1)
BILIRUB SERPL-MCNC: 0.9 MG/DL — SIGNIFICANT CHANGE UP (ref 0.2–1.2)
BUN SERPL-MCNC: 21 MG/DL — HIGH (ref 10–20)
CALCIUM SERPL-MCNC: 9.2 MG/DL — SIGNIFICANT CHANGE UP (ref 8.4–10.5)
CHLORIDE SERPL-SCNC: 98 MMOL/L — SIGNIFICANT CHANGE UP (ref 98–110)
CO2 SERPL-SCNC: 21 MMOL/L — SIGNIFICANT CHANGE UP (ref 17–32)
CREAT SERPL-MCNC: 0.9 MG/DL — SIGNIFICANT CHANGE UP (ref 0.7–1.5)
EGFR: 67 ML/MIN/1.73M2 — SIGNIFICANT CHANGE UP
EGFR: 67 ML/MIN/1.73M2 — SIGNIFICANT CHANGE UP
EOSINOPHIL # BLD AUTO: 0 K/UL — SIGNIFICANT CHANGE UP (ref 0–0.7)
EOSINOPHIL NFR BLD AUTO: 0 % — SIGNIFICANT CHANGE UP (ref 0–8)
GLUCOSE SERPL-MCNC: 103 MG/DL — HIGH (ref 70–99)
HCT VFR BLD CALC: 33.3 % — LOW (ref 37–47)
HGB BLD-MCNC: 11.1 G/DL — LOW (ref 12–16)
LYMPHOCYTES # BLD AUTO: 1.39 K/UL — SIGNIFICANT CHANGE UP (ref 1.2–3.4)
LYMPHOCYTES # BLD AUTO: 28.5 % — SIGNIFICANT CHANGE UP (ref 20.5–51.1)
MCHC RBC-ENTMCNC: 33.2 PG — HIGH (ref 27–31)
MCHC RBC-ENTMCNC: 33.3 G/DL — SIGNIFICANT CHANGE UP (ref 32–37)
MCV RBC AUTO: 99.7 FL — HIGH (ref 81–99)
MONOCYTES # BLD AUTO: 0.67 K/UL — HIGH (ref 0.1–0.6)
MONOCYTES NFR BLD AUTO: 13.8 % — HIGH (ref 1.7–9.3)
NEUTROPHILS # BLD AUTO: 2.74 K/UL — SIGNIFICANT CHANGE UP (ref 1.4–6.5)
NEUTROPHILS NFR BLD AUTO: 56 % — SIGNIFICANT CHANGE UP (ref 42.2–75.2)
NT-PROBNP SERPL-SCNC: 2552 PG/ML — HIGH (ref 0–300)
PLATELET # BLD AUTO: 229 K/UL — SIGNIFICANT CHANGE UP (ref 130–400)
PMV BLD: 10.1 FL — SIGNIFICANT CHANGE UP (ref 7.4–10.4)
POTASSIUM SERPL-MCNC: 3.8 MMOL/L — SIGNIFICANT CHANGE UP (ref 3.5–5)
POTASSIUM SERPL-SCNC: 3.8 MMOL/L — SIGNIFICANT CHANGE UP (ref 3.5–5)
PROT SERPL-MCNC: 6.8 G/DL — SIGNIFICANT CHANGE UP (ref 6–8)
RBC # BLD: 3.34 M/UL — LOW (ref 4.2–5.4)
RBC # FLD: 16.2 % — HIGH (ref 11.5–14.5)
SODIUM SERPL-SCNC: 133 MMOL/L — LOW (ref 135–146)
TROPONIN T, HIGH SENSITIVITY RESULT: 18 NG/L — HIGH (ref 6–13)
WBC # BLD: 4.89 K/UL — SIGNIFICANT CHANGE UP (ref 4.8–10.8)
WBC # FLD AUTO: 4.89 K/UL — SIGNIFICANT CHANGE UP (ref 4.8–10.8)

## 2025-07-17 PROCEDURE — 71045 X-RAY EXAM CHEST 1 VIEW: CPT | Mod: 26

## 2025-07-17 PROCEDURE — 36415 COLL VENOUS BLD VENIPUNCTURE: CPT

## 2025-07-17 PROCEDURE — 93308 TTE F-UP OR LMTD: CPT

## 2025-07-17 PROCEDURE — 85025 COMPLETE CBC W/AUTO DIFF WBC: CPT

## 2025-07-17 PROCEDURE — 76604 US EXAM CHEST: CPT

## 2025-07-17 PROCEDURE — 83880 ASSAY OF NATRIURETIC PEPTIDE: CPT

## 2025-07-17 PROCEDURE — 36000 PLACE NEEDLE IN VEIN: CPT | Mod: XU

## 2025-07-17 PROCEDURE — 93308 TTE F-UP OR LMTD: CPT | Mod: 26

## 2025-07-17 PROCEDURE — 99285 EMERGENCY DEPT VISIT HI MDM: CPT | Mod: 25

## 2025-07-17 PROCEDURE — 99285 EMERGENCY DEPT VISIT HI MDM: CPT

## 2025-07-17 PROCEDURE — 76604 US EXAM CHEST: CPT | Mod: 26

## 2025-07-17 PROCEDURE — 93005 ELECTROCARDIOGRAM TRACING: CPT

## 2025-07-17 PROCEDURE — 93010 ELECTROCARDIOGRAM REPORT: CPT

## 2025-07-17 PROCEDURE — 84484 ASSAY OF TROPONIN QUANT: CPT

## 2025-07-17 PROCEDURE — 80053 COMPREHEN METABOLIC PANEL: CPT

## 2025-07-17 PROCEDURE — 71045 X-RAY EXAM CHEST 1 VIEW: CPT

## 2025-07-17 NOTE — ED ADULT NURSE NOTE - NSFALLHARMRISKINTERV_ED_ALL_ED

## 2025-07-17 NOTE — ED PROVIDER NOTE - PHYSICAL EXAMINATION
CONSTITUTIONAL: well-appearing, well nourished, non-toxic, NAD  SKIN: no lesions, rashes, erythema   HEAD: NCAT  EYES: EOMI, PERRLA, no scleral icterus, pink conjunctiva  ENT: Moist mucous membranes, normal pharynx with no erythema or exudates  NECK: non tender. Full ROM. No cervical LAD  CARD: RRR, cap refill < 2seconds  RESP: clear to ausculation b/l  ABD: soft, non-tender  EXT: Full ROM, no bony tenderness, no pedal edema, no calf tenderness  NEURO: normal motor. normal sensory. Normal gait.  PSYCH: Cooperative, appropriate. CONSTITUTIONAL: well-appearing, well nourished, non-toxic, NAD  SKIN: no lesions, rashes, erythema   HEAD: NCAT  EYES: EOMI, PERRLA, no scleral icterus, pink conjunctiva  ENT: Moist mucous membranes, normal pharynx with no erythema or exudates  NECK: non tender. Full ROM. No cervical LAD  CARD: RRR, cap refill < 2seconds  RESP: clear to ausculation b/l  ABD: soft, non-tender  EXT: Full ROM, no bony tenderness, 2+ pitting edema, no calf tenderness, warmth, erythema  NEURO: normal motor. normal sensory. Normal gait.  PSYCH: Cooperative, appropriate.

## 2025-07-17 NOTE — ED PROVIDER NOTE - PATIENT PORTAL LINK FT
You can access the FollowMyHealth Patient Portal offered by [facility] by registering at the following website: [email or web address]. By joining [employer]’s FollowMyHealth portal, you will also be able to view your health information using other applications (apps) compatible with our system.

## 2025-07-17 NOTE — ED PROVIDER NOTE - OBJECTIVE STATEMENT
75-year-old female with past medical history of hypertension, hypercholesterolemia, coronary artery disease, AICD placement (2016), CABG (in [place] 2011) presents for SOB when laying down (not different from her usual SOB), 1 episode of vomiting this morning (NBNB) and blood pressure of 175/85.  Patient states she did not take her blood pressure medication at that time.  After taking blood pressure meds blood pressure reduced to 117 systolic. Pt notes she usually has nausea and an episode of vomit when she does not take her blood pressure meds and her blood pressure is high. Denies any trauma, recent travels, history of blood clots. Daughter at bedside notes she is traveling soon and no one will be at home with her mother for 2 days and brought in mom to reassure that nothing acute is happening. Patient denies fevers. chills, heart palpitations, chest pain, facial pain, arm pain, abdominal pain, diarrhea. Patient states nausea has resolved and did not have another episode of vomiting. 75-year-old female with past medical history of hypertension, hypercholesterolemia, coronary artery disease, AICD placement (2016), CABG (in [place] 2011) presents for SOB when laying down (not different from her usual SOB), 1 episode of vomiting this morning (NBNB) and blood pressure of 175/85 at 530AM today.  Patient states she did not take her blood pressure medication at that time.  After taking blood pressure meds blood pressure reduced to 117 systolic. Pt notes she usually has nausea and an episode of vomit when she does not take her blood pressure meds and her blood pressure is high. Denies any trauma, recent travels, history of blood clots. Pt is also complaining of R lower leg pain that is disturbing her sleep after her gabapentin was stopped by her Cardiologist (Dr. Green). Daughter at bedside notes she is traveling soon and no one will be at home with her mother for 2 days and brought in mom to reassure that nothing acute is happening. Patient denies fevers. chills, heart palpitations, chest pain, facial pain, arm pain, abdominal pain, diarrhea. Patient states nausea has resolved and did not have another episode of vomiting.

## 2025-07-17 NOTE — ED PROVIDER NOTE - NSFOLLOWUPINSTRUCTIONS_ED_ALL_ED_FT
1. INCREASE YOUR LASIX DOSE TO 60MG DAILY FOR THE NEXT 3 DAYS, THEN GO BACK TO YOUR NORMAL DOSE  2. RESTART GABAPENTIN, BUT MAKE AN APPOINTMENT WITH DR ZAPATA TO DISCUSS PAIN MEDS  3, FOLLOW UP WITH DR BOLDEN AS SCHEDULED  4. RETURN TO THE ED FOR PERSISTENT LEG SWELLING, INCREASING SHORTNESS OF BREATH, CHEST PAIN, OR ANY OTHER PROBLEMS    WHAT YOU NEED TO KNOW:    Leg edema is swelling caused by fluid buildup. Your legs may swell if you sit or stand for long periods of time, are pregnant, or are injured. Swelling may also occur if you have heart failure or circulation problems. This means that your heart does not pump blood through your body as it should.    DISCHARGE INSTRUCTIONS:    Self-care:     Elevate your legs: Raise your legs above the level of your heart as often as you can. This will help decrease swelling and pain. Prop your legs on pillows or blankets to keep them elevated comfortably.      Wear pressure stockings: These tight stockings put pressure on your legs to promote blood flow and prevent blood clots. Wear the stockings during the day. Do not wear them while you sleep.      Apply heat: Heat helps decrease pain and swelling. Apply heat on the area for 20 to 30 minutes every 2 hours for as many days as directed.       Stay active: Do not stand or sit for long periods of time. Ask your healthcare provider about the best exercise plan for you.      Eat healthy foods: Healthy foods include fruits, vegetables, whole-grain breads, low-fat dairy products, beans, lean meats, and fish. Ask if you need to be on a special diet. Limit salt. Salt will make your body hold even more fluid.    Follow up with your healthcare provider as directed: Write down your questions so you remember to ask them during your visits.     Contact your healthcare provider if:     You have a fever or feel more tired than usual.      The veins in your legs look larger than usual. They may look full or bulging.      Your legs itch or feel heavy.      You have red or white areas or sores on your legs. The skin may also appear dimpled or have indentations.      You are gaining weight.      You have trouble moving your ankles.      The swelling does not go away, or other parts of your body swell.      You have questions or concerns about your condition or care.    Return to the emergency department if:     You cannot walk.      You feel faint or confused.       Your skin turns blue or gray.      Your leg feels warm, tender, and painful. It may be swollen and red.      You have chest pain or trouble breathing that is worse when you lie down.      You suddenly feel lightheaded and have trouble breathing.      You have new and sudden chest pain. You may have more pain when you take deep breaths or cough. You may also cough up blood.

## 2025-08-11 ENCOUNTER — APPOINTMENT (OUTPATIENT)
Dept: CARDIOLOGY | Facility: CLINIC | Age: 75
End: 2025-08-11

## 2025-08-18 ENCOUNTER — APPOINTMENT (OUTPATIENT)
Dept: CARDIOLOGY | Facility: CLINIC | Age: 75
End: 2025-08-18
Payer: MEDICAID

## 2025-08-18 VITALS
HEIGHT: 62 IN | BODY MASS INDEX: 31.83 KG/M2 | SYSTOLIC BLOOD PRESSURE: 126 MMHG | DIASTOLIC BLOOD PRESSURE: 75 MMHG | HEART RATE: 83 BPM | WEIGHT: 173 LBS

## 2025-08-18 DIAGNOSIS — I34.0 NONRHEUMATIC MITRAL (VALVE) INSUFFICIENCY: ICD-10-CM

## 2025-08-18 DIAGNOSIS — I25.5 ISCHEMIC CARDIOMYOPATHY: ICD-10-CM

## 2025-08-18 PROCEDURE — 93000 ELECTROCARDIOGRAM COMPLETE: CPT

## 2025-08-18 PROCEDURE — 99213 OFFICE O/P EST LOW 20 MIN: CPT

## 2025-08-20 ENCOUNTER — OUTPATIENT (OUTPATIENT)
Dept: OUTPATIENT SERVICES | Facility: HOSPITAL | Age: 75
LOS: 1 days | End: 2025-08-20
Payer: MEDICAID

## 2025-08-20 ENCOUNTER — APPOINTMENT (OUTPATIENT)
Dept: INTERNAL MEDICINE | Facility: CLINIC | Age: 75
End: 2025-08-20

## 2025-08-20 VITALS
WEIGHT: 174.56 LBS | HEART RATE: 88 BPM | SYSTOLIC BLOOD PRESSURE: 98 MMHG | DIASTOLIC BLOOD PRESSURE: 57 MMHG | BODY MASS INDEX: 32.12 KG/M2 | OXYGEN SATURATION: 95 % | TEMPERATURE: 97.4 F | HEIGHT: 62 IN

## 2025-08-20 DIAGNOSIS — I50.22 CHRONIC SYSTOLIC (CONGESTIVE) HEART FAILURE: ICD-10-CM

## 2025-08-20 DIAGNOSIS — Z98.890 OTHER SPECIFIED POSTPROCEDURAL STATES: Chronic | ICD-10-CM

## 2025-08-20 DIAGNOSIS — Z95.810 PRESENCE OF AUTOMATIC (IMPLANTABLE) CARDIAC DEFIBRILLATOR: ICD-10-CM

## 2025-08-20 DIAGNOSIS — I10 ESSENTIAL (PRIMARY) HYPERTENSION: ICD-10-CM

## 2025-08-20 DIAGNOSIS — Z00.00 ENCOUNTER FOR GENERAL ADULT MEDICAL EXAMINATION WITHOUT ABNORMAL FINDINGS: ICD-10-CM

## 2025-08-20 DIAGNOSIS — I25.10 ATHEROSCLEROTIC HEART DISEASE OF NATIVE CORONARY ARTERY W/OUT ANGINA PECTORIS: ICD-10-CM

## 2025-08-20 DIAGNOSIS — Z95.1 PRESENCE OF AORTOCORONARY BYPASS GRAFT: Chronic | ICD-10-CM

## 2025-08-20 DIAGNOSIS — I48.91 UNSPECIFIED ATRIAL FIBRILLATION: ICD-10-CM

## 2025-08-20 DIAGNOSIS — Z95.810 PRESENCE OF AUTOMATIC (IMPLANTABLE) CARDIAC DEFIBRILLATOR: Chronic | ICD-10-CM

## 2025-08-20 DIAGNOSIS — M79.2 NEURALGIA AND NEURITIS, UNSPECIFIED: ICD-10-CM

## 2025-08-20 DIAGNOSIS — E78.5 HYPERLIPIDEMIA, UNSPECIFIED: ICD-10-CM

## 2025-08-20 DIAGNOSIS — K21.9 GASTRO-ESOPHAGEAL REFLUX DISEASE W/OUT ESOPHAGITIS: ICD-10-CM

## 2025-08-20 PROCEDURE — 99204 OFFICE O/P NEW MOD 45 MIN: CPT

## 2025-08-20 PROCEDURE — 99214 OFFICE O/P EST MOD 30 MIN: CPT

## 2025-08-20 PROCEDURE — G2211 COMPLEX E/M VISIT ADD ON: CPT | Mod: NC

## 2025-08-20 PROCEDURE — T1013: CPT

## 2025-08-20 RX ORDER — DAPAGLIFLOZIN 10 MG/1
10 TABLET, FILM COATED ORAL DAILY
Qty: 30 | Refills: 3 | Status: ACTIVE | COMMUNITY

## 2025-08-29 DIAGNOSIS — I50.22 CHRONIC SYSTOLIC (CONGESTIVE) HEART FAILURE: ICD-10-CM

## 2025-08-29 DIAGNOSIS — Z95.810 PRESENCE OF AUTOMATIC (IMPLANTABLE) CARDIAC DEFIBRILLATOR: ICD-10-CM

## 2025-08-29 DIAGNOSIS — I10 ESSENTIAL (PRIMARY) HYPERTENSION: ICD-10-CM

## 2025-08-29 DIAGNOSIS — E78.5 HYPERLIPIDEMIA, UNSPECIFIED: ICD-10-CM

## 2025-08-29 DIAGNOSIS — M79.2 NEURALGIA AND NEURITIS, UNSPECIFIED: ICD-10-CM

## 2025-08-29 DIAGNOSIS — I25.10 ATHEROSCLEROTIC HEART DISEASE OF NATIVE CORONARY ARTERY WITHOUT ANGINA PECTORIS: ICD-10-CM

## 2025-08-29 DIAGNOSIS — I48.91 UNSPECIFIED ATRIAL FIBRILLATION: ICD-10-CM

## 2025-09-04 ENCOUNTER — APPOINTMENT (OUTPATIENT)
Dept: ELECTROPHYSIOLOGY | Facility: CLINIC | Age: 75
End: 2025-09-04

## 2025-09-15 ENCOUNTER — APPOINTMENT (OUTPATIENT)
Dept: CARDIOLOGY | Facility: CLINIC | Age: 75
End: 2025-09-15